# Patient Record
Sex: MALE | Race: WHITE | Employment: UNEMPLOYED | URBAN - METROPOLITAN AREA
[De-identification: names, ages, dates, MRNs, and addresses within clinical notes are randomized per-mention and may not be internally consistent; named-entity substitution may affect disease eponyms.]

---

## 2019-09-25 ENCOUNTER — OFFICE VISIT (OUTPATIENT)
Dept: FAMILY MEDICINE CLINIC | Age: 39
End: 2019-09-25
Payer: COMMERCIAL

## 2019-09-25 VITALS
HEIGHT: 70 IN | SYSTOLIC BLOOD PRESSURE: 124 MMHG | DIASTOLIC BLOOD PRESSURE: 79 MMHG | BODY MASS INDEX: 22.9 KG/M2 | OXYGEN SATURATION: 98 % | WEIGHT: 160 LBS | HEART RATE: 69 BPM

## 2019-09-25 DIAGNOSIS — R10.12 LEFT UPPER QUADRANT PAIN: ICD-10-CM

## 2019-09-25 DIAGNOSIS — R10.9 ACUTE LEFT FLANK PAIN: Primary | ICD-10-CM

## 2019-09-25 DIAGNOSIS — K21.9 GASTROESOPHAGEAL REFLUX DISEASE WITHOUT ESOPHAGITIS: ICD-10-CM

## 2019-09-25 LAB
BILIRUBIN, POC: NORMAL
BLOOD URINE, POC: NORMAL
CLARITY, POC: NORMAL
COLOR, POC: YELLOW
GLUCOSE URINE, POC: NORMAL
KETONES, POC: NORMAL
LEUKOCYTE EST, POC: NORMAL
NITRITE, POC: NORMAL
PH, POC: 7.5
PROTEIN, POC: NORMAL
SPECIFIC GRAVITY, POC: 1.02
UROBILINOGEN, POC: 0.2

## 2019-09-25 PROCEDURE — G8420 CALC BMI NORM PARAMETERS: HCPCS | Performed by: FAMILY MEDICINE

## 2019-09-25 PROCEDURE — 81002 URINALYSIS NONAUTO W/O SCOPE: CPT | Performed by: FAMILY MEDICINE

## 2019-09-25 PROCEDURE — 99213 OFFICE O/P EST LOW 20 MIN: CPT | Performed by: FAMILY MEDICINE

## 2019-09-25 PROCEDURE — G8427 DOCREV CUR MEDS BY ELIG CLIN: HCPCS | Performed by: FAMILY MEDICINE

## 2019-09-25 PROCEDURE — 4004F PT TOBACCO SCREEN RCVD TLK: CPT | Performed by: FAMILY MEDICINE

## 2019-09-25 RX ORDER — PANTOPRAZOLE SODIUM 40 MG/1
40 TABLET, DELAYED RELEASE ORAL
Qty: 30 TABLET | Refills: 3 | Status: SHIPPED | OUTPATIENT
Start: 2019-09-25

## 2019-09-25 ASSESSMENT — PATIENT HEALTH QUESTIONNAIRE - PHQ9
SUM OF ALL RESPONSES TO PHQ QUESTIONS 1-9: 2
SUM OF ALL RESPONSES TO PHQ9 QUESTIONS 1 & 2: 2
SUM OF ALL RESPONSES TO PHQ QUESTIONS 1-9: 2
1. LITTLE INTEREST OR PLEASURE IN DOING THINGS: 1
2. FEELING DOWN, DEPRESSED OR HOPELESS: 1

## 2019-09-25 NOTE — PROGRESS NOTES
gets a big bulge sticks out in his left upper quadrant that is quite painful he is having some upset stomach in general and and is been quite miserable he denies any urinary or bowel complaints  Does have a history 4 years ago of pancreatitis. He is not a drinker  Review of Systems:     Constitutional: Negative for fever, appetite change and fatigue. Family social and medical history reviewed and unchanged     HENT: Negative. Negative for nosebleeds, trouble swallowing and neck pain. Eyes: Negative for photophobia and visual disturbance. Respiratory: Negative. Negative for chest tightness and shortness of breath. Cardiovascular: Negative. Negative for chest pain and leg swelling. Gastrointestinal: Negative. Negative for abdominal pain and blood in stool. Endocrine: Negative for cold intolerance and polyuria. Genitourinary: Negative for dysuria and hematuria. Musculoskeletal: Negative. Skin: Negative for rash. Allergic/Immunologic: Negative. Neurological: Negative. Negative for dizziness, weakness and numbness. Hematological: Negative. Negative for adenopathy. Does not bruise/bleed easily. Psychiatric/Behavioral: Negative for sleep disturbance, dysphoric mood and  decreased concentration. The patient is not nervous/anxious. Objective:     Physical Exam:     Nursing note and vitals reviewed. /79   Pulse 69   Ht 5' 10\" (1.778 m)   Wt 160 lb (72.6 kg)   SpO2 98%   BMI 22.96 kg/m²   Constitutional: He is oriented to person, place, and time. He   appears well-developed and well-nourished. HENT:   Head: Normocephalic and atraumatic. Right Ear: External ear normal. Tympanic membrane is not erythematous. No middle ear effusion. Left Ear: External ear normal. Tympanic membrane is not erythematous. No middle ear effusion. Nose: No mucosal edema. Mouth/Throat: Oropharynx is clear and moist. No posterior oropharyngeal erythema.     Eyes: Conjunctivae and EOM Standing Status:   Future     Standing Expiration Date:   3/25/2020    TSH without Reflex     Standing Status:   Future     Standing Expiration Date:   3/25/2020    Amylase     Standing Status:   Future     Standing Expiration Date:   9/25/2020    Lipase     Standing Status:   Future     Standing Expiration Date:   9/25/2020     Orders Placed This Encounter   Medications    pantoprazole (PROTONIX) 40 MG tablet     Sig: Take 1 tablet by mouth daily (with breakfast)     Dispense:  30 tablet     Refill:  3     To get the above testing done avoid taking any anti-inflammatories and will follow-up pending results  Electronically signed by Meloine Hamilton DO on 9/25/2019 at 2:11 PM

## 2019-09-26 ENCOUNTER — TELEPHONE (OUTPATIENT)
Dept: GASTROENTEROLOGY | Age: 39
End: 2019-09-26

## 2019-09-26 ENCOUNTER — TELEPHONE (OUTPATIENT)
Dept: FAMILY MEDICINE CLINIC | Age: 39
End: 2019-09-26

## 2019-09-26 ENCOUNTER — HOSPITAL ENCOUNTER (EMERGENCY)
Age: 39
Discharge: HOME | End: 2019-09-26
Attending: EMERGENCY MEDICINE
Payer: COMMERCIAL

## 2019-09-26 VITALS
RESPIRATION RATE: 16 BRPM | DIASTOLIC BLOOD PRESSURE: 76 MMHG | BODY MASS INDEX: 22.9 KG/M2 | HEART RATE: 75 BPM | HEIGHT: 70 IN | WEIGHT: 160 LBS | OXYGEN SATURATION: 100 % | TEMPERATURE: 97.7 F | SYSTOLIC BLOOD PRESSURE: 117 MMHG

## 2019-09-26 DIAGNOSIS — Z83.79 FAMILY HISTORY OF CROHN'S DISEASE: ICD-10-CM

## 2019-09-26 DIAGNOSIS — R10.12 LEFT UPPER QUADRANT PAIN: Primary | ICD-10-CM

## 2019-09-26 DIAGNOSIS — K50.119 CROHN'S DISEASE OF COLON WITH COMPLICATION (HCC): Primary | ICD-10-CM

## 2019-09-26 DIAGNOSIS — R10.9 ACUTE LEFT FLANK PAIN: ICD-10-CM

## 2019-09-26 DIAGNOSIS — R19.7 DIARRHEA, UNSPECIFIED TYPE: ICD-10-CM

## 2019-09-26 LAB
ABSOLUTE EOS #: 0.17 K/UL (ref 0–0.44)
ABSOLUTE IMMATURE GRANULOCYTE: <0.03 K/UL (ref 0–0.3)
ABSOLUTE LYMPH #: 1.84 K/UL (ref 1.1–3.7)
ABSOLUTE MONO #: 0.61 K/UL (ref 0.1–1.2)
ALBUMIN SERPL-MCNC: 4.1 G/DL (ref 3.5–5.2)
ALBUMIN/GLOBULIN RATIO: 1.2 (ref 1–2.5)
ALP BLD-CCNC: 53 U/L (ref 40–129)
ALT SERPL-CCNC: 19 U/L (ref 5–41)
ANION GAP SERPL CALCULATED.3IONS-SCNC: 9 MMOL/L (ref 9–17)
AST SERPL-CCNC: 43 U/L
BASOPHILS # BLD: 0 % (ref 0–2)
BASOPHILS ABSOLUTE: 0.04 K/UL (ref 0–0.2)
BILIRUB SERPL-MCNC: 0.68 MG/DL (ref 0.3–1.2)
BILIRUBIN URINE: NEGATIVE
BUN BLDV-MCNC: 10 MG/DL (ref 6–20)
BUN/CREAT BLD: ABNORMAL (ref 9–20)
CALCIUM SERPL-MCNC: 9.1 MG/DL (ref 8.6–10.4)
CHLORIDE BLD-SCNC: 103 MMOL/L (ref 98–107)
CO2: 24 MMOL/L (ref 20–31)
COLOR: YELLOW
COMMENT UA: ABNORMAL
CREAT SERPL-MCNC: 0.69 MG/DL (ref 0.7–1.2)
DIFFERENTIAL TYPE: ABNORMAL
EOSINOPHILS RELATIVE PERCENT: 2 % (ref 1–4)
GFR AFRICAN AMERICAN: >60 ML/MIN
GFR NON-AFRICAN AMERICAN: >60 ML/MIN
GFR SERPL CREATININE-BSD FRML MDRD: ABNORMAL ML/MIN/{1.73_M2}
GFR SERPL CREATININE-BSD FRML MDRD: ABNORMAL ML/MIN/{1.73_M2}
GLUCOSE BLD-MCNC: 105 MG/DL (ref 70–99)
GLUCOSE URINE: NEGATIVE
HCT VFR BLD CALC: 48.9 % (ref 40.7–50.3)
HEMOGLOBIN: 16.7 G/DL (ref 13–17)
IMMATURE GRANULOCYTES: 0 %
KETONES, URINE: ABNORMAL
LEUKOCYTE ESTERASE, URINE: NEGATIVE
LIPASE: 27 U/L (ref 13–60)
LYMPHOCYTES # BLD: 18 % (ref 24–43)
MCH RBC QN AUTO: 31.9 PG (ref 25.2–33.5)
MCHC RBC AUTO-ENTMCNC: 34.2 G/DL (ref 28.4–34.8)
MCV RBC AUTO: 93.5 FL (ref 82.6–102.9)
MONOCYTES # BLD: 6 % (ref 3–12)
NITRITE, URINE: NEGATIVE
NRBC AUTOMATED: 0 PER 100 WBC
PDW BLD-RTO: 13.2 % (ref 11.8–14.4)
PH UA: 8 (ref 5–8)
PLATELET # BLD: 269 K/UL (ref 138–453)
PLATELET ESTIMATE: ABNORMAL
PMV BLD AUTO: 10.2 FL (ref 8.1–13.5)
POTASSIUM SERPL-SCNC: 4.2 MMOL/L (ref 3.7–5.3)
POTASSIUM SERPL-SCNC: 5.5 MMOL/L (ref 3.7–5.3)
PROTEIN UA: NEGATIVE
RBC # BLD: 5.23 M/UL (ref 4.21–5.77)
RBC # BLD: ABNORMAL 10*6/UL
SEG NEUTROPHILS: 74 % (ref 36–65)
SEGMENTED NEUTROPHILS ABSOLUTE COUNT: 7.52 K/UL (ref 1.5–8.1)
SODIUM BLD-SCNC: 136 MMOL/L (ref 135–144)
SPECIFIC GRAVITY UA: 1.02 (ref 1–1.03)
TOTAL PROTEIN: 7.4 G/DL (ref 6.4–8.3)
TURBIDITY: CLEAR
URINE HGB: NEGATIVE
UROBILINOGEN, URINE: NORMAL
WBC # BLD: 10.2 K/UL (ref 3.5–11.3)
WBC # BLD: ABNORMAL 10*3/UL

## 2019-09-26 PROCEDURE — 84132 ASSAY OF SERUM POTASSIUM: CPT

## 2019-09-26 PROCEDURE — 96374 THER/PROPH/DIAG INJ IV PUSH: CPT

## 2019-09-26 PROCEDURE — 99284 EMERGENCY DEPT VISIT MOD MDM: CPT

## 2019-09-26 PROCEDURE — 81003 URINALYSIS AUTO W/O SCOPE: CPT

## 2019-09-26 PROCEDURE — 80053 COMPREHEN METABOLIC PANEL: CPT

## 2019-09-26 PROCEDURE — 96361 HYDRATE IV INFUSION ADD-ON: CPT

## 2019-09-26 PROCEDURE — 6360000002 HC RX W HCPCS: Performed by: STUDENT IN AN ORGANIZED HEALTH CARE EDUCATION/TRAINING PROGRAM

## 2019-09-26 PROCEDURE — 83690 ASSAY OF LIPASE: CPT

## 2019-09-26 PROCEDURE — 96375 TX/PRO/DX INJ NEW DRUG ADDON: CPT

## 2019-09-26 PROCEDURE — 2580000003 HC RX 258: Performed by: STUDENT IN AN ORGANIZED HEALTH CARE EDUCATION/TRAINING PROGRAM

## 2019-09-26 PROCEDURE — 6370000000 HC RX 637 (ALT 250 FOR IP): Performed by: STUDENT IN AN ORGANIZED HEALTH CARE EDUCATION/TRAINING PROGRAM

## 2019-09-26 PROCEDURE — 85025 COMPLETE CBC W/AUTO DIFF WBC: CPT

## 2019-09-26 RX ORDER — HEPARIN SODIUM (PORCINE) LOCK FLUSH IV SOLN 100 UNIT/ML 100 UNIT/ML
300 SOLUTION INTRAVENOUS ONCE
Status: DISCONTINUED | OUTPATIENT
Start: 2019-09-26 | End: 2019-09-26

## 2019-09-26 RX ORDER — 0.9 % SODIUM CHLORIDE 0.9 %
1000 INTRAVENOUS SOLUTION INTRAVENOUS ONCE
Status: COMPLETED | OUTPATIENT
Start: 2019-09-26 | End: 2019-09-26

## 2019-09-26 RX ORDER — ONDANSETRON 4 MG/1
4 TABLET, ORALLY DISINTEGRATING ORAL ONCE
Status: COMPLETED | OUTPATIENT
Start: 2019-09-26 | End: 2019-09-26

## 2019-09-26 RX ORDER — ONDANSETRON 2 MG/ML
4 INJECTION INTRAMUSCULAR; INTRAVENOUS ONCE
Status: COMPLETED | OUTPATIENT
Start: 2019-09-26 | End: 2019-09-26

## 2019-09-26 RX ORDER — OXYCODONE HYDROCHLORIDE AND ACETAMINOPHEN 5; 325 MG/1; MG/1
1 TABLET ORAL EVERY 6 HOURS PRN
Qty: 8 TABLET | Refills: 0 | Status: SHIPPED | OUTPATIENT
Start: 2019-09-26 | End: 2019-09-29

## 2019-09-26 RX ORDER — OXYCODONE HYDROCHLORIDE 5 MG/1
5 TABLET ORAL ONCE
Status: COMPLETED | OUTPATIENT
Start: 2019-09-26 | End: 2019-09-26

## 2019-09-26 RX ORDER — KETOROLAC TROMETHAMINE 30 MG/ML
30 INJECTION, SOLUTION INTRAMUSCULAR; INTRAVENOUS ONCE
Status: COMPLETED | OUTPATIENT
Start: 2019-09-26 | End: 2019-09-26

## 2019-09-26 RX ADMIN — SODIUM CHLORIDE 1000 ML: 9 INJECTION, SOLUTION INTRAVENOUS at 09:07

## 2019-09-26 RX ADMIN — ONDANSETRON 4 MG: 2 INJECTION INTRAMUSCULAR; INTRAVENOUS at 09:08

## 2019-09-26 RX ADMIN — OXYCODONE HYDROCHLORIDE 5 MG: 5 TABLET ORAL at 10:14

## 2019-09-26 RX ADMIN — ONDANSETRON 4 MG: 4 TABLET, ORALLY DISINTEGRATING ORAL at 10:14

## 2019-09-26 RX ADMIN — KETOROLAC TROMETHAMINE 30 MG: 30 INJECTION, SOLUTION INTRAMUSCULAR; INTRAVENOUS at 09:07

## 2019-09-26 ASSESSMENT — PAIN DESCRIPTION - ORIENTATION
ORIENTATION: LEFT;UPPER
ORIENTATION: LEFT

## 2019-09-26 ASSESSMENT — ENCOUNTER SYMPTOMS
BACK PAIN: 0
NAUSEA: 1
ABDOMINAL PAIN: 1
CONSTIPATION: 0
DIARRHEA: 1
WHEEZING: 0
VOMITING: 0
SHORTNESS OF BREATH: 0
COUGH: 0

## 2019-09-26 ASSESSMENT — PAIN SCALES - GENERAL
PAINLEVEL_OUTOF10: 4
PAINLEVEL_OUTOF10: 4
PAINLEVEL_OUTOF10: 7
PAINLEVEL_OUTOF10: 4
PAINLEVEL_OUTOF10: 7

## 2019-09-26 ASSESSMENT — PAIN DESCRIPTION - LOCATION
LOCATION: ABDOMEN
LOCATION: ABDOMEN

## 2019-09-26 ASSESSMENT — PAIN DESCRIPTION - FREQUENCY
FREQUENCY: INTERMITTENT
FREQUENCY: CONTINUOUS

## 2019-09-26 ASSESSMENT — PAIN DESCRIPTION - PROGRESSION
CLINICAL_PROGRESSION: GRADUALLY IMPROVING
CLINICAL_PROGRESSION: GRADUALLY WORSENING

## 2019-09-26 ASSESSMENT — PAIN DESCRIPTION - PAIN TYPE
TYPE: ACUTE PAIN
TYPE: ACUTE PAIN

## 2019-09-26 NOTE — ED NOTES
Pt. Resting on stretcher, eyes open, RR even and non-labored  Pt.  Updated on POC  Will continue to monitor       Derek Seth RN  09/26/19 [id number]

## 2019-09-26 NOTE — ED NOTES
Pt ambulatory to restroom with steady gait, provided with labeled urine cup for specimen collection     Sandhya Polanco RN  09/26/19 7179

## 2019-09-26 NOTE — ED PROVIDER NOTES
PROCEDURES:  None    CONSULTS:  IP CONSULT TO GI    CRITICAL CARE:  Please see attending note    FINAL IMPRESSION      1. Left upper quadrant pain          DISPOSITION / PLAN     DISPOSITION Decision To Discharge 09/26/2019 11:04:42 AM      PATIENT REFERRED TO:  [facility] ED  [address] [place] [address]  [phone]  Go to   If symptoms worsen    Abril Pabon DO  [address] [facility] [address] [facility] [address]  [address] [place] [address] [id number]  Call in 1 week      [address]  [phone]  Call in 3 days  Call PCP for referral, [phone] for GI office at [facility]:  Discharge Medication List as of 9/26/2019 11:35 AM      START taking these medications    Details   oxyCODONE-acetaminophen (PERCOCET) 5-325 MG per tablet Take 1 tablet by mouth every 6 hours as needed for Pain for up to 3 days. Intended supply: 3 days.  Take lowest dose possible to manage pain, Disp-8 tablet, R-0Print             Armando Kay DO  Emergency Medicine Resident    (Please note that portions of this note were completed with a voice recognition program.Efforts were made to edit the dictations but occasionally words are mis-transcribed.)        Armando Kay DO  Resident  09/26/19 8594

## 2019-10-02 ENCOUNTER — HOSPITAL ENCOUNTER (EMERGENCY)
Age: 39
Discharge: HOME | End: 2019-10-02
Attending: EMERGENCY MEDICINE
Payer: COMMERCIAL

## 2019-10-02 VITALS
OXYGEN SATURATION: 96 % | TEMPERATURE: 98.6 F | BODY MASS INDEX: 22.96 KG/M2 | WEIGHT: 160 LBS | HEART RATE: 75 BPM | RESPIRATION RATE: 15 BRPM | DIASTOLIC BLOOD PRESSURE: 78 MMHG | SYSTOLIC BLOOD PRESSURE: 120 MMHG

## 2019-10-02 DIAGNOSIS — R10.30 LOWER ABDOMINAL PAIN: Primary | ICD-10-CM

## 2019-10-02 LAB
ABSOLUTE EOS #: 0.1 K/UL (ref 0–0.44)
ABSOLUTE IMMATURE GRANULOCYTE: 0.04 K/UL (ref 0–0.3)
ABSOLUTE LYMPH #: 2.11 K/UL (ref 1.1–3.7)
ABSOLUTE MONO #: 0.51 K/UL (ref 0.1–1.2)
ALBUMIN SERPL-MCNC: 4.5 G/DL (ref 3.5–5.2)
ALBUMIN/GLOBULIN RATIO: 1.5 (ref 1–2.5)
ALP BLD-CCNC: 60 U/L (ref 40–129)
ALT SERPL-CCNC: 13 U/L (ref 5–41)
ANION GAP SERPL CALCULATED.3IONS-SCNC: 12 MMOL/L (ref 9–17)
AST SERPL-CCNC: 16 U/L
BASOPHILS # BLD: 0 % (ref 0–2)
BASOPHILS ABSOLUTE: 0.03 K/UL (ref 0–0.2)
BILIRUB SERPL-MCNC: 0.27 MG/DL (ref 0.3–1.2)
BILIRUBIN URINE: NEGATIVE
BUN BLDV-MCNC: 16 MG/DL (ref 6–20)
BUN/CREAT BLD: ABNORMAL (ref 9–20)
CALCIUM SERPL-MCNC: 9.4 MG/DL (ref 8.6–10.4)
CHLORIDE BLD-SCNC: 105 MMOL/L (ref 98–107)
CO2: 24 MMOL/L (ref 20–31)
COLOR: YELLOW
COMMENT UA: NORMAL
CREAT SERPL-MCNC: 0.69 MG/DL (ref 0.7–1.2)
DIFFERENTIAL TYPE: ABNORMAL
EOSINOPHILS RELATIVE PERCENT: 1 % (ref 1–4)
GFR AFRICAN AMERICAN: >60 ML/MIN
GFR NON-AFRICAN AMERICAN: >60 ML/MIN
GFR SERPL CREATININE-BSD FRML MDRD: ABNORMAL ML/MIN/{1.73_M2}
GFR SERPL CREATININE-BSD FRML MDRD: ABNORMAL ML/MIN/{1.73_M2}
GLUCOSE BLD-MCNC: 106 MG/DL (ref 70–99)
GLUCOSE URINE: NEGATIVE
HCT VFR BLD CALC: 49.7 % (ref 40.7–50.3)
HEMOGLOBIN: 16.8 G/DL (ref 13–17)
IMMATURE GRANULOCYTES: 1 %
KETONES, URINE: NEGATIVE
LEUKOCYTE ESTERASE, URINE: NEGATIVE
LIPASE: 43 U/L (ref 13–60)
LYMPHOCYTES # BLD: 30 % (ref 24–43)
MCH RBC QN AUTO: 31.5 PG (ref 25.2–33.5)
MCHC RBC AUTO-ENTMCNC: 33.8 G/DL (ref 28.4–34.8)
MCV RBC AUTO: 93.2 FL (ref 82.6–102.9)
MONOCYTES # BLD: 7 % (ref 3–12)
NITRITE, URINE: NEGATIVE
NRBC AUTOMATED: 0 PER 100 WBC
PDW BLD-RTO: 13 % (ref 11.8–14.4)
PH UA: 6 (ref 5–8)
PLATELET # BLD: 264 K/UL (ref 138–453)
PLATELET ESTIMATE: ABNORMAL
PMV BLD AUTO: 9.9 FL (ref 8.1–13.5)
POTASSIUM SERPL-SCNC: 4.5 MMOL/L (ref 3.7–5.3)
PROTEIN UA: NEGATIVE
RBC # BLD: 5.33 M/UL (ref 4.21–5.77)
RBC # BLD: ABNORMAL 10*6/UL
SEG NEUTROPHILS: 61 % (ref 36–65)
SEGMENTED NEUTROPHILS ABSOLUTE COUNT: 4.28 K/UL (ref 1.5–8.1)
SODIUM BLD-SCNC: 141 MMOL/L (ref 135–144)
SPECIFIC GRAVITY UA: 1.03 (ref 1–1.03)
TOTAL PROTEIN: 7.5 G/DL (ref 6.4–8.3)
TURBIDITY: CLEAR
URINE HGB: NEGATIVE
UROBILINOGEN, URINE: NORMAL
WBC # BLD: 7.1 K/UL (ref 3.5–11.3)
WBC # BLD: ABNORMAL 10*3/UL

## 2019-10-02 PROCEDURE — 80053 COMPREHEN METABOLIC PANEL: CPT

## 2019-10-02 PROCEDURE — 81003 URINALYSIS AUTO W/O SCOPE: CPT

## 2019-10-02 PROCEDURE — 85025 COMPLETE CBC W/AUTO DIFF WBC: CPT

## 2019-10-02 PROCEDURE — 83690 ASSAY OF LIPASE: CPT

## 2019-10-02 PROCEDURE — 6360000002 HC RX W HCPCS: Performed by: STUDENT IN AN ORGANIZED HEALTH CARE EDUCATION/TRAINING PROGRAM

## 2019-10-02 PROCEDURE — 99284 EMERGENCY DEPT VISIT MOD MDM: CPT

## 2019-10-02 PROCEDURE — 96374 THER/PROPH/DIAG INJ IV PUSH: CPT

## 2019-10-02 RX ORDER — MORPHINE SULFATE 4 MG/ML
4 INJECTION, SOLUTION INTRAMUSCULAR; INTRAVENOUS ONCE
Status: COMPLETED | OUTPATIENT
Start: 2019-10-02 | End: 2019-10-02

## 2019-10-02 RX ORDER — ONDANSETRON 2 MG/ML
4 INJECTION INTRAMUSCULAR; INTRAVENOUS ONCE
Status: COMPLETED | OUTPATIENT
Start: 2019-10-02 | End: 2019-10-02

## 2019-10-02 RX ADMIN — MORPHINE SULFATE 4 MG: 4 INJECTION INTRAVENOUS at 09:31

## 2019-10-02 RX ADMIN — ONDANSETRON 4 MG: 2 INJECTION INTRAMUSCULAR; INTRAVENOUS at 09:31

## 2019-10-02 ASSESSMENT — ENCOUNTER SYMPTOMS
ABDOMINAL PAIN: 1
NAUSEA: 0
CONSTIPATION: 0
SHORTNESS OF BREATH: 0
COUGH: 0
VOMITING: 0
BACK PAIN: 0
DIARRHEA: 0

## 2019-10-02 ASSESSMENT — PAIN DESCRIPTION - ORIENTATION: ORIENTATION: LEFT;UPPER

## 2019-10-02 ASSESSMENT — PAIN DESCRIPTION - LOCATION: LOCATION: ABDOMEN

## 2019-10-02 ASSESSMENT — PAIN SCALES - GENERAL
PAINLEVEL_OUTOF10: 7
PAINLEVEL_OUTOF10: 7

## 2019-10-13 ENCOUNTER — HOSPITAL ENCOUNTER (EMERGENCY)
Age: 39
Discharge: HOME | End: 2019-10-13
Attending: EMERGENCY MEDICINE
Payer: COMMERCIAL

## 2019-10-13 ENCOUNTER — APPOINTMENT (OUTPATIENT)
Dept: CT IMAGING | Age: 39
End: 2019-10-13
Payer: COMMERCIAL

## 2019-10-13 ENCOUNTER — APPOINTMENT (OUTPATIENT)
Dept: GENERAL RADIOLOGY | Age: 39
End: 2019-10-13
Payer: COMMERCIAL

## 2019-10-13 VITALS
TEMPERATURE: 96.6 F | OXYGEN SATURATION: 100 % | DIASTOLIC BLOOD PRESSURE: 76 MMHG | SYSTOLIC BLOOD PRESSURE: 111 MMHG | RESPIRATION RATE: 16 BRPM | HEART RATE: 78 BPM

## 2019-10-13 DIAGNOSIS — R10.84 GENERALIZED ABDOMINAL PAIN: ICD-10-CM

## 2019-10-13 DIAGNOSIS — R11.2 NON-INTRACTABLE VOMITING WITH NAUSEA, UNSPECIFIED VOMITING TYPE: Primary | ICD-10-CM

## 2019-10-13 DIAGNOSIS — R19.8 ALTERNATING CONSTIPATION AND DIARRHEA: ICD-10-CM

## 2019-10-13 LAB
ABSOLUTE EOS #: 0.07 K/UL (ref 0–0.44)
ABSOLUTE IMMATURE GRANULOCYTE: <0.03 K/UL (ref 0–0.3)
ABSOLUTE LYMPH #: 1.82 K/UL (ref 1.1–3.7)
ABSOLUTE MONO #: 0.46 K/UL (ref 0.1–1.2)
ALBUMIN SERPL-MCNC: 4.6 G/DL (ref 3.5–5.2)
ALBUMIN/GLOBULIN RATIO: 1.4 (ref 1–2.5)
ALP BLD-CCNC: 59 U/L (ref 40–129)
ALT SERPL-CCNC: 11 U/L (ref 5–41)
ANION GAP SERPL CALCULATED.3IONS-SCNC: 11 MMOL/L (ref 9–17)
AST SERPL-CCNC: 15 U/L
BASOPHILS # BLD: 0 % (ref 0–2)
BASOPHILS ABSOLUTE: 0.03 K/UL (ref 0–0.2)
BILIRUB SERPL-MCNC: 0.7 MG/DL (ref 0.3–1.2)
BUN BLDV-MCNC: 17 MG/DL (ref 6–20)
BUN/CREAT BLD: ABNORMAL (ref 9–20)
C-REACTIVE PROTEIN: 0.5 MG/L (ref 0–5)
CALCIUM SERPL-MCNC: 9.6 MG/DL (ref 8.6–10.4)
CHLORIDE BLD-SCNC: 103 MMOL/L (ref 98–107)
CO2: 26 MMOL/L (ref 20–31)
CREAT SERPL-MCNC: 0.75 MG/DL (ref 0.7–1.2)
DIFFERENTIAL TYPE: ABNORMAL
EOSINOPHILS RELATIVE PERCENT: 1 % (ref 1–4)
GFR AFRICAN AMERICAN: >60 ML/MIN
GFR NON-AFRICAN AMERICAN: >60 ML/MIN
GFR SERPL CREATININE-BSD FRML MDRD: ABNORMAL ML/MIN/{1.73_M2}
GFR SERPL CREATININE-BSD FRML MDRD: ABNORMAL ML/MIN/{1.73_M2}
GLUCOSE BLD-MCNC: 114 MG/DL (ref 70–99)
HCT VFR BLD CALC: 51.2 % (ref 40.7–50.3)
HEMOGLOBIN: 17.5 G/DL (ref 13–17)
IMMATURE GRANULOCYTES: 0 %
LIPASE: 38 U/L (ref 13–60)
LYMPHOCYTES # BLD: 22 % (ref 24–43)
MCH RBC QN AUTO: 31.8 PG (ref 25.2–33.5)
MCHC RBC AUTO-ENTMCNC: 34.2 G/DL (ref 28.4–34.8)
MCV RBC AUTO: 93.1 FL (ref 82.6–102.9)
MONOCYTES # BLD: 6 % (ref 3–12)
NRBC AUTOMATED: 0 PER 100 WBC
PDW BLD-RTO: 12.6 % (ref 11.8–14.4)
PLATELET # BLD: 258 K/UL (ref 138–453)
PLATELET ESTIMATE: ABNORMAL
PMV BLD AUTO: 10.1 FL (ref 8.1–13.5)
POTASSIUM SERPL-SCNC: 4.3 MMOL/L (ref 3.7–5.3)
RBC # BLD: 5.5 M/UL (ref 4.21–5.77)
RBC # BLD: ABNORMAL 10*6/UL
SEDIMENTATION RATE, ERYTHROCYTE: 1 MM (ref 0–10)
SEG NEUTROPHILS: 71 % (ref 36–65)
SEGMENTED NEUTROPHILS ABSOLUTE COUNT: 5.76 K/UL (ref 1.5–8.1)
SODIUM BLD-SCNC: 140 MMOL/L (ref 135–144)
TOTAL PROTEIN: 7.8 G/DL (ref 6.4–8.3)
TROPONIN INTERP: NORMAL
TROPONIN T: NORMAL NG/ML
TROPONIN, HIGH SENSITIVITY: <6 NG/L (ref 0–22)
WBC # BLD: 8.2 K/UL (ref 3.5–11.3)
WBC # BLD: ABNORMAL 10*3/UL

## 2019-10-13 PROCEDURE — 96375 TX/PRO/DX INJ NEW DRUG ADDON: CPT

## 2019-10-13 PROCEDURE — 96376 TX/PRO/DX INJ SAME DRUG ADON: CPT

## 2019-10-13 PROCEDURE — 99284 EMERGENCY DEPT VISIT MOD MDM: CPT

## 2019-10-13 PROCEDURE — 2500000003 HC RX 250 WO HCPCS: Performed by: EMERGENCY MEDICINE

## 2019-10-13 PROCEDURE — 83690 ASSAY OF LIPASE: CPT

## 2019-10-13 PROCEDURE — 85025 COMPLETE CBC W/AUTO DIFF WBC: CPT

## 2019-10-13 PROCEDURE — 6360000002 HC RX W HCPCS: Performed by: EMERGENCY MEDICINE

## 2019-10-13 PROCEDURE — 84484 ASSAY OF TROPONIN QUANT: CPT

## 2019-10-13 PROCEDURE — 86140 C-REACTIVE PROTEIN: CPT

## 2019-10-13 PROCEDURE — 93005 ELECTROCARDIOGRAM TRACING: CPT | Performed by: EMERGENCY MEDICINE

## 2019-10-13 PROCEDURE — 71046 X-RAY EXAM CHEST 2 VIEWS: CPT

## 2019-10-13 PROCEDURE — 74177 CT ABD & PELVIS W/CONTRAST: CPT

## 2019-10-13 PROCEDURE — 80053 COMPREHEN METABOLIC PANEL: CPT

## 2019-10-13 PROCEDURE — 2580000003 HC RX 258: Performed by: EMERGENCY MEDICINE

## 2019-10-13 PROCEDURE — 96374 THER/PROPH/DIAG INJ IV PUSH: CPT

## 2019-10-13 PROCEDURE — 85651 RBC SED RATE NONAUTOMATED: CPT

## 2019-10-13 PROCEDURE — 6360000004 HC RX CONTRAST MEDICATION: Performed by: EMERGENCY MEDICINE

## 2019-10-13 RX ORDER — ONDANSETRON 4 MG/1
4 TABLET, FILM COATED ORAL EVERY 8 HOURS PRN
Qty: 20 TABLET | Refills: 0 | Status: SHIPPED | OUTPATIENT
Start: 2019-10-13

## 2019-10-13 RX ORDER — DICYCLOMINE HYDROCHLORIDE 10 MG/1
10 CAPSULE ORAL EVERY 6 HOURS PRN
Qty: 20 CAPSULE | Refills: 0 | Status: SHIPPED | OUTPATIENT
Start: 2019-10-13

## 2019-10-13 RX ORDER — MORPHINE SULFATE 4 MG/ML
4 INJECTION, SOLUTION INTRAMUSCULAR; INTRAVENOUS ONCE
Status: COMPLETED | OUTPATIENT
Start: 2019-10-13 | End: 2019-10-13

## 2019-10-13 RX ORDER — ACETAMINOPHEN 325 MG/1
650 TABLET ORAL EVERY 6 HOURS PRN
Qty: 21 TABLET | Refills: 0 | Status: SHIPPED | OUTPATIENT
Start: 2019-10-13

## 2019-10-13 RX ORDER — 0.9 % SODIUM CHLORIDE 0.9 %
1000 INTRAVENOUS SOLUTION INTRAVENOUS ONCE
Status: COMPLETED | OUTPATIENT
Start: 2019-10-13 | End: 2019-10-13

## 2019-10-13 RX ORDER — PROMETHAZINE HYDROCHLORIDE 25 MG/ML
12.5 INJECTION, SOLUTION INTRAMUSCULAR; INTRAVENOUS ONCE
Status: COMPLETED | OUTPATIENT
Start: 2019-10-13 | End: 2019-10-13

## 2019-10-13 RX ORDER — ONDANSETRON 2 MG/ML
4 INJECTION INTRAMUSCULAR; INTRAVENOUS ONCE
Status: COMPLETED | OUTPATIENT
Start: 2019-10-13 | End: 2019-10-13

## 2019-10-13 RX ORDER — PROMETHAZINE HYDROCHLORIDE 25 MG/1
25 TABLET ORAL EVERY 6 HOURS PRN
Qty: 20 TABLET | Refills: 0 | Status: SHIPPED | OUTPATIENT
Start: 2019-10-13 | End: 2019-10-20

## 2019-10-13 RX ADMIN — SODIUM CHLORIDE 1000 ML: 9 INJECTION, SOLUTION INTRAVENOUS at 10:31

## 2019-10-13 RX ADMIN — MORPHINE SULFATE 4 MG: 4 INJECTION INTRAVENOUS at 10:31

## 2019-10-13 RX ADMIN — IOHEXOL 75 ML: 350 INJECTION, SOLUTION INTRAVENOUS at 12:37

## 2019-10-13 RX ADMIN — PROMETHAZINE HYDROCHLORIDE 12.5 MG: 25 INJECTION INTRAMUSCULAR; INTRAVENOUS at 12:43

## 2019-10-13 RX ADMIN — ONDANSETRON 4 MG: 2 INJECTION INTRAMUSCULAR; INTRAVENOUS at 10:31

## 2019-10-13 RX ADMIN — MORPHINE SULFATE 4 MG: 4 INJECTION INTRAVENOUS at 12:43

## 2019-10-13 RX ADMIN — FAMOTIDINE 20 MG: 10 INJECTION, SOLUTION INTRAVENOUS at 10:31

## 2019-10-13 SDOH — HEALTH STABILITY: MENTAL HEALTH: HOW OFTEN DO YOU HAVE A DRINK CONTAINING ALCOHOL?: NEVER

## 2019-10-13 ASSESSMENT — ENCOUNTER SYMPTOMS
SHORTNESS OF BREATH: 1
BACK PAIN: 0
ABDOMINAL PAIN: 1
VOMITING: 1
CONSTIPATION: 1
NAUSEA: 1
DIARRHEA: 1

## 2019-10-13 ASSESSMENT — PAIN DESCRIPTION - LOCATION: LOCATION: ABDOMEN

## 2019-10-13 ASSESSMENT — PAIN SCALES - GENERAL
PAINLEVEL_OUTOF10: 8

## 2019-10-13 ASSESSMENT — PAIN DESCRIPTION - PAIN TYPE: TYPE: ACUTE PAIN

## 2019-10-14 ENCOUNTER — HOSPITAL ENCOUNTER (EMERGENCY)
Age: 39
Discharge: HOME | End: 2019-10-14
Attending: EMERGENCY MEDICINE
Payer: COMMERCIAL

## 2019-10-14 VITALS
WEIGHT: 160 LBS | DIASTOLIC BLOOD PRESSURE: 76 MMHG | TEMPERATURE: 98.3 F | HEIGHT: 71 IN | BODY MASS INDEX: 22.4 KG/M2 | OXYGEN SATURATION: 99 % | SYSTOLIC BLOOD PRESSURE: 114 MMHG | RESPIRATION RATE: 16 BRPM | HEART RATE: 67 BPM

## 2019-10-14 DIAGNOSIS — R10.84 GENERALIZED ABDOMINAL PAIN: Primary | ICD-10-CM

## 2019-10-14 LAB
-: ABNORMAL
ABSOLUTE EOS #: 0.1 K/UL (ref 0–0.4)
ABSOLUTE IMMATURE GRANULOCYTE: NORMAL K/UL (ref 0–0.3)
ABSOLUTE LYMPH #: 2.4 K/UL (ref 1–4.8)
ABSOLUTE MONO #: 0.7 K/UL (ref 0.1–1.3)
ALBUMIN SERPL-MCNC: 4.7 G/DL (ref 3.5–5.2)
ALBUMIN/GLOBULIN RATIO: ABNORMAL (ref 1–2.5)
ALP BLD-CCNC: 57 U/L (ref 40–129)
ALT SERPL-CCNC: 10 U/L (ref 5–41)
AMORPHOUS: ABNORMAL
ANION GAP SERPL CALCULATED.3IONS-SCNC: 15 MMOL/L (ref 9–17)
AST SERPL-CCNC: 13 U/L
BACTERIA: ABNORMAL
BASOPHILS # BLD: 1 % (ref 0–2)
BASOPHILS ABSOLUTE: 0.1 K/UL (ref 0–0.2)
BILIRUB SERPL-MCNC: 0.5 MG/DL (ref 0.3–1.2)
BILIRUBIN URINE: NEGATIVE
BUN BLDV-MCNC: 14 MG/DL (ref 6–20)
BUN/CREAT BLD: ABNORMAL (ref 9–20)
CALCIUM SERPL-MCNC: 9.9 MG/DL (ref 8.6–10.4)
CASTS UA: ABNORMAL /LPF
CHLORIDE BLD-SCNC: 99 MMOL/L (ref 98–107)
CO2: 28 MMOL/L (ref 20–31)
COLOR: YELLOW
COMMENT UA: ABNORMAL
CREAT SERPL-MCNC: 0.8 MG/DL (ref 0.7–1.2)
CRYSTALS, UA: ABNORMAL /HPF
DIFFERENTIAL TYPE: NORMAL
EKG ATRIAL RATE: 79 BPM
EKG P AXIS: 71 DEGREES
EKG P-R INTERVAL: 150 MS
EKG Q-T INTERVAL: 370 MS
EKG QRS DURATION: 86 MS
EKG QTC CALCULATION (BAZETT): 424 MS
EKG R AXIS: 79 DEGREES
EKG T AXIS: 39 DEGREES
EKG VENTRICULAR RATE: 79 BPM
EOSINOPHILS RELATIVE PERCENT: 1 % (ref 0–4)
EPITHELIAL CELLS UA: ABNORMAL /HPF
GFR AFRICAN AMERICAN: >60 ML/MIN
GFR NON-AFRICAN AMERICAN: >60 ML/MIN
GFR SERPL CREATININE-BSD FRML MDRD: ABNORMAL ML/MIN/{1.73_M2}
GFR SERPL CREATININE-BSD FRML MDRD: ABNORMAL ML/MIN/{1.73_M2}
GLUCOSE BLD-MCNC: 60 MG/DL (ref 70–99)
GLUCOSE URINE: NEGATIVE
HCT VFR BLD CALC: 49.9 % (ref 41–53)
HEMOGLOBIN: 17 G/DL (ref 13.5–17.5)
IMMATURE GRANULOCYTES: NORMAL %
KETONES, URINE: NEGATIVE
LEUKOCYTE ESTERASE, URINE: NEGATIVE
LIPASE: 55 U/L (ref 13–60)
LYMPHOCYTES # BLD: 27 % (ref 24–44)
MCH RBC QN AUTO: 31.7 PG (ref 26–34)
MCHC RBC AUTO-ENTMCNC: 34.1 G/DL (ref 31–37)
MCV RBC AUTO: 92.8 FL (ref 80–100)
MONOCYTES # BLD: 7 % (ref 1–7)
MUCUS: ABNORMAL
NITRITE, URINE: NEGATIVE
NRBC AUTOMATED: NORMAL PER 100 WBC
OTHER OBSERVATIONS UA: ABNORMAL
PDW BLD-RTO: 14 % (ref 11.5–14.9)
PH UA: 7.5 (ref 5–8)
PLATELET # BLD: 236 K/UL (ref 150–450)
PLATELET ESTIMATE: NORMAL
PMV BLD AUTO: 8.2 FL (ref 6–12)
POTASSIUM SERPL-SCNC: 4.1 MMOL/L (ref 3.7–5.3)
PROTEIN UA: NEGATIVE
RBC # BLD: 5.37 M/UL (ref 4.5–5.9)
RBC # BLD: NORMAL 10*6/UL
RBC UA: ABNORMAL /HPF
RENAL EPITHELIAL, UA: ABNORMAL /HPF
SEG NEUTROPHILS: 64 % (ref 36–66)
SEGMENTED NEUTROPHILS ABSOLUTE COUNT: 5.7 K/UL (ref 1.3–9.1)
SODIUM BLD-SCNC: 142 MMOL/L (ref 135–144)
SPECIFIC GRAVITY UA: 1.02 (ref 1–1.03)
TOTAL PROTEIN: 7.6 G/DL (ref 6.4–8.3)
TRICHOMONAS: ABNORMAL
TURBIDITY: ABNORMAL
URINE HGB: NEGATIVE
UROBILINOGEN, URINE: NORMAL
WBC # BLD: 9 K/UL (ref 3.5–11)
WBC # BLD: NORMAL 10*3/UL
WBC UA: ABNORMAL /HPF
YEAST: ABNORMAL

## 2019-10-14 PROCEDURE — 83690 ASSAY OF LIPASE: CPT

## 2019-10-14 PROCEDURE — 36415 COLL VENOUS BLD VENIPUNCTURE: CPT

## 2019-10-14 PROCEDURE — 85025 COMPLETE CBC W/AUTO DIFF WBC: CPT

## 2019-10-14 PROCEDURE — 81001 URINALYSIS AUTO W/SCOPE: CPT

## 2019-10-14 PROCEDURE — 6360000002 HC RX W HCPCS: Performed by: EMERGENCY MEDICINE

## 2019-10-14 PROCEDURE — 96374 THER/PROPH/DIAG INJ IV PUSH: CPT

## 2019-10-14 PROCEDURE — 96372 THER/PROPH/DIAG INJ SC/IM: CPT

## 2019-10-14 PROCEDURE — 2580000003 HC RX 258: Performed by: EMERGENCY MEDICINE

## 2019-10-14 PROCEDURE — 93010 ELECTROCARDIOGRAM REPORT: CPT | Performed by: INTERNAL MEDICINE

## 2019-10-14 PROCEDURE — 80053 COMPREHEN METABOLIC PANEL: CPT

## 2019-10-14 PROCEDURE — 99284 EMERGENCY DEPT VISIT MOD MDM: CPT

## 2019-10-14 RX ORDER — DICYCLOMINE HYDROCHLORIDE 10 MG/ML
20 INJECTION INTRAMUSCULAR ONCE
Status: COMPLETED | OUTPATIENT
Start: 2019-10-14 | End: 2019-10-14

## 2019-10-14 RX ORDER — 0.9 % SODIUM CHLORIDE 0.9 %
1000 INTRAVENOUS SOLUTION INTRAVENOUS ONCE
Status: COMPLETED | OUTPATIENT
Start: 2019-10-14 | End: 2019-10-14

## 2019-10-14 RX ORDER — KETOROLAC TROMETHAMINE 30 MG/ML
30 INJECTION, SOLUTION INTRAMUSCULAR; INTRAVENOUS ONCE
Status: COMPLETED | OUTPATIENT
Start: 2019-10-14 | End: 2019-10-14

## 2019-10-14 RX ADMIN — KETOROLAC TROMETHAMINE 30 MG: 30 INJECTION, SOLUTION INTRAMUSCULAR; INTRAVENOUS at 12:03

## 2019-10-14 RX ADMIN — SODIUM CHLORIDE 1000 ML: 9 INJECTION, SOLUTION INTRAVENOUS at 12:03

## 2019-10-14 RX ADMIN — DICYCLOMINE HYDROCHLORIDE 20 MG: 20 INJECTION, SOLUTION INTRAMUSCULAR at 11:53

## 2019-10-14 ASSESSMENT — PAIN DESCRIPTION - PAIN TYPE
TYPE: ACUTE PAIN
TYPE: ACUTE PAIN

## 2019-10-14 ASSESSMENT — PAIN DESCRIPTION - FREQUENCY
FREQUENCY: CONTINUOUS
FREQUENCY: INTERMITTENT

## 2019-10-14 ASSESSMENT — PAIN DESCRIPTION - LOCATION
LOCATION: ABDOMEN
LOCATION: ABDOMEN

## 2019-10-14 ASSESSMENT — PAIN DESCRIPTION - DESCRIPTORS
DESCRIPTORS: ACHING;CRAMPING
DESCRIPTORS: PRESSURE

## 2019-10-14 ASSESSMENT — ENCOUNTER SYMPTOMS
COUGH: 0
SHORTNESS OF BREATH: 0
DIARRHEA: 0
NAUSEA: 0
BACK PAIN: 0
VOMITING: 0
EYES NEGATIVE: 1
ABDOMINAL PAIN: 1
RESPIRATORY NEGATIVE: 1

## 2019-10-14 ASSESSMENT — PAIN DESCRIPTION - PROGRESSION: CLINICAL_PROGRESSION: GRADUALLY WORSENING

## 2019-10-14 ASSESSMENT — PAIN SCALES - GENERAL
PAINLEVEL_OUTOF10: 6

## 2019-10-14 ASSESSMENT — PAIN DESCRIPTION - ONSET: ONSET: ON-GOING

## 2019-10-14 ASSESSMENT — PAIN DESCRIPTION - ORIENTATION
ORIENTATION: LEFT
ORIENTATION: LEFT;LOWER

## 2019-10-15 ENCOUNTER — OFFICE VISIT (OUTPATIENT)
Dept: GASTROENTEROLOGY | Age: 39
End: 2019-10-15
Payer: COMMERCIAL

## 2019-10-15 VITALS
SYSTOLIC BLOOD PRESSURE: 105 MMHG | DIASTOLIC BLOOD PRESSURE: 78 MMHG | HEART RATE: 70 BPM | WEIGHT: 157.2 LBS | BODY MASS INDEX: 21.92 KG/M2

## 2019-10-15 DIAGNOSIS — F41.9 ANXIETY: ICD-10-CM

## 2019-10-15 DIAGNOSIS — R10.9 LEFT FLANK PAIN: ICD-10-CM

## 2019-10-15 DIAGNOSIS — Z83.79 FAMILY HISTORY OF CROHN'S DISEASE: ICD-10-CM

## 2019-10-15 DIAGNOSIS — R19.7 DIARRHEA, UNSPECIFIED TYPE: Primary | ICD-10-CM

## 2019-10-15 PROCEDURE — G8427 DOCREV CUR MEDS BY ELIG CLIN: HCPCS | Performed by: INTERNAL MEDICINE

## 2019-10-15 PROCEDURE — 99245 OFF/OP CONSLTJ NEW/EST HI 55: CPT | Performed by: INTERNAL MEDICINE

## 2019-10-15 PROCEDURE — G8484 FLU IMMUNIZE NO ADMIN: HCPCS | Performed by: INTERNAL MEDICINE

## 2019-10-15 PROCEDURE — G8420 CALC BMI NORM PARAMETERS: HCPCS | Performed by: INTERNAL MEDICINE

## 2019-10-15 RX ORDER — ALPRAZOLAM 0.25 MG/1
0.25 TABLET ORAL EVERY 12 HOURS PRN
Qty: 30 TABLET | Refills: 0 | Status: SHIPPED | OUTPATIENT
Start: 2019-10-15 | End: 2019-10-30

## 2019-10-15 RX ORDER — POLYETHYLENE GLYCOL 3350 17 G/17G
POWDER, FOR SOLUTION ORAL
Qty: 238 G | Refills: 0 | Status: ON HOLD | OUTPATIENT
Start: 2019-10-15 | End: 2019-10-25 | Stop reason: ALTCHOICE

## 2019-10-15 ASSESSMENT — ENCOUNTER SYMPTOMS
CONSTIPATION: 1
VOICE CHANGE: 0
ANAL BLEEDING: 0
ABDOMINAL PAIN: 1
NAUSEA: 1
COUGH: 0
BACK PAIN: 1
ABDOMINAL DISTENTION: 0
SINUS PRESSURE: 0
VOMITING: 0
DIARRHEA: 1
TROUBLE SWALLOWING: 0
SHORTNESS OF BREATH: 1
BLOOD IN STOOL: 0
EYES NEGATIVE: 1
CHOKING: 0
WHEEZING: 0
RECTAL PAIN: 0
SORE THROAT: 0

## 2019-10-16 ENCOUNTER — HOSPITAL ENCOUNTER (OUTPATIENT)
Dept: GENERAL RADIOLOGY | Age: 39
Discharge: HOME | End: 2019-10-18
Payer: COMMERCIAL

## 2019-10-16 ENCOUNTER — HOSPITAL ENCOUNTER (OUTPATIENT)
Age: 39
Discharge: HOME | End: 2019-10-16
Payer: COMMERCIAL

## 2019-10-16 DIAGNOSIS — R19.7 DIARRHEA, UNSPECIFIED TYPE: ICD-10-CM

## 2019-10-16 LAB
SEDIMENTATION RATE, ERYTHROCYTE: 2 MM (ref 0–10)
TSH SERPL DL<=0.05 MIU/L-ACNC: 6.32 MIU/L (ref 0.3–5)

## 2019-10-16 PROCEDURE — 83516 IMMUNOASSAY NONANTIBODY: CPT

## 2019-10-16 PROCEDURE — 85651 RBC SED RATE NONAUTOMATED: CPT

## 2019-10-16 PROCEDURE — 74249 FL UGI W SMALL BOWEL W DOUBLE CONTRAST: CPT

## 2019-10-16 PROCEDURE — 2500000003 HC RX 250 WO HCPCS: Performed by: INTERNAL MEDICINE

## 2019-10-16 PROCEDURE — 84439 ASSAY OF FREE THYROXINE: CPT

## 2019-10-16 PROCEDURE — 36415 COLL VENOUS BLD VENIPUNCTURE: CPT

## 2019-10-16 PROCEDURE — 84443 ASSAY THYROID STIM HORMONE: CPT

## 2019-10-16 RX ADMIN — BARIUM SULFATE 160 ML: 960 POWDER, FOR SUSPENSION ORAL at 08:55

## 2019-10-16 RX ADMIN — BARIUM SULFATE 140 ML: 980 POWDER, FOR SUSPENSION ORAL at 08:45

## 2019-10-17 LAB
THYROXINE, FREE: 1 NG/DL (ref 0.93–1.7)
TISSUE TRANSGLUTAMINASE IGA: 0.5 U/ML

## 2019-10-19 ENCOUNTER — HOSPITAL ENCOUNTER (OUTPATIENT)
Age: 39
Discharge: HOME | End: 2019-10-19
Payer: COMMERCIAL

## 2019-10-19 LAB
DATE, STOOL #1: NORMAL
DATE, STOOL #2: NORMAL
DATE, STOOL #3: NORMAL
HEMOCCULT SP1 STL QL: NEGATIVE
HEMOCCULT SP2 STL QL: NEGATIVE
HEMOCCULT SP3 STL QL: NEGATIVE
TIME, STOOL #1: NORMAL
TIME, STOOL #2: NORMAL
TIME, STOOL #3: NORMAL

## 2019-10-19 PROCEDURE — G0328 FECAL BLOOD SCRN IMMUNOASSAY: HCPCS

## 2019-10-19 PROCEDURE — 83520 IMMUNOASSAY QUANT NOS NONAB: CPT

## 2019-10-19 PROCEDURE — 82705 FATS/LIPIDS FECES QUAL: CPT

## 2019-10-19 PROCEDURE — 87329 GIARDIA AG IA: CPT

## 2019-10-21 LAB
DIRECT EXAM: NORMAL
FAT QUALITATIVE SPLIT STOOL: NORMAL
FECAL NEUTRAL FAT: NORMAL
Lab: NORMAL
SPECIMEN DESCRIPTION: NORMAL

## 2019-10-22 ENCOUNTER — TELEPHONE (OUTPATIENT)
Dept: GASTROENTEROLOGY | Age: 39
End: 2019-10-22

## 2019-10-22 LAB — FECAL PANCREATIC ELASTASE-1: 397 UG/G

## 2019-10-25 ENCOUNTER — ANESTHESIA EVENT (OUTPATIENT)
Dept: ENDOSCOPY | Age: 39
End: 2019-10-25
Payer: COMMERCIAL

## 2019-10-25 ENCOUNTER — ANESTHESIA (OUTPATIENT)
Dept: ENDOSCOPY | Age: 39
End: 2019-10-25
Payer: COMMERCIAL

## 2019-10-25 ENCOUNTER — HOSPITAL ENCOUNTER (OUTPATIENT)
Age: 39
Setting detail: OUTPATIENT SURGERY
Discharge: HOME | End: 2019-10-25
Attending: INTERNAL MEDICINE | Admitting: INTERNAL MEDICINE
Payer: COMMERCIAL

## 2019-10-25 VITALS
TEMPERATURE: 98.5 F | WEIGHT: 160 LBS | RESPIRATION RATE: 18 BRPM | HEIGHT: 69 IN | HEART RATE: 69 BPM | BODY MASS INDEX: 23.7 KG/M2 | SYSTOLIC BLOOD PRESSURE: 113 MMHG | OXYGEN SATURATION: 98 % | DIASTOLIC BLOOD PRESSURE: 80 MMHG

## 2019-10-25 VITALS — OXYGEN SATURATION: 97 % | DIASTOLIC BLOOD PRESSURE: 74 MMHG | TEMPERATURE: 98.6 F | SYSTOLIC BLOOD PRESSURE: 117 MMHG

## 2019-10-25 PROCEDURE — 45385 COLONOSCOPY W/LESION REMOVAL: CPT | Performed by: INTERNAL MEDICINE

## 2019-10-25 PROCEDURE — 45380 COLONOSCOPY AND BIOPSY: CPT | Performed by: INTERNAL MEDICINE

## 2019-10-25 PROCEDURE — 6360000002 HC RX W HCPCS: Performed by: NURSE ANESTHETIST, CERTIFIED REGISTERED

## 2019-10-25 PROCEDURE — 7100000000 HC PACU RECOVERY - FIRST 15 MIN: Performed by: INTERNAL MEDICINE

## 2019-10-25 PROCEDURE — 7100000001 HC PACU RECOVERY - ADDTL 15 MIN: Performed by: INTERNAL MEDICINE

## 2019-10-25 PROCEDURE — 2500000003 HC RX 250 WO HCPCS: Performed by: NURSE ANESTHETIST, CERTIFIED REGISTERED

## 2019-10-25 PROCEDURE — 88305 TISSUE EXAM BY PATHOLOGIST: CPT

## 2019-10-25 PROCEDURE — 3700000000 HC ANESTHESIA ATTENDED CARE: Performed by: INTERNAL MEDICINE

## 2019-10-25 PROCEDURE — 2709999900 HC NON-CHARGEABLE SUPPLY: Performed by: INTERNAL MEDICINE

## 2019-10-25 PROCEDURE — 3700000001 HC ADD 15 MINUTES (ANESTHESIA): Performed by: INTERNAL MEDICINE

## 2019-10-25 PROCEDURE — 3609010600 HC COLONOSCOPY POLYPECTOMY SNARE/COLD BIOPSY: Performed by: INTERNAL MEDICINE

## 2019-10-25 PROCEDURE — 2580000003 HC RX 258: Performed by: ANESTHESIOLOGY

## 2019-10-25 RX ORDER — OXYCODONE HYDROCHLORIDE AND ACETAMINOPHEN 5; 325 MG/1; MG/1
1 TABLET ORAL EVERY 6 HOURS PRN
COMMUNITY

## 2019-10-25 RX ORDER — LIDOCAINE HYDROCHLORIDE 10 MG/ML
INJECTION, SOLUTION EPIDURAL; INFILTRATION; INTRACAUDAL; PERINEURAL PRN
Status: DISCONTINUED | OUTPATIENT
Start: 2019-10-25 | End: 2019-10-25 | Stop reason: SDUPTHER

## 2019-10-25 RX ORDER — SODIUM CHLORIDE, SODIUM LACTATE, POTASSIUM CHLORIDE, CALCIUM CHLORIDE 600; 310; 30; 20 MG/100ML; MG/100ML; MG/100ML; MG/100ML
INJECTION, SOLUTION INTRAVENOUS CONTINUOUS
Status: DISCONTINUED | OUTPATIENT
Start: 2019-10-25 | End: 2019-10-25 | Stop reason: HOSPADM

## 2019-10-25 RX ORDER — PROPOFOL 10 MG/ML
INJECTION, EMULSION INTRAVENOUS PRN
Status: DISCONTINUED | OUTPATIENT
Start: 2019-10-25 | End: 2019-10-25 | Stop reason: SDUPTHER

## 2019-10-25 RX ADMIN — SODIUM CHLORIDE, POTASSIUM CHLORIDE, SODIUM LACTATE AND CALCIUM CHLORIDE: 600; 310; 30; 20 INJECTION, SOLUTION INTRAVENOUS at 07:27

## 2019-10-25 RX ADMIN — LIDOCAINE HYDROCHLORIDE 50 MG: 10 INJECTION, SOLUTION EPIDURAL; INFILTRATION; INTRACAUDAL at 08:53

## 2019-10-25 RX ADMIN — PROPOFOL 350 MG: 10 INJECTION, EMULSION INTRAVENOUS at 08:53

## 2019-10-25 ASSESSMENT — PULMONARY FUNCTION TESTS
PIF_VALUE: 1
PIF_VALUE: 0
PIF_VALUE: 1
PIF_VALUE: 1
PIF_VALUE: 0
PIF_VALUE: 1
PIF_VALUE: 0
PIF_VALUE: 1
PIF_VALUE: 0
PIF_VALUE: 1
PIF_VALUE: 0

## 2019-10-25 ASSESSMENT — PAIN SCALES - GENERAL
PAINLEVEL_OUTOF10: 4
PAINLEVEL_OUTOF10: 4
PAINLEVEL_OUTOF10: 0
PAINLEVEL_OUTOF10: 4

## 2019-10-25 ASSESSMENT — PAIN DESCRIPTION - LOCATION: LOCATION: ABDOMEN

## 2019-10-25 ASSESSMENT — PAIN - FUNCTIONAL ASSESSMENT
PAIN_FUNCTIONAL_ASSESSMENT: 0-10
PAIN_FUNCTIONAL_ASSESSMENT: ACTIVITIES ARE NOT PREVENTED

## 2019-10-25 ASSESSMENT — PAIN DESCRIPTION - DESCRIPTORS: DESCRIPTORS: CONSTANT;CRAMPING

## 2019-10-25 ASSESSMENT — PAIN DESCRIPTION - FREQUENCY: FREQUENCY: CONTINUOUS

## 2019-10-25 ASSESSMENT — LIFESTYLE VARIABLES: SMOKING_STATUS: 1

## 2019-10-25 ASSESSMENT — PAIN DESCRIPTION - PAIN TYPE: TYPE: SURGICAL PAIN

## 2019-10-25 ASSESSMENT — PAIN DESCRIPTION - ONSET: ONSET: GRADUAL

## 2019-10-25 ASSESSMENT — PAIN DESCRIPTION - ORIENTATION: ORIENTATION: MID

## 2019-10-28 LAB — SURGICAL PATHOLOGY REPORT: NORMAL

## 2019-10-29 DIAGNOSIS — E03.9 ACQUIRED HYPOTHYROIDISM: Primary | ICD-10-CM

## 2019-10-29 RX ORDER — LEVOTHYROXINE SODIUM 0.07 MG/1
75 TABLET ORAL DAILY
Qty: 30 TABLET | Refills: 2 | Status: SHIPPED | OUTPATIENT
Start: 2019-10-29

## 2019-10-30 PROBLEM — Z86.010 HISTORY OF COLON POLYPS: Status: ACTIVE | Noted: 2019-10-25

## 2019-10-31 ENCOUNTER — OFFICE VISIT (OUTPATIENT)
Dept: FAMILY MEDICINE CLINIC | Age: 39
End: 2019-10-31
Payer: COMMERCIAL

## 2019-10-31 VITALS
SYSTOLIC BLOOD PRESSURE: 125 MMHG | RESPIRATION RATE: 16 BRPM | WEIGHT: 161.6 LBS | OXYGEN SATURATION: 95 % | HEART RATE: 88 BPM | HEIGHT: 70 IN | DIASTOLIC BLOOD PRESSURE: 81 MMHG | BODY MASS INDEX: 23.13 KG/M2

## 2019-10-31 DIAGNOSIS — K57.90 DIVERTICULOSIS: ICD-10-CM

## 2019-10-31 DIAGNOSIS — E03.9 ACQUIRED HYPOTHYROIDISM: ICD-10-CM

## 2019-10-31 DIAGNOSIS — F32.1 CURRENT MODERATE EPISODE OF MAJOR DEPRESSIVE DISORDER WITHOUT PRIOR EPISODE (HCC): ICD-10-CM

## 2019-10-31 DIAGNOSIS — R19.8 IRREGULAR BOWEL HABITS: Primary | ICD-10-CM

## 2019-10-31 PROCEDURE — G8484 FLU IMMUNIZE NO ADMIN: HCPCS | Performed by: FAMILY MEDICINE

## 2019-10-31 PROCEDURE — G8427 DOCREV CUR MEDS BY ELIG CLIN: HCPCS | Performed by: FAMILY MEDICINE

## 2019-10-31 PROCEDURE — 4004F PT TOBACCO SCREEN RCVD TLK: CPT | Performed by: FAMILY MEDICINE

## 2019-10-31 PROCEDURE — 99214 OFFICE O/P EST MOD 30 MIN: CPT | Performed by: FAMILY MEDICINE

## 2019-10-31 PROCEDURE — G8420 CALC BMI NORM PARAMETERS: HCPCS | Performed by: FAMILY MEDICINE

## 2019-10-31 RX ORDER — PROMETHAZINE HYDROCHLORIDE 25 MG/1
25 TABLET ORAL EVERY 6 HOURS PRN
COMMUNITY

## 2019-10-31 RX ORDER — ALPRAZOLAM 0.25 MG/1
0.25 TABLET ORAL 2 TIMES DAILY
COMMUNITY
End: 2021-01-01

## 2019-10-31 RX ORDER — PAROXETINE HYDROCHLORIDE 20 MG/1
20 TABLET, FILM COATED ORAL NIGHTLY
Qty: 30 TABLET | Refills: 11 | Status: SHIPPED | OUTPATIENT
Start: 2019-10-31 | End: 2021-01-01

## 2019-10-31 ASSESSMENT — PATIENT HEALTH QUESTIONNAIRE - PHQ9
SUM OF ALL RESPONSES TO PHQ QUESTIONS 1-9: 0
SUM OF ALL RESPONSES TO PHQ9 QUESTIONS 1 & 2: 0
1. LITTLE INTEREST OR PLEASURE IN DOING THINGS: 0
SUM OF ALL RESPONSES TO PHQ QUESTIONS 1-9: 0
2. FEELING DOWN, DEPRESSED OR HOPELESS: 0

## 2019-11-25 ENCOUNTER — TELEPHONE (OUTPATIENT)
Dept: GASTROENTEROLOGY | Age: 39
End: 2019-11-25

## 2021-01-01 ENCOUNTER — APPOINTMENT (OUTPATIENT)
Dept: GENERAL RADIOLOGY | Age: 41
DRG: 231 | End: 2021-01-01
Payer: COMMERCIAL

## 2021-01-01 ENCOUNTER — APPOINTMENT (OUTPATIENT)
Dept: GENERAL RADIOLOGY | Age: 41
DRG: 911 | End: 2021-01-01
Payer: COMMERCIAL

## 2021-01-01 ENCOUNTER — HOSPITAL ENCOUNTER (INPATIENT)
Age: 41
LOS: 9 days | Discharge: LEFT BEFORE DISCHARGE | DRG: 231 | End: 2021-03-09
Attending: EMERGENCY MEDICINE | Admitting: SURGERY
Payer: COMMERCIAL

## 2021-01-01 ENCOUNTER — APPOINTMENT (OUTPATIENT)
Dept: CT IMAGING | Age: 41
DRG: 231 | End: 2021-01-01
Payer: COMMERCIAL

## 2021-01-01 ENCOUNTER — ANESTHESIA EVENT (OUTPATIENT)
Dept: OPERATING ROOM | Age: 41
DRG: 231 | End: 2021-01-01
Payer: COMMERCIAL

## 2021-01-01 ENCOUNTER — ANESTHESIA (OUTPATIENT)
Dept: OPERATING ROOM | Age: 41
DRG: 911 | End: 2021-01-01
Payer: COMMERCIAL

## 2021-01-01 ENCOUNTER — HOSPITAL ENCOUNTER (INPATIENT)
Age: 41
LOS: 1 days | DRG: 911 | End: 2021-06-20
Attending: EMERGENCY MEDICINE | Admitting: SURGERY
Payer: COMMERCIAL

## 2021-01-01 ENCOUNTER — ANESTHESIA (OUTPATIENT)
Dept: OPERATING ROOM | Age: 41
DRG: 231 | End: 2021-01-01
Payer: COMMERCIAL

## 2021-01-01 ENCOUNTER — HOSPITAL ENCOUNTER (EMERGENCY)
Age: 41
Discharge: LEFT BEFORE DISCHARGE | End: 2021-03-17
Attending: EMERGENCY MEDICINE
Payer: COMMERCIAL

## 2021-01-01 ENCOUNTER — ANESTHESIA EVENT (OUTPATIENT)
Dept: OPERATING ROOM | Age: 41
DRG: 911 | End: 2021-01-01
Payer: COMMERCIAL

## 2021-01-01 VITALS
TEMPERATURE: 98.2 F | RESPIRATION RATE: 19 BRPM | SYSTOLIC BLOOD PRESSURE: 145 MMHG | DIASTOLIC BLOOD PRESSURE: 94 MMHG | OXYGEN SATURATION: 93 % | WEIGHT: 182.32 LBS | HEIGHT: 69 IN | BODY MASS INDEX: 27 KG/M2 | HEART RATE: 95 BPM

## 2021-01-01 VITALS — SYSTOLIC BLOOD PRESSURE: 157 MMHG | OXYGEN SATURATION: 100 % | DIASTOLIC BLOOD PRESSURE: 106 MMHG | TEMPERATURE: 99.1 F

## 2021-01-01 VITALS
OXYGEN SATURATION: 99 % | HEIGHT: 70 IN | BODY MASS INDEX: 22.9 KG/M2 | HEART RATE: 114 BPM | DIASTOLIC BLOOD PRESSURE: 87 MMHG | RESPIRATION RATE: 15 BRPM | WEIGHT: 160 LBS | TEMPERATURE: 98.6 F | SYSTOLIC BLOOD PRESSURE: 131 MMHG

## 2021-01-01 VITALS — TEMPERATURE: 92 F | DIASTOLIC BLOOD PRESSURE: 24 MMHG | SYSTOLIC BLOOD PRESSURE: 35 MMHG

## 2021-01-01 VITALS — DIASTOLIC BLOOD PRESSURE: 116 MMHG | SYSTOLIC BLOOD PRESSURE: 147 MMHG | OXYGEN SATURATION: 92 % | TEMPERATURE: 98.6 F
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
## 2021-01-01 DIAGNOSIS — R10.0 ACUTE ABDOMEN: Primary | ICD-10-CM

## 2021-01-01 DIAGNOSIS — I46.8 TRAUMATIC CARDIAC ARREST (HCC): Primary | ICD-10-CM

## 2021-01-01 DIAGNOSIS — Z87.898 NO POST-OP COMPLICATIONS: ICD-10-CM

## 2021-01-01 DIAGNOSIS — R89.9 ABNORMAL LABORATORY TEST: Primary | ICD-10-CM

## 2021-01-01 LAB
ABO/RH: NORMAL
ABSOLUTE EOS #: 0.05 K/UL (ref 0–0.44)
ABSOLUTE EOS #: 0.14 K/UL (ref 0–0.44)
ABSOLUTE EOS #: 0.15 K/UL (ref 0–0.44)
ABSOLUTE EOS #: 0.15 K/UL (ref 0–0.44)
ABSOLUTE EOS #: 0.17 K/UL (ref 0–0.4)
ABSOLUTE EOS #: 0.25 K/UL (ref 0–0.44)
ABSOLUTE EOS #: 0.29 K/UL (ref 0–0.4)
ABSOLUTE EOS #: 0.46 K/UL (ref 0–0.44)
ABSOLUTE EOS #: 0.61 K/UL (ref 0–0.4)
ABSOLUTE EOS #: <0.03 K/UL (ref 0–0.44)
ABSOLUTE IMMATURE GRANULOCYTE: 0.05 K/UL (ref 0–0.3)
ABSOLUTE IMMATURE GRANULOCYTE: 0.06 K/UL (ref 0–0.3)
ABSOLUTE IMMATURE GRANULOCYTE: 0.09 K/UL (ref 0–0.3)
ABSOLUTE IMMATURE GRANULOCYTE: 0.12 K/UL (ref 0–0.3)
ABSOLUTE IMMATURE GRANULOCYTE: 0.14 K/UL (ref 0–0.3)
ABSOLUTE IMMATURE GRANULOCYTE: 0.15 K/UL (ref 0–0.3)
ABSOLUTE IMMATURE GRANULOCYTE: 0.22 K/UL (ref 0–0.3)
ABSOLUTE IMMATURE GRANULOCYTE: 0.33 K/UL (ref 0–0.3)
ABSOLUTE IMMATURE GRANULOCYTE: 0.41 K/UL (ref 0–0.3)
ABSOLUTE IMMATURE GRANULOCYTE: ABNORMAL K/UL (ref 0–0.3)
ABSOLUTE LYMPH #: 0.86 K/UL (ref 1.1–3.7)
ABSOLUTE LYMPH #: 0.86 K/UL (ref 1.1–3.7)
ABSOLUTE LYMPH #: 1.04 K/UL (ref 1.1–3.7)
ABSOLUTE LYMPH #: 1.06 K/UL (ref 1.1–3.7)
ABSOLUTE LYMPH #: 1.22 K/UL (ref 1.1–3.7)
ABSOLUTE LYMPH #: 1.23 K/UL (ref 1.1–3.7)
ABSOLUTE LYMPH #: 1.27 K/UL (ref 1.1–3.7)
ABSOLUTE LYMPH #: 1.33 K/UL (ref 1–4.8)
ABSOLUTE LYMPH #: 1.62 K/UL (ref 1.1–3.7)
ABSOLUTE LYMPH #: 1.63 K/UL (ref 1–4.8)
ABSOLUTE LYMPH #: 1.97 K/UL (ref 1.1–3.7)
ABSOLUTE LYMPH #: 2.19 K/UL (ref 1–4.8)
ABSOLUTE MONO #: 0.19 K/UL (ref 0.1–1.3)
ABSOLUTE MONO #: 0.34 K/UL (ref 0.1–1.2)
ABSOLUTE MONO #: 0.38 K/UL (ref 0.1–1.2)
ABSOLUTE MONO #: 0.54 K/UL (ref 0.1–1.2)
ABSOLUTE MONO #: 0.62 K/UL (ref 0.1–1.2)
ABSOLUTE MONO #: 0.64 K/UL (ref 0.1–1.2)
ABSOLUTE MONO #: 0.72 K/UL (ref 0.1–1.2)
ABSOLUTE MONO #: 0.81 K/UL (ref 0.1–1.2)
ABSOLUTE MONO #: 0.81 K/UL (ref 0.1–1.2)
ABSOLUTE MONO #: 0.82 K/UL (ref 0.1–0.8)
ABSOLUTE MONO #: 0.83 K/UL (ref 0.1–0.8)
ABSOLUTE MONO #: 0.83 K/UL (ref 0.1–1.2)
ALBUMIN SERPL-MCNC: 2.1 G/DL (ref 3.5–5.2)
ALBUMIN SERPL-MCNC: 4.3 G/DL (ref 3.5–5.2)
ALBUMIN/GLOBULIN RATIO: 0.7 (ref 1–2.5)
ALBUMIN/GLOBULIN RATIO: 1.3 (ref 1–2.5)
ALLEN TEST: ABNORMAL
ALLEN TEST: NORMAL
ALP BLD-CCNC: 44 U/L (ref 40–129)
ALP BLD-CCNC: 71 U/L (ref 40–129)
ALT SERPL-CCNC: 10 U/L (ref 5–41)
ALT SERPL-CCNC: 30 U/L (ref 5–41)
ANION GAP SERPL CALCULATED.3IONS-SCNC: 10 MMOL/L (ref 9–17)
ANION GAP SERPL CALCULATED.3IONS-SCNC: 11 MMOL/L (ref 9–17)
ANION GAP SERPL CALCULATED.3IONS-SCNC: 14 MMOL/L (ref 9–17)
ANION GAP SERPL CALCULATED.3IONS-SCNC: 36 MMOL/L (ref 9–17)
ANION GAP SERPL CALCULATED.3IONS-SCNC: 6 MMOL/L (ref 9–17)
ANION GAP SERPL CALCULATED.3IONS-SCNC: 7 MMOL/L (ref 9–17)
ANION GAP SERPL CALCULATED.3IONS-SCNC: 7 MMOL/L (ref 9–17)
ANION GAP SERPL CALCULATED.3IONS-SCNC: 8 MMOL/L (ref 9–17)
ANION GAP SERPL CALCULATED.3IONS-SCNC: 9 MMOL/L (ref 9–17)
ANION GAP: 11 MMOL/L (ref 7–16)
ANTIBODY SCREEN: NEGATIVE
ARM BAND NUMBER: NORMAL
AST SERPL-CCNC: 19 U/L
AST SERPL-CCNC: 38 U/L
ATYPICAL LYMPHOCYTE ABSOLUTE COUNT: 0.17 K/UL
ATYPICAL LYMPHOCYTE ABSOLUTE COUNT: 0.27 K/UL
ATYPICAL LYMPHOCYTES: 1 %
ATYPICAL LYMPHOCYTES: 2 %
BASOPHILS # BLD: 0 % (ref 0–2)
BASOPHILS # BLD: 1 % (ref 0–2)
BASOPHILS # BLD: 1 % (ref 0–2)
BASOPHILS ABSOLUTE: 0 K/UL (ref 0–0.2)
BASOPHILS ABSOLUTE: 0 K/UL (ref 0–0.2)
BASOPHILS ABSOLUTE: 0.03 K/UL (ref 0–0.2)
BASOPHILS ABSOLUTE: 0.04 K/UL (ref 0–0.2)
BASOPHILS ABSOLUTE: 0.1 K/UL (ref 0–0.2)
BASOPHILS ABSOLUTE: 0.2 K/UL (ref 0–0.2)
BASOPHILS ABSOLUTE: <0.03 K/UL (ref 0–0.2)
BILIRUB SERPL-MCNC: 0.5 MG/DL (ref 0.3–1.2)
BILIRUB SERPL-MCNC: 0.94 MG/DL (ref 0.3–1.2)
BILIRUBIN URINE: NEGATIVE
BLD PROD TYP BPU: NORMAL
BLOOD BANK SPECIMEN: ABNORMAL
BUN BLDV-MCNC: 10 MG/DL (ref 6–20)
BUN BLDV-MCNC: 11 MG/DL (ref 6–20)
BUN BLDV-MCNC: 11 MG/DL (ref 6–20)
BUN BLDV-MCNC: 11 MG/DL (ref 8–23)
BUN BLDV-MCNC: 14 MG/DL (ref 6–20)
BUN BLDV-MCNC: 17 MG/DL (ref 6–20)
BUN BLDV-MCNC: 18 MG/DL (ref 6–20)
BUN BLDV-MCNC: 18 MG/DL (ref 6–20)
BUN BLDV-MCNC: 19 MG/DL (ref 6–20)
BUN BLDV-MCNC: 20 MG/DL (ref 6–20)
BUN BLDV-MCNC: 20 MG/DL (ref 6–20)
BUN BLDV-MCNC: 9 MG/DL (ref 6–20)
BUN/CREAT BLD: ABNORMAL (ref 9–20)
C-REACTIVE PROTEIN: 62.2 MG/L (ref 0–5)
CALCIUM IONIZED: 0.87 MMOL/L (ref 1.13–1.33)
CALCIUM IONIZED: 1.06 MMOL/L (ref 1.13–1.33)
CALCIUM IONIZED: 1.12 MMOL/L (ref 1.13–1.33)
CALCIUM IONIZED: 1.15 MMOL/L (ref 1.13–1.33)
CALCIUM SERPL-MCNC: 7.4 MG/DL (ref 8.6–10.4)
CALCIUM SERPL-MCNC: 7.6 MG/DL (ref 8.6–10.4)
CALCIUM SERPL-MCNC: 7.6 MG/DL (ref 8.6–10.4)
CALCIUM SERPL-MCNC: 7.7 MG/DL (ref 8.6–10.4)
CALCIUM SERPL-MCNC: 7.8 MG/DL (ref 8.6–10.4)
CALCIUM SERPL-MCNC: 7.8 MG/DL (ref 8.6–10.4)
CALCIUM SERPL-MCNC: 7.9 MG/DL (ref 8.6–10.4)
CALCIUM SERPL-MCNC: 8 MG/DL (ref 8.6–10.4)
CALCIUM SERPL-MCNC: 8.4 MG/DL (ref 8.6–10.4)
CALCIUM SERPL-MCNC: 8.7 MG/DL (ref 8.6–10.4)
CALCIUM SERPL-MCNC: 9.4 MG/DL (ref 8.6–10.4)
CARBOXYHEMOGLOBIN: 0.5 % (ref 0–5)
CARBOXYHEMOGLOBIN: 1.2 % (ref 0–5)
CARBOXYHEMOGLOBIN: 2.4 % (ref 0–5)
CARBOXYHEMOGLOBIN: 7.6 % (ref 0–5)
CARBOXYHEMOGLOBIN: ABNORMAL %
CARCINOEMBRYONIC ANTIGEN: 4.6 NG/ML
CHLORIDE BLD-SCNC: 101 MMOL/L (ref 98–107)
CHLORIDE BLD-SCNC: 104 MMOL/L (ref 98–107)
CHLORIDE BLD-SCNC: 105 MMOL/L (ref 98–107)
CHLORIDE BLD-SCNC: 106 MMOL/L (ref 98–107)
CHLORIDE BLD-SCNC: 106 MMOL/L (ref 98–107)
CHLORIDE BLD-SCNC: 107 MMOL/L (ref 98–107)
CHLORIDE BLD-SCNC: 109 MMOL/L (ref 98–107)
CHLORIDE BLD-SCNC: 111 MMOL/L (ref 98–107)
CHLORIDE BLD-SCNC: 96 MMOL/L (ref 98–107)
CHLORIDE, WHOLE BLOOD: 108 MMOL/L (ref 98–110)
CHLORIDE, WHOLE BLOOD: 109 MMOL/L (ref 98–110)
CHLORIDE, WHOLE BLOOD: 114 MMOL/L (ref 98–110)
CO2: 12 MMOL/L (ref 20–31)
CO2: 21 MMOL/L (ref 20–31)
CO2: 21 MMOL/L (ref 20–31)
CO2: 22 MMOL/L (ref 20–31)
CO2: 22 MMOL/L (ref 20–31)
CO2: 23 MMOL/L (ref 20–31)
CO2: 23 MMOL/L (ref 20–31)
CO2: 24 MMOL/L (ref 20–31)
CO2: 25 MMOL/L (ref 20–31)
CO2: 26 MMOL/L (ref 20–31)
CO2: 26 MMOL/L (ref 20–31)
CO2: 27 MMOL/L (ref 20–31)
COLOR: YELLOW
COMMENT UA: ABNORMAL
CREAT SERPL-MCNC: 0.64 MG/DL (ref 0.7–1.2)
CREAT SERPL-MCNC: 0.65 MG/DL (ref 0.7–1.2)
CREAT SERPL-MCNC: 0.65 MG/DL (ref 0.7–1.2)
CREAT SERPL-MCNC: 0.66 MG/DL (ref 0.7–1.2)
CREAT SERPL-MCNC: 0.66 MG/DL (ref 0.7–1.2)
CREAT SERPL-MCNC: 0.67 MG/DL (ref 0.7–1.2)
CREAT SERPL-MCNC: 0.74 MG/DL (ref 0.7–1.2)
CREAT SERPL-MCNC: 0.75 MG/DL (ref 0.7–1.2)
CREAT SERPL-MCNC: 0.77 MG/DL (ref 0.7–1.2)
CREAT SERPL-MCNC: 0.78 MG/DL (ref 0.7–1.2)
CREAT SERPL-MCNC: 0.83 MG/DL (ref 0.7–1.2)
CREAT SERPL-MCNC: 0.89 MG/DL (ref 0.7–1.2)
CREAT SERPL-MCNC: 1.2 MG/DL (ref 0.7–1.2)
CREAT SERPL-MCNC: 1.67 MG/DL (ref 0.7–1.2)
CROSSMATCH RESULT: NORMAL
CULTURE: NO GROWTH
CULTURE: NORMAL
CULTURE: NORMAL
DIFFERENTIAL TYPE: ABNORMAL
DISPENSE STATUS BLOOD BANK: NORMAL
EKG ATRIAL RATE: 102 BPM
EKG ATRIAL RATE: 103 BPM
EKG ATRIAL RATE: 105 BPM
EKG ATRIAL RATE: 134 BPM
EKG ATRIAL RATE: 141 BPM
EKG ATRIAL RATE: 76 BPM
EKG ATRIAL RATE: 97 BPM
EKG P AXIS: -8 DEGREES
EKG P AXIS: 1 DEGREES
EKG P AXIS: 12 DEGREES
EKG P AXIS: 13 DEGREES
EKG P AXIS: 48 DEGREES
EKG P AXIS: 55 DEGREES
EKG P AXIS: 66 DEGREES
EKG P-R INTERVAL: 124 MS
EKG P-R INTERVAL: 124 MS
EKG P-R INTERVAL: 128 MS
EKG P-R INTERVAL: 128 MS
EKG P-R INTERVAL: 134 MS
EKG P-R INTERVAL: 134 MS
EKG P-R INTERVAL: 136 MS
EKG Q-T INTERVAL: 266 MS
EKG Q-T INTERVAL: 274 MS
EKG Q-T INTERVAL: 308 MS
EKG Q-T INTERVAL: 316 MS
EKG Q-T INTERVAL: 346 MS
EKG Q-T INTERVAL: 360 MS
EKG Q-T INTERVAL: 380 MS
EKG QRS DURATION: 66 MS
EKG QRS DURATION: 70 MS
EKG QRS DURATION: 74 MS
EKG QRS DURATION: 78 MS
EKG QRS DURATION: 80 MS
EKG QRS DURATION: 82 MS
EKG QRS DURATION: 84 MS
EKG QTC CALCULATION (BAZETT): 405 MS
EKG QTC CALCULATION (BAZETT): 407 MS
EKG QTC CALCULATION (BAZETT): 407 MS
EKG QTC CALCULATION (BAZETT): 409 MS
EKG QTC CALCULATION (BAZETT): 411 MS
EKG QTC CALCULATION (BAZETT): 453 MS
EKG QTC CALCULATION (BAZETT): 482 MS
EKG R AXIS: 13 DEGREES
EKG R AXIS: 52 DEGREES
EKG R AXIS: 54 DEGREES
EKG R AXIS: 56 DEGREES
EKG R AXIS: 57 DEGREES
EKG R AXIS: 63 DEGREES
EKG R AXIS: 66 DEGREES
EKG T AXIS: 15 DEGREES
EKG T AXIS: 16 DEGREES
EKG T AXIS: 29 DEGREES
EKG T AXIS: 30 DEGREES
EKG T AXIS: 30 DEGREES
EKG T AXIS: 40 DEGREES
EKG T AXIS: 61 DEGREES
EKG VENTRICULAR RATE: 102 BPM
EKG VENTRICULAR RATE: 103 BPM
EKG VENTRICULAR RATE: 105 BPM
EKG VENTRICULAR RATE: 134 BPM
EKG VENTRICULAR RATE: 141 BPM
EKG VENTRICULAR RATE: 76 BPM
EKG VENTRICULAR RATE: 97 BPM
EOSINOPHILS RELATIVE PERCENT: 0 % (ref 1–4)
EOSINOPHILS RELATIVE PERCENT: 1 % (ref 1–4)
EOSINOPHILS RELATIVE PERCENT: 2 % (ref 1–4)
EOSINOPHILS RELATIVE PERCENT: 3 % (ref 0–4)
EOSINOPHILS RELATIVE PERCENT: 3 % (ref 1–4)
EOSINOPHILS RELATIVE PERCENT: 3 % (ref 1–4)
ETHANOL PERCENT: <0.01 %
ETHANOL: <10 MG/DL
EXPIRATION DATE: NORMAL
FIBRINOGEN: 97 MG/DL (ref 140–420)
FIO2: 30
FIO2: 45
FIO2: 50
FIO2: 60
FIO2: ABNORMAL
GFR AFRICAN AMERICAN: >60 ML/MIN
GFR AFRICAN AMERICAN: ABNORMAL ML/MIN
GFR NON-AFRICAN AMERICAN: 60 ML/MIN
GFR NON-AFRICAN AMERICAN: >60 ML/MIN
GFR NON-AFRICAN AMERICAN: ABNORMAL ML/MIN
GFR SERPL CREATININE-BSD FRML MDRD: >60 ML/MIN
GFR SERPL CREATININE-BSD FRML MDRD: ABNORMAL ML/MIN/{1.73_M2}
GLUCOSE BLD-MCNC: 100 MG/DL (ref 70–99)
GLUCOSE BLD-MCNC: 100 MG/DL (ref 70–99)
GLUCOSE BLD-MCNC: 100 MG/DL (ref 74–100)
GLUCOSE BLD-MCNC: 100 MG/DL (ref 75–110)
GLUCOSE BLD-MCNC: 103 MG/DL (ref 74–100)
GLUCOSE BLD-MCNC: 104 MG/DL (ref 70–99)
GLUCOSE BLD-MCNC: 105 MG/DL (ref 75–110)
GLUCOSE BLD-MCNC: 108 MG/DL (ref 70–99)
GLUCOSE BLD-MCNC: 110 MG/DL (ref 70–99)
GLUCOSE BLD-MCNC: 110 MG/DL (ref 70–99)
GLUCOSE BLD-MCNC: 112 MG/DL (ref 70–99)
GLUCOSE BLD-MCNC: 112 MG/DL (ref 74–100)
GLUCOSE BLD-MCNC: 115 MG/DL (ref 74–100)
GLUCOSE BLD-MCNC: 116 MG/DL (ref 70–99)
GLUCOSE BLD-MCNC: 117 MG/DL (ref 70–99)
GLUCOSE BLD-MCNC: 118 MG/DL (ref 75–110)
GLUCOSE BLD-MCNC: 123 MG/DL (ref 74–100)
GLUCOSE BLD-MCNC: 125 MG/DL (ref 75–110)
GLUCOSE BLD-MCNC: 130 MG/DL (ref 70–99)
GLUCOSE BLD-MCNC: 136 MG/DL (ref 70–99)
GLUCOSE BLD-MCNC: 139 MG/DL (ref 74–100)
GLUCOSE BLD-MCNC: 168 MG/DL (ref 70–99)
GLUCOSE BLD-MCNC: 175 MG/DL (ref 74–100)
GLUCOSE BLD-MCNC: 324 MG/DL (ref 75–110)
GLUCOSE BLD-MCNC: 356 MG/DL (ref 75–110)
GLUCOSE BLD-MCNC: 78 MG/DL (ref 70–99)
GLUCOSE BLD-MCNC: 98 MG/DL (ref 70–99)
GLUCOSE URINE: NEGATIVE
HCG QUALITATIVE: ABNORMAL
HCO3 ARTERIAL: 10.6 MMOL/L (ref 22–27)
HCO3 ARTERIAL: 21.8 MMOL/L (ref 22–27)
HCO3 VENOUS: 23.3 MMOL/L (ref 24–30)
HCO3 VENOUS: 24.2 MMOL/L (ref 22–29)
HCO3 VENOUS: 4.3 MMOL/L (ref 24–30)
HCO3 VENOUS: ABNORMAL MMOL/L (ref 24–30)
HCT VFR BLD CALC: 23.9 % (ref 40.7–50.3)
HCT VFR BLD CALC: 24 % (ref 40.7–50.3)
HCT VFR BLD CALC: 24.6 % (ref 40.7–50.3)
HCT VFR BLD CALC: 26.1 % (ref 40.7–50.3)
HCT VFR BLD CALC: 26.2 % (ref 40.7–50.3)
HCT VFR BLD CALC: 26.9 % (ref 41–53)
HCT VFR BLD CALC: 28.3 % (ref 40.7–50.3)
HCT VFR BLD CALC: 29.6 % (ref 40.7–50.3)
HCT VFR BLD CALC: 30.9 % (ref 40.7–50.3)
HCT VFR BLD CALC: 31 %
HCT VFR BLD CALC: 31 % (ref 40.7–50.3)
HCT VFR BLD CALC: 33.2 % (ref 40.7–50.3)
HCT VFR BLD CALC: 35.4 %
HCT VFR BLD CALC: 36.3 % (ref 40.7–50.3)
HCT VFR BLD CALC: 46.8 % (ref 40.7–50.3)
HCT VFR BLD CALC: 48 % (ref 40.7–50.3)
HCT VFR BLD CALC: ABNORMAL %
HEMOGLOBIN: 10 G/DL (ref 13–17)
HEMOGLOBIN: 10 GM/DL
HEMOGLOBIN: 10.5 G/DL (ref 13–17)
HEMOGLOBIN: 11.5 GM/DL
HEMOGLOBIN: 11.8 G/DL (ref 13–17)
HEMOGLOBIN: 13.7 G/DL (ref 13–17)
HEMOGLOBIN: 15.3 G/DL (ref 13–17)
HEMOGLOBIN: 7.6 G/DL (ref 13–17)
HEMOGLOBIN: 7.6 G/DL (ref 13–17)
HEMOGLOBIN: 7.7 G/DL (ref 13–17)
HEMOGLOBIN: 8.6 G/DL (ref 13–17)
HEMOGLOBIN: 8.7 G/DL (ref 13–17)
HEMOGLOBIN: 8.8 G/DL (ref 13.5–17.5)
HEMOGLOBIN: 8.9 G/DL (ref 13–17)
HEMOGLOBIN: 9.1 G/DL (ref 13–17)
HEMOGLOBIN: 9.6 G/DL (ref 13–17)
HEMOGLOBIN: ABNORMAL G/DL
IMMATURE GRANULOCYTES: 0 %
IMMATURE GRANULOCYTES: 1 %
IMMATURE GRANULOCYTES: 2 %
IMMATURE GRANULOCYTES: ABNORMAL %
INR BLD: 1.1
INR BLD: 1.6
INR BLD: ABNORMAL
KETONES, URINE: NEGATIVE
LACTIC ACID, SEPSIS WHOLE BLOOD: 1.5 MMOL/L (ref 0.5–1.9)
LACTIC ACID, SEPSIS: NORMAL MMOL/L (ref 0.5–1.9)
LACTIC ACID, WHOLE BLOOD: 1.2 MMOL/L (ref 0.7–2.1)
LACTIC ACID, WHOLE BLOOD: 1.6 MMOL/L (ref 0.7–2.1)
LACTIC ACID, WHOLE BLOOD: 2.6 MMOL/L (ref 0.7–2.1)
LACTIC ACID: ABNORMAL MMOL/L
LACTIC ACID: NORMAL MMOL/L
LEUKOCYTE ESTERASE, URINE: NEGATIVE
LIPASE: 19 U/L (ref 13–60)
LYMPHOCYTES # BLD: 10 % (ref 24–43)
LYMPHOCYTES # BLD: 12 % (ref 24–43)
LYMPHOCYTES # BLD: 23 % (ref 24–44)
LYMPHOCYTES # BLD: 6 % (ref 24–43)
LYMPHOCYTES # BLD: 8 % (ref 24–43)
LYMPHOCYTES # BLD: 8 % (ref 24–44)
LYMPHOCYTES # BLD: 8 % (ref 24–44)
LYMPHOCYTES # BLD: 9 % (ref 24–43)
Lab: NORMAL
MAGNESIUM: 1.7 MG/DL (ref 1.6–2.6)
MAGNESIUM: 1.9 MG/DL (ref 1.6–2.6)
MAGNESIUM: 1.9 MG/DL (ref 1.6–2.6)
MAGNESIUM: 2.1 MG/DL (ref 1.6–2.6)
MAGNESIUM: 2.3 MG/DL (ref 1.6–2.6)
MAGNESIUM: 2.6 MG/DL (ref 1.6–2.6)
MCH RBC QN AUTO: 29.7 PG (ref 26–34)
MCH RBC QN AUTO: 29.8 PG (ref 25.2–33.5)
MCH RBC QN AUTO: 30 PG (ref 25.2–33.5)
MCH RBC QN AUTO: 30.1 PG (ref 25.2–33.5)
MCH RBC QN AUTO: 30.1 PG (ref 25.2–33.5)
MCH RBC QN AUTO: 30.3 PG (ref 25.2–33.5)
MCH RBC QN AUTO: 30.4 PG (ref 25.2–33.5)
MCH RBC QN AUTO: 30.4 PG (ref 25.2–33.5)
MCH RBC QN AUTO: 30.5 PG (ref 25.2–33.5)
MCH RBC QN AUTO: 30.9 PG (ref 25.2–33.5)
MCH RBC QN AUTO: 31 PG (ref 25.2–33.5)
MCH RBC QN AUTO: ABNORMAL PG
MCHC RBC AUTO-ENTMCNC: 28.5 G/DL (ref 28.4–34.8)
MCHC RBC AUTO-ENTMCNC: 30.7 G/DL (ref 28.4–34.8)
MCHC RBC AUTO-ENTMCNC: 31.1 G/DL (ref 28.4–34.8)
MCHC RBC AUTO-ENTMCNC: 31.3 G/DL (ref 28.4–34.8)
MCHC RBC AUTO-ENTMCNC: 31.4 G/DL (ref 28.4–34.8)
MCHC RBC AUTO-ENTMCNC: 31.6 G/DL (ref 28.4–34.8)
MCHC RBC AUTO-ENTMCNC: 31.7 G/DL (ref 28.4–34.8)
MCHC RBC AUTO-ENTMCNC: 31.8 G/DL (ref 28.4–34.8)
MCHC RBC AUTO-ENTMCNC: 32.3 G/DL (ref 28.4–34.8)
MCHC RBC AUTO-ENTMCNC: 32.5 G/DL (ref 28.4–34.8)
MCHC RBC AUTO-ENTMCNC: 32.7 G/DL (ref 28.4–34.8)
MCHC RBC AUTO-ENTMCNC: 32.7 G/DL (ref 31–37)
MCHC RBC AUTO-ENTMCNC: 33.3 G/DL (ref 28.4–34.8)
MCHC RBC AUTO-ENTMCNC: ABNORMAL G/DL
MCV RBC AUTO: 106.2 FL (ref 82.6–102.9)
MCV RBC AUTO: 90.6 FL (ref 80–100)
MCV RBC AUTO: 92.6 FL (ref 82.6–102.9)
MCV RBC AUTO: 92.6 FL (ref 82.6–102.9)
MCV RBC AUTO: 93 FL (ref 82.6–102.9)
MCV RBC AUTO: 94.5 FL (ref 82.6–102.9)
MCV RBC AUTO: 95.1 FL (ref 82.6–102.9)
MCV RBC AUTO: 95.3 FL (ref 82.6–102.9)
MCV RBC AUTO: 95.7 FL (ref 82.6–102.9)
MCV RBC AUTO: 96 FL (ref 82.6–102.9)
MCV RBC AUTO: 96.4 FL (ref 82.6–102.9)
MCV RBC AUTO: 97 FL (ref 82.6–102.9)
MCV RBC AUTO: 97.8 FL (ref 82.6–102.9)
MCV RBC AUTO: ABNORMAL FL
METHEMOGLOBIN: ABNORMAL %
METHEMOGLOBIN: ABNORMAL % (ref 0–1.5)
MODE: ABNORMAL
MODE: NORMAL
MONOCYTES # BLD: 2 % (ref 1–7)
MONOCYTES # BLD: 3 % (ref 3–12)
MONOCYTES # BLD: 4 % (ref 1–7)
MONOCYTES # BLD: 4 % (ref 3–12)
MONOCYTES # BLD: 5 % (ref 1–7)
MONOCYTES # BLD: 5 % (ref 3–12)
MONOCYTES # BLD: 6 % (ref 3–12)
MONOCYTES # BLD: 6 % (ref 3–12)
MONOCYTES # BLD: 7 % (ref 3–12)
MORPHOLOGY: ABNORMAL
MORPHOLOGY: NORMAL
MRSA, DNA, NASAL: NORMAL
NEGATIVE BASE EXCESS, ART: 2.1 MMOL/L (ref 0–2)
NEGATIVE BASE EXCESS, ART: 20.4 MMOL/L (ref 0–2)
NEGATIVE BASE EXCESS, ART: ABNORMAL (ref 0–2)
NEGATIVE BASE EXCESS, ART: NORMAL (ref 0–2)
NEGATIVE BASE EXCESS, VEN: 1 (ref 0–2)
NEGATIVE BASE EXCESS, VEN: ABNORMAL MMOL/L (ref 0–2)
NITRITE, URINE: NEGATIVE
NOTIFICATION TIME: ABNORMAL
NOTIFICATION: ABNORMAL
NRBC AUTOMATED: 0 PER 100 WBC
NRBC AUTOMATED: 0.1 PER 100 WBC
NRBC AUTOMATED: 0.3 PER 100 WBC
NRBC AUTOMATED: ABNORMAL PER 100 WBC
NRBC AUTOMATED: ABNORMAL PER 100 WBC
O2 DEVICE/FLOW/%: ABNORMAL
O2 DEVICE/FLOW/%: NORMAL
O2 SAT, ARTERIAL: 98.8 % (ref 94–100)
O2 SAT, ARTERIAL: 99.2 % (ref 94–100)
O2 SAT, VEN: 49 % (ref 60–85)
O2 SAT, VEN: 84.1 % (ref 60–85)
O2 SAT, VEN: 96.2 % (ref 60–85)
O2 SAT, VEN: ABNORMAL %
OXYHEMOGLOBIN: ABNORMAL % (ref 95–98)
PARTIAL THROMBOPLASTIN TIME: 24.9 SEC (ref 20.5–30.5)
PARTIAL THROMBOPLASTIN TIME: >120 SEC (ref 20.5–30.5)
PARTIAL THROMBOPLASTIN TIME: ABNORMAL SEC
PATIENT TEMP: 34.2
PATIENT TEMP: 35
PATIENT TEMP: 37
PATIENT TEMP: 37
PATIENT TEMP: 37.6
PATIENT TEMP: 38.4
PATIENT TEMP: ABNORMAL
PATIENT TEMP: NORMAL
PCO2 ARTERIAL: 36.2 MMHG (ref 32–45)
PCO2 ARTERIAL: 48.7 MMHG (ref 32–45)
PCO2, ART, TEMP ADJ: 42.5 (ref 32–45)
PCO2, ART, TEMP ADJ: ABNORMAL (ref 32–45)
PCO2, VEN, TEMP ADJ: 33.4 MMHG (ref 39–55)
PCO2, VEN, TEMP ADJ: ABNORMAL MMHG (ref 39–55)
PCO2, VEN, TEMP ADJ: ABNORMAL MMHG (ref 39–55)
PCO2, VEN: 30.2 (ref 39–55)
PCO2, VEN: 36.8 (ref 39–55)
PCO2, VEN: 40.6 MM HG (ref 41–51)
PCO2, VEN: ABNORMAL (ref 39–55)
PDW BLD-RTO: 12.7 % (ref 11.8–14.4)
PDW BLD-RTO: 12.7 % (ref 11.8–14.4)
PDW BLD-RTO: 12.8 % (ref 11.8–14.4)
PDW BLD-RTO: 12.9 % (ref 11.8–14.4)
PDW BLD-RTO: 13 % (ref 11.8–14.4)
PDW BLD-RTO: 13.2 % (ref 11.8–14.4)
PDW BLD-RTO: 13.2 % (ref 11.8–14.4)
PDW BLD-RTO: 13.3 % (ref 11.8–14.4)
PDW BLD-RTO: 13.4 % (ref 11.8–14.4)
PDW BLD-RTO: 13.5 % (ref 11.8–14.4)
PDW BLD-RTO: 13.5 % (ref 11.8–14.4)
PDW BLD-RTO: 14.1 % (ref 11.5–14.9)
PDW BLD-RTO: 15 % (ref 11.8–14.4)
PDW BLD-RTO: ABNORMAL %
PEEP/CPAP: ABNORMAL
PH ARTERIAL: 6.97 (ref 7.35–7.45)
PH ARTERIAL: 7.4 (ref 7.35–7.45)
PH UA: 6 (ref 5–8)
PH VENOUS: 6.7 (ref 7.32–7.42)
PH VENOUS: 7.38 (ref 7.32–7.43)
PH VENOUS: 7.5 (ref 7.32–7.42)
PH VENOUS: ABNORMAL (ref 7.32–7.42)
PH, ART, TEMP ADJ: 7 (ref 7.35–7.45)
PH, ART, TEMP ADJ: ABNORMAL (ref 7.35–7.45)
PH, VEN, TEMP ADJ: 6.72 (ref 7.32–7.42)
PH, VEN, TEMP ADJ: ABNORMAL (ref 7.32–7.42)
PH, VEN, TEMP ADJ: ABNORMAL (ref 7.32–7.42)
PHOSPHORUS: 1 MG/DL (ref 2.5–4.5)
PHOSPHORUS: 1.7 MG/DL (ref 2.5–4.5)
PHOSPHORUS: 1.8 MG/DL (ref 2.5–4.5)
PHOSPHORUS: 2.3 MG/DL (ref 2.5–4.5)
PHOSPHORUS: 2.4 MG/DL (ref 2.5–4.5)
PHOSPHORUS: 2.6 MG/DL (ref 2.5–4.5)
PHOSPHORUS: 2.9 MG/DL (ref 2.5–4.5)
PLATELET # BLD: 137 K/UL (ref 138–453)
PLATELET # BLD: 177 K/UL (ref 138–453)
PLATELET # BLD: 191 K/UL (ref 138–453)
PLATELET # BLD: 209 K/UL (ref 138–453)
PLATELET # BLD: 239 K/UL (ref 138–453)
PLATELET # BLD: 241 K/UL (ref 138–453)
PLATELET # BLD: 277 K/UL (ref 138–453)
PLATELET # BLD: 326 K/UL (ref 138–453)
PLATELET # BLD: 382 K/UL (ref 138–453)
PLATELET # BLD: 418 K/UL (ref 138–453)
PLATELET # BLD: 625 K/UL (ref 138–453)
PLATELET # BLD: 674 K/UL (ref 138–453)
PLATELET # BLD: 829 K/UL (ref 150–450)
PLATELET # BLD: ABNORMAL K/UL
PLATELET ESTIMATE: ABNORMAL
PMV BLD AUTO: 10 FL (ref 8.1–13.5)
PMV BLD AUTO: 10.1 FL (ref 8.1–13.5)
PMV BLD AUTO: 10.1 FL (ref 8.1–13.5)
PMV BLD AUTO: 10.2 FL (ref 8.1–13.5)
PMV BLD AUTO: 6.8 FL (ref 6–12)
PMV BLD AUTO: 9.5 FL (ref 8.1–13.5)
PMV BLD AUTO: 9.6 FL (ref 8.1–13.5)
PMV BLD AUTO: 9.7 FL (ref 8.1–13.5)
PMV BLD AUTO: 9.7 FL (ref 8.1–13.5)
PMV BLD AUTO: 9.8 FL (ref 8.1–13.5)
PMV BLD AUTO: 9.9 FL (ref 8.1–13.5)
PMV BLD AUTO: ABNORMAL FL
PO2 ARTERIAL: 176 MMHG (ref 75–95)
PO2 ARTERIAL: 451 MMHG (ref 75–95)
PO2, ART, TEMP ADJ: 434 MMHG (ref 75–95)
PO2, ART, TEMP ADJ: ABNORMAL MMHG (ref 75–95)
PO2, VEN, TEMP ADJ: 173 MMHG (ref 30–50)
PO2, VEN, TEMP ADJ: ABNORMAL MMHG (ref 30–50)
PO2, VEN, TEMP ADJ: ABNORMAL MMHG (ref 30–50)
PO2, VEN: 189 (ref 30–50)
PO2, VEN: 27 MM HG (ref 30–50)
PO2, VEN: 45.3 (ref 30–50)
PO2, VEN: ABNORMAL (ref 30–50)
POC CHLORIDE: 101 MMOL/L (ref 98–107)
POC CREATININE: 1.33 MG/DL (ref 0.51–1.19)
POC HCO3: 23.8 MMOL/L (ref 21–28)
POC HCO3: 24.1 MMOL/L (ref 21–28)
POC HCO3: 24.5 MMOL/L (ref 21–28)
POC HCO3: 25.3 MMOL/L (ref 21–28)
POC HCO3: 26 MMOL/L (ref 21–28)
POC HCO3: 26 MMOL/L (ref 21–28)
POC HCO3: 26.2 MMOL/L (ref 21–28)
POC HCO3: 27.1 MMOL/L (ref 21–28)
POC HCO3: 27.4 MMOL/L (ref 21–28)
POC HEMATOCRIT: 49 % (ref 41–53)
POC HEMOGLOBIN: 16.7 G/DL (ref 13.5–17.5)
POC IONIZED CALCIUM: 1.02 MMOL/L (ref 1.15–1.33)
POC LACTIC ACID: 0.51 MMOL/L (ref 0.56–1.39)
POC LACTIC ACID: 0.52 MMOL/L (ref 0.56–1.39)
POC LACTIC ACID: 0.62 MMOL/L (ref 0.56–1.39)
POC LACTIC ACID: 0.65 MMOL/L (ref 0.56–1.39)
POC LACTIC ACID: 0.66 MMOL/L (ref 0.56–1.39)
POC LACTIC ACID: 0.66 MMOL/L (ref 0.56–1.39)
POC LACTIC ACID: 0.69 MMOL/L (ref 0.56–1.39)
POC LACTIC ACID: 0.72 MMOL/L (ref 0.56–1.39)
POC LACTIC ACID: 2.76 MMOL/L (ref 0.56–1.39)
POC O2 SATURATION: 100 % (ref 94–98)
POC O2 SATURATION: 94 % (ref 94–98)
POC O2 SATURATION: 95 % (ref 94–98)
POC O2 SATURATION: 95 % (ref 94–98)
POC O2 SATURATION: 96 % (ref 94–98)
POC O2 SATURATION: 96 % (ref 94–98)
POC O2 SATURATION: 97 % (ref 94–98)
POC O2 SATURATION: 97 % (ref 94–98)
POC O2 SATURATION: 99 % (ref 94–98)
POC PCO2 TEMP: 36 MM HG
POC PCO2 TEMP: 39 MM HG
POC PCO2 TEMP: ABNORMAL MM HG
POC PCO2 TEMP: NORMAL MM HG
POC PCO2: 32.9 MM HG (ref 35–48)
POC PCO2: 34 MM HG (ref 35–48)
POC PCO2: 34.2 MM HG (ref 35–48)
POC PCO2: 35 MM HG (ref 35–48)
POC PCO2: 36.4 MM HG (ref 35–48)
POC PCO2: 36.5 MM HG (ref 35–48)
POC PCO2: 36.9 MM HG (ref 35–48)
POC PCO2: 37.6 MM HG (ref 35–48)
POC PCO2: 39.2 MM HG (ref 35–48)
POC PH TEMP: 7.44
POC PH TEMP: 7.49
POC PH TEMP: ABNORMAL
POC PH TEMP: NORMAL
POC PH: 7.42 (ref 7.35–7.45)
POC PH: 7.43 (ref 7.35–7.45)
POC PH: 7.43 (ref 7.35–7.45)
POC PH: 7.45 (ref 7.35–7.45)
POC PH: 7.46 (ref 7.35–7.45)
POC PH: 7.46 (ref 7.35–7.45)
POC PH: 7.47 (ref 7.35–7.45)
POC PH: 7.52 (ref 7.35–7.45)
POC PH: 7.52 (ref 7.35–7.45)
POC PO2 TEMP: 76 MM HG
POC PO2 TEMP: 93 MM HG
POC PO2 TEMP: ABNORMAL MM HG
POC PO2 TEMP: NORMAL MM HG
POC PO2: 147.1 MM HG (ref 83–108)
POC PO2: 184.9 MM HG (ref 83–108)
POC PO2: 66.2 MM HG (ref 83–108)
POC PO2: 69.3 MM HG (ref 83–108)
POC PO2: 73.6 MM HG (ref 83–108)
POC PO2: 74.5 MM HG (ref 83–108)
POC PO2: 77.2 MM HG (ref 83–108)
POC PO2: 89.5 MM HG (ref 83–108)
POC PO2: 89.8 MM HG (ref 83–108)
POC POTASSIUM: 4.4 MMOL/L (ref 3.5–4.5)
POC SODIUM: 136 MMOL/L (ref 138–146)
POSITIVE BASE EXCESS, ART: 0 (ref 0–3)
POSITIVE BASE EXCESS, ART: 0 (ref 0–3)
POSITIVE BASE EXCESS, ART: 1 (ref 0–3)
POSITIVE BASE EXCESS, ART: 2 (ref 0–3)
POSITIVE BASE EXCESS, ART: 4 (ref 0–3)
POSITIVE BASE EXCESS, ART: 4 (ref 0–3)
POSITIVE BASE EXCESS, ART: ABNORMAL MMOL/L (ref 0–2)
POSITIVE BASE EXCESS, ART: ABNORMAL MMOL/L (ref 0–2)
POSITIVE BASE EXCESS, VEN: 1 MMOL/L (ref 0–2)
POSITIVE BASE EXCESS, VEN: ABNORMAL (ref 0–3)
POSITIVE BASE EXCESS, VEN: ABNORMAL MMOL/L (ref 0–2)
POSITIVE BASE EXCESS, VEN: ABNORMAL MMOL/L (ref 0–2)
POTASSIUM SERPL-SCNC: 3.2 MMOL/L (ref 3.7–5.3)
POTASSIUM SERPL-SCNC: 3.4 MMOL/L (ref 3.7–5.3)
POTASSIUM SERPL-SCNC: 3.4 MMOL/L (ref 3.7–5.3)
POTASSIUM SERPL-SCNC: 3.6 MMOL/L (ref 3.7–5.3)
POTASSIUM SERPL-SCNC: 3.7 MMOL/L (ref 3.7–5.3)
POTASSIUM SERPL-SCNC: 3.8 MMOL/L (ref 3.7–5.3)
POTASSIUM SERPL-SCNC: 3.9 MMOL/L (ref 3.7–5.3)
POTASSIUM SERPL-SCNC: 4 MMOL/L (ref 3.7–5.3)
POTASSIUM SERPL-SCNC: 4.5 MMOL/L (ref 3.7–5.3)
POTASSIUM SERPL-SCNC: 5.1 MMOL/L (ref 3.7–5.3)
POTASSIUM, WHOLE BLOOD: 4.2 MMOL/L (ref 3.6–5)
POTASSIUM, WHOLE BLOOD: 5.1 MMOL/L (ref 3.6–5)
POTASSIUM, WHOLE BLOOD: 6.7 MMOL/L (ref 3.6–5)
PROCALCITONIN: 0.61 NG/ML
PROCALCITONIN: 0.73 NG/ML
PROCALCITONIN: 1.13 NG/ML
PROCALCITONIN: 1.81 NG/ML
PROCALCITONIN: 2.98 NG/ML
PROTEIN UA: NEGATIVE
PROTHROMBIN TIME: 11.6 SEC (ref 9.1–12.3)
PROTHROMBIN TIME: 16.9 SEC (ref 9.1–12.3)
PROTHROMBIN TIME: ABNORMAL SEC
PSV: ABNORMAL
PT. POSITION: ABNORMAL
RBC # BLD: 2.49 M/UL (ref 4.21–5.77)
RBC # BLD: 2.53 M/UL (ref 4.21–5.77)
RBC # BLD: 2.57 M/UL (ref 4.21–5.77)
RBC # BLD: 2.82 M/UL (ref 4.21–5.77)
RBC # BLD: 2.97 M/UL (ref 4.21–5.77)
RBC # BLD: 2.97 M/UL (ref 4.5–5.9)
RBC # BLD: 3.05 M/UL (ref 4.21–5.77)
RBC # BLD: 3.16 M/UL (ref 4.21–5.77)
RBC # BLD: 3.23 M/UL (ref 4.21–5.77)
RBC # BLD: 3.49 M/UL (ref 4.21–5.77)
RBC # BLD: 3.92 M/UL (ref 4.21–5.77)
RBC # BLD: 4.52 M/UL (ref 4.21–5.77)
RBC # BLD: 5.03 M/UL (ref 4.21–5.77)
RBC # BLD: ABNORMAL 10*6/UL
RBC # BLD: ABNORMAL M/UL
RESPIRATORY RATE: ABNORMAL
SAMPLE SITE: ABNORMAL
SAMPLE SITE: NORMAL
SARS-COV-2, RAPID: NOT DETECTED
SEDIMENTATION RATE, ERYTHROCYTE: 31 MM (ref 0–15)
SEG NEUTROPHILS: 71 % (ref 36–66)
SEG NEUTROPHILS: 78 % (ref 36–65)
SEG NEUTROPHILS: 80 % (ref 36–65)
SEG NEUTROPHILS: 80 % (ref 36–65)
SEG NEUTROPHILS: 82 % (ref 36–66)
SEG NEUTROPHILS: 83 % (ref 36–66)
SEG NEUTROPHILS: 84 % (ref 36–65)
SEG NEUTROPHILS: 85 % (ref 36–65)
SEG NEUTROPHILS: 87 % (ref 36–65)
SEG NEUTROPHILS: 87 % (ref 36–65)
SEG NEUTROPHILS: 88 % (ref 36–65)
SEG NEUTROPHILS: 88 % (ref 36–65)
SEGMENTED NEUTROPHILS ABSOLUTE COUNT: 10.12 K/UL (ref 1.5–8.1)
SEGMENTED NEUTROPHILS ABSOLUTE COUNT: 10.52 K/UL (ref 1.5–8.1)
SEGMENTED NEUTROPHILS ABSOLUTE COUNT: 10.8 K/UL (ref 1.5–8.1)
SEGMENTED NEUTROPHILS ABSOLUTE COUNT: 11.97 K/UL (ref 1.5–8.1)
SEGMENTED NEUTROPHILS ABSOLUTE COUNT: 12.41 K/UL (ref 1.5–8.1)
SEGMENTED NEUTROPHILS ABSOLUTE COUNT: 13.65 K/UL (ref 1.5–8.1)
SEGMENTED NEUTROPHILS ABSOLUTE COUNT: 13.77 K/UL (ref 1.8–7.7)
SEGMENTED NEUTROPHILS ABSOLUTE COUNT: 16.48 K/UL (ref 1.5–8.1)
SEGMENTED NEUTROPHILS ABSOLUTE COUNT: 16.73 K/UL (ref 1.8–7.7)
SEGMENTED NEUTROPHILS ABSOLUTE COUNT: 6.73 K/UL (ref 1.3–9.1)
SEGMENTED NEUTROPHILS ABSOLUTE COUNT: 9 K/UL (ref 1.5–8.1)
SEGMENTED NEUTROPHILS ABSOLUTE COUNT: 9.53 K/UL (ref 1.5–8.1)
SET RATE: ABNORMAL
SODIUM BLD-SCNC: 131 MMOL/L (ref 135–144)
SODIUM BLD-SCNC: 133 MMOL/L (ref 135–144)
SODIUM BLD-SCNC: 135 MMOL/L (ref 135–144)
SODIUM BLD-SCNC: 136 MMOL/L (ref 135–144)
SODIUM BLD-SCNC: 138 MMOL/L (ref 135–144)
SODIUM BLD-SCNC: 138 MMOL/L (ref 135–144)
SODIUM BLD-SCNC: 139 MMOL/L (ref 135–144)
SODIUM BLD-SCNC: 141 MMOL/L (ref 135–144)
SODIUM BLD-SCNC: 143 MMOL/L (ref 135–144)
SODIUM BLD-SCNC: 144 MMOL/L (ref 135–144)
SODIUM BLD-SCNC: 145 MMOL/L (ref 135–144)
SODIUM BLD-SCNC: 149 MMOL/L (ref 135–144)
SODIUM, WHOLE BLOOD: 134 MMOL/L (ref 136–145)
SODIUM, WHOLE BLOOD: 138 MMOL/L (ref 136–145)
SODIUM, WHOLE BLOOD: 138 MMOL/L (ref 136–145)
SPECIFIC GRAVITY UA: 1.05 (ref 1–1.03)
SPECIMEN DESCRIPTION: NORMAL
SURGICAL PATHOLOGY REPORT: NORMAL
SURGICAL PATHOLOGY REPORT: NORMAL
TCO2 (CALC), ART: 25 MMOL/L (ref 22–29)
TCO2 (CALC), ART: 25 MMOL/L (ref 22–29)
TCO2 (CALC), ART: 26 MMOL/L (ref 22–29)
TCO2 (CALC), ART: 26 MMOL/L (ref 22–29)
TCO2 (CALC), ART: 27 MMOL/L (ref 22–29)
TCO2 (CALC), ART: 28 MMOL/L (ref 22–29)
TCO2 (CALC), ART: 29 MMOL/L (ref 22–29)
TEXT FOR RESPIRATORY: ABNORMAL
TOTAL CO2, VENOUS: 26 MMOL/L (ref 23–30)
TOTAL HB: ABNORMAL G/DL (ref 12–16)
TOTAL PROTEIN: 5 G/DL (ref 6.4–8.3)
TOTAL PROTEIN: 7.7 G/DL (ref 6.4–8.3)
TOTAL RATE: ABNORMAL
TRANSFUSION STATUS: NORMAL
TRIGL SERPL-MCNC: 166 MG/DL
TROPONIN INTERP: NORMAL
TROPONIN T: NORMAL NG/ML
TROPONIN, HIGH SENSITIVITY: 10 NG/L (ref 0–22)
TROPONIN, HIGH SENSITIVITY: 7 NG/L (ref 0–22)
TROPONIN, HIGH SENSITIVITY: 8 NG/L (ref 0–22)
TROPONIN, HIGH SENSITIVITY: <6 NG/L (ref 0–22)
TSH SERPL DL<=0.05 MIU/L-ACNC: 1.83 MIU/L (ref 0.3–5)
TURBIDITY: CLEAR
UNIT DIVISION: 0
UNIT NUMBER: NORMAL
URINE HGB: NEGATIVE
UROBILINOGEN, URINE: NORMAL
VT: ABNORMAL
WBC # BLD: 10.7 K/UL (ref 3.5–11.3)
WBC # BLD: 10.9 K/UL (ref 3.5–11.3)
WBC # BLD: 12.7 K/UL (ref 3.5–11.3)
WBC # BLD: 13.5 K/UL (ref 3.5–11.3)
WBC # BLD: 13.5 K/UL (ref 3.5–11.3)
WBC # BLD: 13.7 K/UL (ref 3.5–11.3)
WBC # BLD: 14.1 K/UL (ref 3.5–11.3)
WBC # BLD: 15.6 K/UL (ref 3.5–11.3)
WBC # BLD: 16.6 K/UL (ref 3.5–11.3)
WBC # BLD: 19.4 K/UL (ref 3.5–11.3)
WBC # BLD: 20.4 K/UL (ref 3.5–11.3)
WBC # BLD: 6.1 K/UL (ref 3.5–11.3)
WBC # BLD: 9.5 K/UL (ref 3.5–11)
WBC # BLD: ABNORMAL 10*3/UL
WBC # BLD: ABNORMAL K/UL

## 2021-01-01 PROCEDURE — 6370000000 HC RX 637 (ALT 250 FOR IP): Performed by: STUDENT IN AN ORGANIZED HEALTH CARE EDUCATION/TRAINING PROGRAM

## 2021-01-01 PROCEDURE — 94761 N-INVAS EAR/PLS OXIMETRY MLT: CPT

## 2021-01-01 PROCEDURE — 2580000003 HC RX 258: Performed by: ANESTHESIOLOGY

## 2021-01-01 PROCEDURE — 6360000002 HC RX W HCPCS: Performed by: STUDENT IN AN ORGANIZED HEALTH CARE EDUCATION/TRAINING PROGRAM

## 2021-01-01 PROCEDURE — 84443 ASSAY THYROID STIM HORMONE: CPT

## 2021-01-01 PROCEDURE — 84145 PROCALCITONIN (PCT): CPT

## 2021-01-01 PROCEDURE — 2500000003 HC RX 250 WO HCPCS: Performed by: STUDENT IN AN ORGANIZED HEALTH CARE EDUCATION/TRAINING PROGRAM

## 2021-01-01 PROCEDURE — 36556 INSERT NON-TUNNEL CV CATH: CPT

## 2021-01-01 PROCEDURE — 2700000000 HC OXYGEN THERAPY PER DAY

## 2021-01-01 PROCEDURE — 82947 ASSAY GLUCOSE BLOOD QUANT: CPT

## 2021-01-01 PROCEDURE — 1200000000 HC SEMI PRIVATE

## 2021-01-01 PROCEDURE — 80048 BASIC METABOLIC PNL TOTAL CA: CPT

## 2021-01-01 PROCEDURE — 84100 ASSAY OF PHOSPHORUS: CPT

## 2021-01-01 PROCEDURE — 06QM0ZZ REPAIR RIGHT FEMORAL VEIN, OPEN APPROACH: ICD-10-PCS | Performed by: STUDENT IN AN ORGANIZED HEALTH CARE EDUCATION/TRAINING PROGRAM

## 2021-01-01 PROCEDURE — 87635 SARS-COV-2 COVID-19 AMP PRB: CPT

## 2021-01-01 PROCEDURE — 37799 UNLISTED PX VASCULAR SURGERY: CPT

## 2021-01-01 PROCEDURE — 2500000003 HC RX 250 WO HCPCS

## 2021-01-01 PROCEDURE — 86901 BLOOD TYPING SEROLOGIC RH(D): CPT

## 2021-01-01 PROCEDURE — 86850 RBC ANTIBODY SCREEN: CPT

## 2021-01-01 PROCEDURE — 2720000010 HC SURG SUPPLY STERILE: Performed by: SURGERY

## 2021-01-01 PROCEDURE — 93005 ELECTROCARDIOGRAM TRACING: CPT | Performed by: STUDENT IN AN ORGANIZED HEALTH CARE EDUCATION/TRAINING PROGRAM

## 2021-01-01 PROCEDURE — 88307 TISSUE EXAM BY PATHOLOGIST: CPT

## 2021-01-01 PROCEDURE — 85014 HEMATOCRIT: CPT

## 2021-01-01 PROCEDURE — 6360000002 HC RX W HCPCS

## 2021-01-01 PROCEDURE — 6370000000 HC RX 637 (ALT 250 FOR IP): Performed by: NURSE PRACTITIONER

## 2021-01-01 PROCEDURE — 5A1955Z RESPIRATORY VENTILATION, GREATER THAN 96 CONSECUTIVE HOURS: ICD-10-PCS | Performed by: STUDENT IN AN ORGANIZED HEALTH CARE EDUCATION/TRAINING PROGRAM

## 2021-01-01 PROCEDURE — 2720000010 HC SURG SUPPLY STERILE

## 2021-01-01 PROCEDURE — 71045 X-RAY EXAM CHEST 1 VIEW: CPT

## 2021-01-01 PROCEDURE — 3600000015 HC SURGERY LEVEL 5 ADDTL 15MIN: Performed by: SURGERY

## 2021-01-01 PROCEDURE — 84484 ASSAY OF TROPONIN QUANT: CPT

## 2021-01-01 PROCEDURE — 7100000000 HC PACU RECOVERY - FIRST 15 MIN: Performed by: SURGERY

## 2021-01-01 PROCEDURE — 87086 URINE CULTURE/COLONY COUNT: CPT

## 2021-01-01 PROCEDURE — 2580000003 HC RX 258: Performed by: STUDENT IN AN ORGANIZED HEALTH CARE EDUCATION/TRAINING PROGRAM

## 2021-01-01 PROCEDURE — 87040 BLOOD CULTURE FOR BACTERIA: CPT

## 2021-01-01 PROCEDURE — 85025 COMPLETE CBC W/AUTO DIFF WBC: CPT

## 2021-01-01 PROCEDURE — 83690 ASSAY OF LIPASE: CPT

## 2021-01-01 PROCEDURE — 97162 PT EVAL MOD COMPLEX 30 MIN: CPT

## 2021-01-01 PROCEDURE — 0W9930Z DRAINAGE OF RIGHT PLEURAL CAVITY WITH DRAINAGE DEVICE, PERCUTANEOUS APPROACH: ICD-10-PCS | Performed by: HEALTH CARE PROVIDER

## 2021-01-01 PROCEDURE — 6360000002 HC RX W HCPCS: Performed by: NURSE PRACTITIONER

## 2021-01-01 PROCEDURE — 6360000002 HC RX W HCPCS: Performed by: SURGERY

## 2021-01-01 PROCEDURE — 2709999900 HC NON-CHARGEABLE SUPPLY: Performed by: SURGERY

## 2021-01-01 PROCEDURE — 83735 ASSAY OF MAGNESIUM: CPT

## 2021-01-01 PROCEDURE — 2000000000 HC ICU R&B

## 2021-01-01 PROCEDURE — 36430 TRANSFUSION BLD/BLD COMPNT: CPT

## 2021-01-01 PROCEDURE — 80051 ELECTROLYTE PANEL: CPT

## 2021-01-01 PROCEDURE — 2580000003 HC RX 258: Performed by: SURGERY

## 2021-01-01 PROCEDURE — 83605 ASSAY OF LACTIC ACID: CPT

## 2021-01-01 PROCEDURE — 85652 RBC SED RATE AUTOMATED: CPT

## 2021-01-01 PROCEDURE — 2500000003 HC RX 250 WO HCPCS: Performed by: NURSE ANESTHETIST, CERTIFIED REGISTERED

## 2021-01-01 PROCEDURE — 36415 COLL VENOUS BLD VENIPUNCTURE: CPT

## 2021-01-01 PROCEDURE — 84295 ASSAY OF SERUM SODIUM: CPT

## 2021-01-01 PROCEDURE — 99285 EMERGENCY DEPT VISIT HI MDM: CPT

## 2021-01-01 PROCEDURE — 94640 AIRWAY INHALATION TREATMENT: CPT

## 2021-01-01 PROCEDURE — 82805 BLOOD GASES W/O2 SATURATION: CPT

## 2021-01-01 PROCEDURE — 82330 ASSAY OF CALCIUM: CPT

## 2021-01-01 PROCEDURE — 3700000000 HC ANESTHESIA ATTENDED CARE: Performed by: SURGERY

## 2021-01-01 PROCEDURE — 3600000013 HC SURGERY LEVEL 3 ADDTL 15MIN: Performed by: SURGERY

## 2021-01-01 PROCEDURE — 94003 VENT MGMT INPAT SUBQ DAY: CPT

## 2021-01-01 PROCEDURE — 86900 BLOOD TYPING SEROLOGIC ABO: CPT

## 2021-01-01 PROCEDURE — 7100000001 HC PACU RECOVERY - ADDTL 15 MIN: Performed by: SURGERY

## 2021-01-01 PROCEDURE — 85610 PROTHROMBIN TIME: CPT

## 2021-01-01 PROCEDURE — 93010 ELECTROCARDIOGRAM REPORT: CPT | Performed by: INTERNAL MEDICINE

## 2021-01-01 PROCEDURE — 96374 THER/PROPH/DIAG INJ IV PUSH: CPT

## 2021-01-01 PROCEDURE — 6360000002 HC RX W HCPCS: Performed by: EMERGENCY MEDICINE

## 2021-01-01 PROCEDURE — 82378 CARCINOEMBRYONIC ANTIGEN: CPT

## 2021-01-01 PROCEDURE — 82803 BLOOD GASES ANY COMBINATION: CPT

## 2021-01-01 PROCEDURE — 3600000004 HC SURGERY LEVEL 4 BASE: Performed by: SURGERY

## 2021-01-01 PROCEDURE — 84478 ASSAY OF TRIGLYCERIDES: CPT

## 2021-01-01 PROCEDURE — 6360000002 HC RX W HCPCS: Performed by: NURSE ANESTHETIST, CERTIFIED REGISTERED

## 2021-01-01 PROCEDURE — 02HV33Z INSERTION OF INFUSION DEVICE INTO SUPERIOR VENA CAVA, PERCUTANEOUS APPROACH: ICD-10-PCS | Performed by: STUDENT IN AN ORGANIZED HEALTH CARE EDUCATION/TRAINING PROGRAM

## 2021-01-01 PROCEDURE — 74018 RADEX ABDOMEN 1 VIEW: CPT

## 2021-01-01 PROCEDURE — 93005 ELECTROCARDIOGRAM TRACING: CPT | Performed by: EMERGENCY MEDICINE

## 2021-01-01 PROCEDURE — U0002 COVID-19 LAB TEST NON-CDC: HCPCS

## 2021-01-01 PROCEDURE — 85730 THROMBOPLASTIN TIME PARTIAL: CPT

## 2021-01-01 PROCEDURE — 31720 CLEARANCE OF AIRWAYS: CPT

## 2021-01-01 PROCEDURE — 85027 COMPLETE CBC AUTOMATED: CPT

## 2021-01-01 PROCEDURE — 97167 OT EVAL HIGH COMPLEX 60 MIN: CPT

## 2021-01-01 PROCEDURE — 0BH18EZ INSERTION OF ENDOTRACHEAL AIRWAY INTO TRACHEA, VIA NATURAL OR ARTIFICIAL OPENING ENDOSCOPIC: ICD-10-PCS | Performed by: STUDENT IN AN ORGANIZED HEALTH CARE EDUCATION/TRAINING PROGRAM

## 2021-01-01 PROCEDURE — P9016 RBC LEUKOCYTES REDUCED: HCPCS

## 2021-01-01 PROCEDURE — 6360000004 HC RX CONTRAST MEDICATION: Performed by: EMERGENCY MEDICINE

## 2021-01-01 PROCEDURE — 84520 ASSAY OF UREA NITROGEN: CPT

## 2021-01-01 PROCEDURE — 0W9B30Z DRAINAGE OF LEFT PLEURAL CAVITY WITH DRAINAGE DEVICE, PERCUTANEOUS APPROACH: ICD-10-PCS | Performed by: HEALTH CARE PROVIDER

## 2021-01-01 PROCEDURE — 97535 SELF CARE MNGMENT TRAINING: CPT

## 2021-01-01 PROCEDURE — G0480 DRUG TEST DEF 1-7 CLASSES: HCPCS

## 2021-01-01 PROCEDURE — G0390 TRAUMA RESPONS W/HOSP CRITI: HCPCS

## 2021-01-01 PROCEDURE — 87641 MR-STAPH DNA AMP PROBE: CPT

## 2021-01-01 PROCEDURE — 82962 GLUCOSE BLOOD TEST: CPT

## 2021-01-01 PROCEDURE — 99284 EMERGENCY DEPT VISIT MOD MDM: CPT

## 2021-01-01 PROCEDURE — 10700353 HC SURG SUPPLY STERILE

## 2021-01-01 PROCEDURE — 85018 HEMOGLOBIN: CPT

## 2021-01-01 PROCEDURE — 3600000003 HC SURGERY LEVEL 3 BASE: Performed by: SURGERY

## 2021-01-01 PROCEDURE — 93005 ELECTROCARDIOGRAM TRACING: CPT | Performed by: SURGERY

## 2021-01-01 PROCEDURE — 3700000001 HC ADD 15 MINUTES (ANESTHESIA): Performed by: SURGERY

## 2021-01-01 PROCEDURE — 6360000002 HC RX W HCPCS: Performed by: ANESTHESIOLOGY

## 2021-01-01 PROCEDURE — 80053 COMPREHEN METABOLIC PANEL: CPT

## 2021-01-01 PROCEDURE — 0DB60ZZ EXCISION OF STOMACH, OPEN APPROACH: ICD-10-PCS | Performed by: SURGERY

## 2021-01-01 PROCEDURE — 04QK0ZZ REPAIR RIGHT FEMORAL ARTERY, OPEN APPROACH: ICD-10-PCS | Performed by: STUDENT IN AN ORGANIZED HEALTH CARE EDUCATION/TRAINING PROGRAM

## 2021-01-01 PROCEDURE — 86920 COMPATIBILITY TEST SPIN: CPT

## 2021-01-01 PROCEDURE — 31500 INSERT EMERGENCY AIRWAY: CPT

## 2021-01-01 PROCEDURE — 71260 CT THORAX DX C+: CPT

## 2021-01-01 PROCEDURE — 0BH17EZ INSERTION OF ENDOTRACHEAL AIRWAY INTO TRACHEA, VIA NATURAL OR ARTIFICIAL OPENING: ICD-10-PCS | Performed by: STUDENT IN AN ORGANIZED HEALTH CARE EDUCATION/TRAINING PROGRAM

## 2021-01-01 PROCEDURE — 81003 URINALYSIS AUTO W/O SCOPE: CPT

## 2021-01-01 PROCEDURE — 99291 CRITICAL CARE FIRST HOUR: CPT

## 2021-01-01 PROCEDURE — 82435 ASSAY OF BLOOD CHLORIDE: CPT

## 2021-01-01 PROCEDURE — 2580000003 HC RX 258: Performed by: NURSE ANESTHETIST, CERTIFIED REGISTERED

## 2021-01-01 PROCEDURE — 86140 C-REACTIVE PROTEIN: CPT

## 2021-01-01 PROCEDURE — 3600000005 HC SURGERY LEVEL 5 BASE: Performed by: SURGERY

## 2021-01-01 PROCEDURE — 0DBN0ZZ EXCISION OF SIGMOID COLON, OPEN APPROACH: ICD-10-PCS | Performed by: SURGERY

## 2021-01-01 PROCEDURE — 3600000014 HC SURGERY LEVEL 4 ADDTL 15MIN: Performed by: SURGERY

## 2021-01-01 PROCEDURE — 84703 CHORIONIC GONADOTROPIN ASSAY: CPT

## 2021-01-01 PROCEDURE — 82565 ASSAY OF CREATININE: CPT

## 2021-01-01 PROCEDURE — 97530 THERAPEUTIC ACTIVITIES: CPT

## 2021-01-01 PROCEDURE — 0DTA0ZZ RESECTION OF JEJUNUM, OPEN APPROACH: ICD-10-PCS | Performed by: SURGERY

## 2021-01-01 PROCEDURE — 74177 CT ABD & PELVIS W/CONTRAST: CPT

## 2021-01-01 PROCEDURE — 0W3G0ZZ CONTROL BLEEDING IN PERITONEAL CAVITY, OPEN APPROACH: ICD-10-PCS | Performed by: SURGERY

## 2021-01-01 PROCEDURE — 84132 ASSAY OF SERUM POTASSIUM: CPT

## 2021-01-01 PROCEDURE — 85384 FIBRINOGEN ACTIVITY: CPT

## 2021-01-01 PROCEDURE — 88305 TISSUE EXAM BY PATHOLOGIST: CPT

## 2021-01-01 PROCEDURE — 94002 VENT MGMT INPAT INIT DAY: CPT

## 2021-01-01 PROCEDURE — 2060000000 HC ICU INTERMEDIATE R&B

## 2021-01-01 PROCEDURE — 6810039000 HC L1 TRAUMA ALERT

## 2021-01-01 PROCEDURE — 0D1N0Z4 BYPASS SIGMOID COLON TO CUTANEOUS, OPEN APPROACH: ICD-10-PCS | Performed by: SURGERY

## 2021-01-01 PROCEDURE — 06HY33Z INSERTION OF INFUSION DEVICE INTO LOWER VEIN, PERCUTANEOUS APPROACH: ICD-10-PCS | Performed by: STUDENT IN AN ORGANIZED HEALTH CARE EDUCATION/TRAINING PROGRAM

## 2021-01-01 PROCEDURE — 6360000004 HC RX CONTRAST MEDICATION: Performed by: STUDENT IN AN ORGANIZED HEALTH CARE EDUCATION/TRAINING PROGRAM

## 2021-01-01 PROCEDURE — 04LB0ZZ OCCLUSION OF INFERIOR MESENTERIC ARTERY, OPEN APPROACH: ICD-10-PCS | Performed by: SURGERY

## 2021-01-01 RX ORDER — FENTANYL CITRATE 50 UG/ML
INJECTION, SOLUTION INTRAMUSCULAR; INTRAVENOUS PRN
Status: DISCONTINUED | OUTPATIENT
Start: 2021-01-01 | End: 2021-01-01 | Stop reason: SDUPTHER

## 2021-01-01 RX ORDER — MAGNESIUM SULFATE IN WATER 40 MG/ML
2000 INJECTION, SOLUTION INTRAVENOUS ONCE
Status: COMPLETED | OUTPATIENT
Start: 2021-01-01 | End: 2021-01-01

## 2021-01-01 RX ORDER — FENTANYL CITRATE 50 UG/ML
25 INJECTION, SOLUTION INTRAMUSCULAR; INTRAVENOUS EVERY 5 MIN PRN
Status: DISCONTINUED | OUTPATIENT
Start: 2021-01-01 | End: 2021-01-01

## 2021-01-01 RX ORDER — GABAPENTIN 250 MG/5ML
250 SOLUTION ORAL EVERY 8 HOURS SCHEDULED
Status: DISCONTINUED | OUTPATIENT
Start: 2021-01-01 | End: 2021-01-01

## 2021-01-01 RX ORDER — SODIUM CHLORIDE 0.9 % (FLUSH) 0.9 %
10 SYRINGE (ML) INJECTION PRN
Status: DISCONTINUED | OUTPATIENT
Start: 2021-01-01 | End: 2021-01-01 | Stop reason: HOSPADM

## 2021-01-01 RX ORDER — FENTANYL CITRATE 50 UG/ML
50 INJECTION, SOLUTION INTRAMUSCULAR; INTRAVENOUS ONCE
Status: COMPLETED | OUTPATIENT
Start: 2021-01-01 | End: 2021-01-01

## 2021-01-01 RX ORDER — CALCIUM CHLORIDE 100 MG/ML
INJECTION INTRAVENOUS; INTRAVENTRICULAR PRN
Status: DISCONTINUED | OUTPATIENT
Start: 2021-01-01 | End: 2021-01-01 | Stop reason: SDUPTHER

## 2021-01-01 RX ORDER — PAROXETINE HYDROCHLORIDE 20 MG/1
20 TABLET, FILM COATED ORAL DAILY
Status: DISCONTINUED | OUTPATIENT
Start: 2021-01-01 | End: 2021-01-01 | Stop reason: HOSPADM

## 2021-01-01 RX ORDER — ONDANSETRON 2 MG/ML
4 INJECTION INTRAMUSCULAR; INTRAVENOUS EVERY 6 HOURS PRN
Status: DISCONTINUED | OUTPATIENT
Start: 2021-01-01 | End: 2021-01-01 | Stop reason: HOSPADM

## 2021-01-01 RX ORDER — SODIUM CHLORIDE, SODIUM LACTATE, POTASSIUM CHLORIDE, AND CALCIUM CHLORIDE .6; .31; .03; .02 G/100ML; G/100ML; G/100ML; G/100ML
1000 INJECTION, SOLUTION INTRAVENOUS ONCE
Status: COMPLETED | OUTPATIENT
Start: 2021-01-01 | End: 2021-01-01

## 2021-01-01 RX ORDER — DIAZEPAM 5 MG/1
10 TABLET ORAL EVERY 6 HOURS
Status: DISCONTINUED | OUTPATIENT
Start: 2021-01-01 | End: 2021-01-01

## 2021-01-01 RX ORDER — FENTANYL CITRATE 50 UG/ML
25 INJECTION, SOLUTION INTRAMUSCULAR; INTRAVENOUS ONCE
Status: COMPLETED | OUTPATIENT
Start: 2021-01-01 | End: 2021-01-01

## 2021-01-01 RX ORDER — HALOPERIDOL 5 MG/ML
5 INJECTION INTRAMUSCULAR ONCE
Status: COMPLETED | OUTPATIENT
Start: 2021-01-01 | End: 2021-01-01

## 2021-01-01 RX ORDER — ALBUTEROL SULFATE 2.5 MG/3ML
2.5 SOLUTION RESPIRATORY (INHALATION)
Status: DISCONTINUED | OUTPATIENT
Start: 2021-01-01 | End: 2021-01-01

## 2021-01-01 RX ORDER — SODIUM CHLORIDE 0.9 % (FLUSH) 0.9 %
10 SYRINGE (ML) INJECTION PRN
Status: DISCONTINUED | OUTPATIENT
Start: 2021-01-01 | End: 2021-01-01

## 2021-01-01 RX ORDER — SODIUM CHLORIDE, SODIUM LACTATE, POTASSIUM CHLORIDE, AND CALCIUM CHLORIDE .6; .31; .03; .02 G/100ML; G/100ML; G/100ML; G/100ML
500 INJECTION, SOLUTION INTRAVENOUS ONCE
Status: COMPLETED | OUTPATIENT
Start: 2021-01-01 | End: 2021-01-01

## 2021-01-01 RX ORDER — METOPROLOL TARTRATE 5 MG/5ML
5 INJECTION INTRAVENOUS ONCE
Status: COMPLETED | OUTPATIENT
Start: 2021-01-01 | End: 2021-01-01

## 2021-01-01 RX ORDER — GLYCOPYRROLATE 1 MG/5 ML
SYRINGE (ML) INTRAVENOUS PRN
Status: DISCONTINUED | OUTPATIENT
Start: 2021-01-01 | End: 2021-01-01 | Stop reason: SDUPTHER

## 2021-01-01 RX ORDER — GABAPENTIN 300 MG/1
300 CAPSULE ORAL 3 TIMES DAILY
Status: DISCONTINUED | OUTPATIENT
Start: 2021-01-01 | End: 2021-01-01

## 2021-01-01 RX ORDER — ALBUTEROL SULFATE 2.5 MG/3ML
2.5 SOLUTION RESPIRATORY (INHALATION) EVERY 6 HOURS PRN
Status: DISCONTINUED | OUTPATIENT
Start: 2021-01-01 | End: 2021-01-01 | Stop reason: HOSPADM

## 2021-01-01 RX ORDER — MIDAZOLAM HYDROCHLORIDE 2 MG/2ML
2 INJECTION, SOLUTION INTRAMUSCULAR; INTRAVENOUS ONCE
Status: COMPLETED | OUTPATIENT
Start: 2021-01-01 | End: 2021-01-01

## 2021-01-01 RX ORDER — SODIUM CHLORIDE, SODIUM LACTATE, POTASSIUM CHLORIDE, CALCIUM CHLORIDE 600; 310; 30; 20 MG/100ML; MG/100ML; MG/100ML; MG/100ML
INJECTION, SOLUTION INTRAVENOUS CONTINUOUS PRN
Status: DISCONTINUED | OUTPATIENT
Start: 2021-01-01 | End: 2021-01-01 | Stop reason: SDUPTHER

## 2021-01-01 RX ORDER — LANOLIN ALCOHOL/MO/W.PET/CERES
6 CREAM (GRAM) TOPICAL ONCE
Status: DISCONTINUED | OUTPATIENT
Start: 2021-01-01 | End: 2021-01-01

## 2021-01-01 RX ORDER — FENTANYL CITRATE 50 UG/ML
50 INJECTION, SOLUTION INTRAMUSCULAR; INTRAVENOUS
Status: DISCONTINUED | OUTPATIENT
Start: 2021-01-01 | End: 2021-01-01

## 2021-01-01 RX ORDER — DEXAMETHASONE SODIUM PHOSPHATE 4 MG/ML
INJECTION, SOLUTION INTRA-ARTICULAR; INTRALESIONAL; INTRAMUSCULAR; INTRAVENOUS; SOFT TISSUE PRN
Status: DISCONTINUED | OUTPATIENT
Start: 2021-01-01 | End: 2021-01-01 | Stop reason: SDUPTHER

## 2021-01-01 RX ORDER — FENTANYL CITRATE 50 UG/ML
25 INJECTION, SOLUTION INTRAMUSCULAR; INTRAVENOUS ONCE
Status: DISCONTINUED | OUTPATIENT
Start: 2021-01-01 | End: 2021-01-01

## 2021-01-01 RX ORDER — PROPOFOL 10 MG/ML
INJECTION, EMULSION INTRAVENOUS
Status: DISCONTINUED
Start: 2021-01-01 | End: 2021-01-01

## 2021-01-01 RX ORDER — FUROSEMIDE 10 MG/ML
40 INJECTION INTRAMUSCULAR; INTRAVENOUS ONCE
Status: COMPLETED | OUTPATIENT
Start: 2021-01-01 | End: 2021-01-01

## 2021-01-01 RX ORDER — FENTANYL CITRATE 50 UG/ML
25 INJECTION, SOLUTION INTRAMUSCULAR; INTRAVENOUS
Status: DISCONTINUED | OUTPATIENT
Start: 2021-01-01 | End: 2021-01-01

## 2021-01-01 RX ORDER — ONDANSETRON 2 MG/ML
INJECTION INTRAMUSCULAR; INTRAVENOUS PRN
Status: DISCONTINUED | OUTPATIENT
Start: 2021-01-01 | End: 2021-01-01 | Stop reason: SDUPTHER

## 2021-01-01 RX ORDER — PROPOFOL 10 MG/ML
10 INJECTION, EMULSION INTRAVENOUS
Status: DISCONTINUED | OUTPATIENT
Start: 2021-01-01 | End: 2021-01-01

## 2021-01-01 RX ORDER — HALOPERIDOL 5 MG/ML
INJECTION INTRAMUSCULAR
Status: DISPENSED
Start: 2021-01-01 | End: 2021-01-01

## 2021-01-01 RX ORDER — LIDOCAINE HYDROCHLORIDE 10 MG/ML
INJECTION, SOLUTION EPIDURAL; INFILTRATION; INTRACAUDAL; PERINEURAL PRN
Status: DISCONTINUED | OUTPATIENT
Start: 2021-01-01 | End: 2021-01-01 | Stop reason: SDUPTHER

## 2021-01-01 RX ORDER — HALOPERIDOL 5 MG/ML
INJECTION INTRAMUSCULAR
Status: COMPLETED
Start: 2021-01-01 | End: 2021-01-01

## 2021-01-01 RX ORDER — PROPOFOL 10 MG/ML
INJECTION, EMULSION INTRAVENOUS PRN
Status: DISCONTINUED | OUTPATIENT
Start: 2021-01-01 | End: 2021-01-01 | Stop reason: SDUPTHER

## 2021-01-01 RX ORDER — SODIUM CHLORIDE, SODIUM LACTATE, POTASSIUM CHLORIDE, CALCIUM CHLORIDE 600; 310; 30; 20 MG/100ML; MG/100ML; MG/100ML; MG/100ML
INJECTION, SOLUTION INTRAVENOUS CONTINUOUS
Status: DISCONTINUED | OUTPATIENT
Start: 2021-01-01 | End: 2021-01-01

## 2021-01-01 RX ORDER — FENTANYL CITRATE 50 UG/ML
INJECTION, SOLUTION INTRAMUSCULAR; INTRAVENOUS
Status: DISPENSED
Start: 2021-01-01 | End: 2021-01-01

## 2021-01-01 RX ORDER — PHENYLEPHRINE HCL IN 0.9% NACL 1 MG/10 ML
SYRINGE (ML) INTRAVENOUS PRN
Status: DISCONTINUED | OUTPATIENT
Start: 2021-01-01 | End: 2021-01-01 | Stop reason: SDUPTHER

## 2021-01-01 RX ORDER — DIAZEPAM 5 MG/1
5 TABLET ORAL EVERY 6 HOURS
Status: DISCONTINUED | OUTPATIENT
Start: 2021-01-01 | End: 2021-01-01

## 2021-01-01 RX ORDER — LIDOCAINE HYDROCHLORIDE 40 MG/ML
4 INJECTION, SOLUTION RETROBULBAR; TOPICAL
Status: DISCONTINUED | OUTPATIENT
Start: 2021-01-01 | End: 2021-01-01

## 2021-01-01 RX ORDER — HALOPERIDOL 5 MG/ML
5 INJECTION INTRAMUSCULAR EVERY 6 HOURS PRN
Status: DISCONTINUED | OUTPATIENT
Start: 2021-01-01 | End: 2021-01-01

## 2021-01-01 RX ORDER — NALOXONE HYDROCHLORIDE 0.4 MG/ML
0.4 INJECTION, SOLUTION INTRAMUSCULAR; INTRAVENOUS; SUBCUTANEOUS PRN
Status: DISCONTINUED | OUTPATIENT
Start: 2021-01-01 | End: 2021-01-01

## 2021-01-01 RX ORDER — MIDAZOLAM HYDROCHLORIDE 1 MG/ML
INJECTION INTRAMUSCULAR; INTRAVENOUS PRN
Status: DISCONTINUED | OUTPATIENT
Start: 2021-01-01 | End: 2021-01-01 | Stop reason: SDUPTHER

## 2021-01-01 RX ORDER — FOLIC ACID 1 MG/1
1 TABLET ORAL DAILY
Status: DISCONTINUED | OUTPATIENT
Start: 2021-01-01 | End: 2021-01-01 | Stop reason: HOSPADM

## 2021-01-01 RX ORDER — EPINEPHRINE 1 MG/ML(1)
AMPUL (ML) INJECTION PRN
Status: DISCONTINUED | OUTPATIENT
Start: 2021-01-01 | End: 2021-01-01 | Stop reason: SDUPTHER

## 2021-01-01 RX ORDER — LORAZEPAM 2 MG/ML
1 INJECTION INTRAMUSCULAR ONCE
Status: COMPLETED | OUTPATIENT
Start: 2021-01-01 | End: 2021-01-01

## 2021-01-01 RX ORDER — LANOLIN ALCOHOL/MO/W.PET/CERES
250 CREAM (GRAM) TOPICAL DAILY
Status: DISCONTINUED | OUTPATIENT
Start: 2021-01-01 | End: 2021-01-01 | Stop reason: HOSPADM

## 2021-01-01 RX ORDER — ONDANSETRON 2 MG/ML
4 INJECTION INTRAMUSCULAR; INTRAVENOUS ONCE
Status: DISCONTINUED | OUTPATIENT
Start: 2021-01-01 | End: 2021-01-01

## 2021-01-01 RX ORDER — OXYCODONE HYDROCHLORIDE 5 MG/1
5 TABLET ORAL EVERY 8 HOURS PRN
Status: DISCONTINUED | OUTPATIENT
Start: 2021-01-01 | End: 2021-01-01 | Stop reason: HOSPADM

## 2021-01-01 RX ORDER — DEXMEDETOMIDINE HYDROCHLORIDE 4 UG/ML
.2-1.4 INJECTION, SOLUTION INTRAVENOUS CONTINUOUS
Status: DISCONTINUED | OUTPATIENT
Start: 2021-01-01 | End: 2021-01-01

## 2021-01-01 RX ORDER — FENTANYL CITRATE 50 UG/ML
50 INJECTION, SOLUTION INTRAMUSCULAR; INTRAVENOUS ONCE
Status: DISCONTINUED | OUTPATIENT
Start: 2021-01-01 | End: 2021-01-01

## 2021-01-01 RX ORDER — DEXTROSE AND SODIUM CHLORIDE 5; .45 G/100ML; G/100ML
INJECTION, SOLUTION INTRAVENOUS CONTINUOUS
Status: DISCONTINUED | OUTPATIENT
Start: 2021-01-01 | End: 2021-01-01

## 2021-01-01 RX ORDER — IBUPROFEN 400 MG/1
400 TABLET ORAL EVERY 4 HOURS PRN
Status: DISCONTINUED | OUTPATIENT
Start: 2021-01-01 | End: 2021-01-01

## 2021-01-01 RX ORDER — NOREPINEPHRINE BIT/0.9 % NACL 16MG/250ML
2-100 INFUSION BOTTLE (ML) INTRAVENOUS CONTINUOUS
Status: DISCONTINUED | OUTPATIENT
Start: 2021-01-01 | End: 2021-01-01

## 2021-01-01 RX ORDER — NEOSTIGMINE METHYLSULFATE 5 MG/5 ML
SYRINGE (ML) INTRAVENOUS PRN
Status: DISCONTINUED | OUTPATIENT
Start: 2021-01-01 | End: 2021-01-01 | Stop reason: SDUPTHER

## 2021-01-01 RX ORDER — FAMOTIDINE 20 MG/1
20 TABLET, FILM COATED ORAL 2 TIMES DAILY
Status: DISCONTINUED | OUTPATIENT
Start: 2021-01-01 | End: 2021-01-01

## 2021-01-01 RX ORDER — FENTANYL CITRATE 50 UG/ML
100 INJECTION, SOLUTION INTRAMUSCULAR; INTRAVENOUS ONCE
Status: COMPLETED | OUTPATIENT
Start: 2021-01-01 | End: 2021-01-01

## 2021-01-01 RX ORDER — MAGNESIUM HYDROXIDE 1200 MG/15ML
LIQUID ORAL CONTINUOUS PRN
Status: COMPLETED | OUTPATIENT
Start: 2021-01-01 | End: 2021-01-01

## 2021-01-01 RX ORDER — MEPERIDINE HYDROCHLORIDE 50 MG/ML
12.5 INJECTION INTRAMUSCULAR; INTRAVENOUS; SUBCUTANEOUS EVERY 5 MIN PRN
Status: DISCONTINUED | OUTPATIENT
Start: 2021-01-01 | End: 2021-01-01

## 2021-01-01 RX ORDER — QUETIAPINE FUMARATE 25 MG/1
75 TABLET, FILM COATED ORAL 2 TIMES DAILY
Status: DISCONTINUED | OUTPATIENT
Start: 2021-01-01 | End: 2021-01-01 | Stop reason: HOSPADM

## 2021-01-01 RX ORDER — POTASSIUM CHLORIDE 7.45 MG/ML
10 INJECTION INTRAVENOUS
Status: DISCONTINUED | OUTPATIENT
Start: 2021-01-01 | End: 2021-01-01

## 2021-01-01 RX ORDER — MIDAZOLAM HYDROCHLORIDE 2 MG/2ML
2 INJECTION, SOLUTION INTRAMUSCULAR; INTRAVENOUS
Status: DISCONTINUED | OUTPATIENT
Start: 2021-01-01 | End: 2021-01-01

## 2021-01-01 RX ORDER — FUROSEMIDE 10 MG/ML
20 INJECTION INTRAMUSCULAR; INTRAVENOUS ONCE
Status: COMPLETED | OUTPATIENT
Start: 2021-01-01 | End: 2021-01-01

## 2021-01-01 RX ORDER — IPRATROPIUM BROMIDE AND ALBUTEROL SULFATE 2.5; .5 MG/3ML; MG/3ML
1 SOLUTION RESPIRATORY (INHALATION) 4 TIMES DAILY
Status: DISCONTINUED | OUTPATIENT
Start: 2021-01-01 | End: 2021-01-01

## 2021-01-01 RX ORDER — SODIUM CHLORIDE 9 MG/ML
INJECTION, SOLUTION INTRAVENOUS CONTINUOUS
Status: DISCONTINUED | OUTPATIENT
Start: 2021-01-01 | End: 2021-01-01

## 2021-01-01 RX ORDER — FENTANYL CITRATE 50 UG/ML
INJECTION, SOLUTION INTRAMUSCULAR; INTRAVENOUS
Status: DISCONTINUED
Start: 2021-01-01 | End: 2021-01-01

## 2021-01-01 RX ORDER — 0.9 % SODIUM CHLORIDE 0.9 %
500 INTRAVENOUS SOLUTION INTRAVENOUS
Status: DISCONTINUED | OUTPATIENT
Start: 2021-01-01 | End: 2021-01-01

## 2021-01-01 RX ORDER — SODIUM CHLORIDE 9 MG/ML
5 INJECTION INTRAVENOUS DAILY
Status: DISCONTINUED | OUTPATIENT
Start: 2021-01-01 | End: 2021-01-01

## 2021-01-01 RX ORDER — OXYCODONE HYDROCHLORIDE 5 MG/1
5 TABLET ORAL EVERY 6 HOURS PRN
Status: DISCONTINUED | OUTPATIENT
Start: 2021-01-01 | End: 2021-01-01

## 2021-01-01 RX ORDER — METOCLOPRAMIDE HYDROCHLORIDE 5 MG/ML
10 INJECTION INTRAMUSCULAR; INTRAVENOUS EVERY 6 HOURS SCHEDULED
Status: DISPENSED | OUTPATIENT
Start: 2021-01-01 | End: 2021-01-01

## 2021-01-01 RX ORDER — FENTANYL CITRATE 50 UG/ML
50 INJECTION, SOLUTION INTRAMUSCULAR; INTRAVENOUS EVERY 5 MIN PRN
Status: DISCONTINUED | OUTPATIENT
Start: 2021-01-01 | End: 2021-01-01 | Stop reason: HOSPADM

## 2021-01-01 RX ORDER — SODIUM CHLORIDE 0.9 % (FLUSH) 0.9 %
10 SYRINGE (ML) INJECTION EVERY 12 HOURS SCHEDULED
Status: DISCONTINUED | OUTPATIENT
Start: 2021-01-01 | End: 2021-01-01

## 2021-01-01 RX ORDER — ACETAMINOPHEN 325 MG/1
650 TABLET ORAL ONCE
Status: DISCONTINUED | OUTPATIENT
Start: 2021-01-01 | End: 2021-01-01

## 2021-01-01 RX ORDER — SODIUM CHLORIDE, SODIUM LACTATE, POTASSIUM CHLORIDE, CALCIUM CHLORIDE 600; 310; 30; 20 MG/100ML; MG/100ML; MG/100ML; MG/100ML
1000 INJECTION, SOLUTION INTRAVENOUS ONCE
Status: COMPLETED | OUTPATIENT
Start: 2021-01-01 | End: 2021-01-01

## 2021-01-01 RX ORDER — ROCURONIUM BROMIDE 10 MG/ML
INJECTION, SOLUTION INTRAVENOUS PRN
Status: DISCONTINUED | OUTPATIENT
Start: 2021-01-01 | End: 2021-01-01 | Stop reason: SDUPTHER

## 2021-01-01 RX ORDER — SODIUM CHLORIDE 0.9 % (FLUSH) 0.9 %
10 SYRINGE (ML) INJECTION EVERY 12 HOURS SCHEDULED
Status: DISCONTINUED | OUTPATIENT
Start: 2021-01-01 | End: 2021-01-01 | Stop reason: HOSPADM

## 2021-01-01 RX ORDER — SODIUM CHLORIDE 9 MG/ML
INJECTION, SOLUTION INTRAVENOUS PRN
Status: DISCONTINUED | OUTPATIENT
Start: 2021-01-01 | End: 2021-01-01

## 2021-01-01 RX ORDER — KETOROLAC TROMETHAMINE 30 MG/ML
15 INJECTION, SOLUTION INTRAMUSCULAR; INTRAVENOUS EVERY 6 HOURS
Status: COMPLETED | OUTPATIENT
Start: 2021-01-01 | End: 2021-01-01

## 2021-01-01 RX ORDER — SODIUM CHLORIDE 0.9 % (FLUSH) 0.9 %
5-40 SYRINGE (ML) INJECTION EVERY 12 HOURS SCHEDULED
Status: DISCONTINUED | OUTPATIENT
Start: 2021-01-01 | End: 2021-01-01 | Stop reason: HOSPADM

## 2021-01-01 RX ORDER — IBUPROFEN 400 MG/1
400 TABLET ORAL
Status: DISCONTINUED | OUTPATIENT
Start: 2021-01-01 | End: 2021-01-01

## 2021-01-01 RX ORDER — DIAZEPAM 5 MG/1
10 TABLET ORAL EVERY 6 HOURS PRN
Status: DISCONTINUED | OUTPATIENT
Start: 2021-01-01 | End: 2021-01-01

## 2021-01-01 RX ORDER — LORAZEPAM 2 MG/ML
1 INJECTION INTRAMUSCULAR EVERY 4 HOURS PRN
Status: DISCONTINUED | OUTPATIENT
Start: 2021-01-01 | End: 2021-01-01

## 2021-01-01 RX ORDER — SODIUM CHLORIDE 0.9 % (FLUSH) 0.9 %
5-40 SYRINGE (ML) INJECTION PRN
Status: DISCONTINUED | OUTPATIENT
Start: 2021-01-01 | End: 2021-01-01 | Stop reason: HOSPADM

## 2021-01-01 RX ORDER — MIDAZOLAM HYDROCHLORIDE 1 MG/ML
INJECTION INTRAMUSCULAR; INTRAVENOUS
Status: DISPENSED
Start: 2021-01-01 | End: 2021-01-01

## 2021-01-01 RX ORDER — POTASSIUM CHLORIDE 29.8 MG/ML
20 INJECTION INTRAVENOUS ONCE
Status: COMPLETED | OUTPATIENT
Start: 2021-01-01 | End: 2021-01-01

## 2021-01-01 RX ORDER — METHOCARBAMOL 750 MG/1
750 TABLET, FILM COATED ORAL EVERY 6 HOURS
Status: DISCONTINUED | OUTPATIENT
Start: 2021-01-01 | End: 2021-01-01 | Stop reason: HOSPADM

## 2021-01-01 RX ORDER — MAGNESIUM SULFATE 1 G/100ML
1000 INJECTION INTRAVENOUS ONCE
Status: COMPLETED | OUTPATIENT
Start: 2021-01-01 | End: 2021-01-01

## 2021-01-01 RX ORDER — QUETIAPINE FUMARATE 25 MG/1
50 TABLET, FILM COATED ORAL 2 TIMES DAILY
Status: DISCONTINUED | OUTPATIENT
Start: 2021-01-01 | End: 2021-01-01

## 2021-01-01 RX ORDER — OXYCODONE HYDROCHLORIDE 5 MG/1
5 TABLET ORAL ONCE
Status: DISCONTINUED | OUTPATIENT
Start: 2021-01-01 | End: 2021-01-01 | Stop reason: HOSPADM

## 2021-01-01 RX ORDER — ONDANSETRON 4 MG/1
4 TABLET, ORALLY DISINTEGRATING ORAL EVERY 8 HOURS PRN
Status: DISCONTINUED | OUTPATIENT
Start: 2021-01-01 | End: 2021-01-01 | Stop reason: HOSPADM

## 2021-01-01 RX ORDER — CEFAZOLIN SODIUM 1 G/3ML
INJECTION, POWDER, FOR SOLUTION INTRAMUSCULAR; INTRAVENOUS PRN
Status: DISCONTINUED | OUTPATIENT
Start: 2021-01-01 | End: 2021-01-01 | Stop reason: SDUPTHER

## 2021-01-01 RX ORDER — GABAPENTIN 250 MG/5ML
300 SOLUTION ORAL EVERY 8 HOURS SCHEDULED
Status: DISCONTINUED | OUTPATIENT
Start: 2021-01-01 | End: 2021-01-01 | Stop reason: HOSPADM

## 2021-01-01 RX ORDER — ACETYLCYSTEINE 200 MG/ML
600 SOLUTION ORAL; RESPIRATORY (INHALATION)
Status: DISCONTINUED | OUTPATIENT
Start: 2021-01-01 | End: 2021-01-01

## 2021-01-01 RX ORDER — DIPHENHYDRAMINE HYDROCHLORIDE 50 MG/ML
12.5 INJECTION INTRAMUSCULAR; INTRAVENOUS
Status: DISCONTINUED | OUTPATIENT
Start: 2021-01-01 | End: 2021-01-01

## 2021-01-01 RX ORDER — PROMETHAZINE HYDROCHLORIDE 25 MG/ML
6.25 INJECTION, SOLUTION INTRAMUSCULAR; INTRAVENOUS
Status: DISCONTINUED | OUTPATIENT
Start: 2021-01-01 | End: 2021-01-01

## 2021-01-01 RX ORDER — LEVOTHYROXINE SODIUM 0.07 MG/1
75 TABLET ORAL DAILY
Status: DISCONTINUED | OUTPATIENT
Start: 2021-01-01 | End: 2021-01-01 | Stop reason: HOSPADM

## 2021-01-01 RX ORDER — ONDANSETRON 2 MG/ML
4 INJECTION INTRAMUSCULAR; INTRAVENOUS
Status: DISCONTINUED | OUTPATIENT
Start: 2021-01-01 | End: 2021-01-01 | Stop reason: HOSPADM

## 2021-01-01 RX ORDER — SODIUM CHLORIDE 9 MG/ML
INJECTION, SOLUTION INTRAVENOUS CONTINUOUS PRN
Status: DISCONTINUED | OUTPATIENT
Start: 2021-01-01 | End: 2021-01-01 | Stop reason: SDUPTHER

## 2021-01-01 RX ORDER — IPRATROPIUM BROMIDE AND ALBUTEROL SULFATE 2.5; .5 MG/3ML; MG/3ML
1 SOLUTION RESPIRATORY (INHALATION) EVERY 4 HOURS
Status: DISCONTINUED | OUTPATIENT
Start: 2021-01-01 | End: 2021-01-01

## 2021-01-01 RX ORDER — SODIUM CHLORIDE 9 MG/ML
25 INJECTION, SOLUTION INTRAVENOUS PRN
Status: DISCONTINUED | OUTPATIENT
Start: 2021-01-01 | End: 2021-01-01 | Stop reason: HOSPADM

## 2021-01-01 RX ORDER — LEVOTHYROXINE SODIUM ANHYDROUS 100 UG/5ML
37.5 INJECTION, POWDER, LYOPHILIZED, FOR SOLUTION INTRAVENOUS DAILY
Status: DISCONTINUED | OUTPATIENT
Start: 2021-01-01 | End: 2021-01-01

## 2021-01-01 RX ORDER — ACETAMINOPHEN 160 MG/5ML
1000 SOLUTION ORAL EVERY 8 HOURS
Status: DISCONTINUED | OUTPATIENT
Start: 2021-01-01 | End: 2021-01-01 | Stop reason: HOSPADM

## 2021-01-01 RX ORDER — FUROSEMIDE 10 MG/ML
80 INJECTION INTRAMUSCULAR; INTRAVENOUS ONCE
Status: COMPLETED | OUTPATIENT
Start: 2021-01-01 | End: 2021-01-01

## 2021-01-01 RX ORDER — METOCLOPRAMIDE HYDROCHLORIDE 5 MG/ML
10 INJECTION INTRAMUSCULAR; INTRAVENOUS EVERY 6 HOURS
Status: DISCONTINUED | OUTPATIENT
Start: 2021-01-01 | End: 2021-01-01

## 2021-01-01 RX ORDER — 0.9 % SODIUM CHLORIDE 0.9 %
1000 INTRAVENOUS SOLUTION INTRAVENOUS ONCE
Status: COMPLETED | OUTPATIENT
Start: 2021-01-01 | End: 2021-01-01

## 2021-01-01 RX ORDER — METOPROLOL TARTRATE 5 MG/5ML
INJECTION INTRAVENOUS
Status: COMPLETED
Start: 2021-01-01 | End: 2021-01-01

## 2021-01-01 RX ORDER — OXYCODONE HYDROCHLORIDE 5 MG/1
5 TABLET ORAL ONCE
Status: COMPLETED | OUTPATIENT
Start: 2021-01-01 | End: 2021-01-01

## 2021-01-01 RX ADMIN — FENTANYL CITRATE 150 MCG: 50 INJECTION, SOLUTION INTRAMUSCULAR; INTRAVENOUS at 10:21

## 2021-01-01 RX ADMIN — DEXMEDETOMIDINE HYDROCHLORIDE 0.7 MCG/KG/HR: 400 INJECTION INTRAVENOUS at 13:32

## 2021-01-01 RX ADMIN — DEXTROSE AND SODIUM CHLORIDE: 5; 450 INJECTION, SOLUTION INTRAVENOUS at 21:15

## 2021-01-01 RX ADMIN — ACETAMINOPHEN 1000 MG: 650 SOLUTION ORAL at 06:21

## 2021-01-01 RX ADMIN — DEXMEDETOMIDINE HYDROCHLORIDE 1.3 MCG/KG/HR: 400 INJECTION INTRAVENOUS at 02:00

## 2021-01-01 RX ADMIN — FUROSEMIDE 40 MG: 10 INJECTION, SOLUTION INTRAMUSCULAR; INTRAVENOUS at 09:51

## 2021-01-01 RX ADMIN — FOLIC ACID 1 MG: 1 TABLET ORAL at 08:02

## 2021-01-01 RX ADMIN — LORAZEPAM 1 MG: 2 INJECTION INTRAMUSCULAR at 04:58

## 2021-01-01 RX ADMIN — FENTANYL CITRATE 100 MCG: 50 INJECTION, SOLUTION INTRAMUSCULAR; INTRAVENOUS at 13:22

## 2021-01-01 RX ADMIN — DEXMEDETOMIDINE HYDROCHLORIDE 1.3 MCG/KG/HR: 400 INJECTION INTRAVENOUS at 12:19

## 2021-01-01 RX ADMIN — GABAPENTIN 300 MG: 250 SUSPENSION ORAL at 22:04

## 2021-01-01 RX ADMIN — PIPERACILLIN AND TAZOBACTAM 4500 MG: 4; .5 INJECTION, POWDER, LYOPHILIZED, FOR SOLUTION INTRAVENOUS; PARENTERAL at 08:15

## 2021-01-01 RX ADMIN — GABAPENTIN 300 MG: 250 SUSPENSION ORAL at 06:35

## 2021-01-01 RX ADMIN — ACETAMINOPHEN 1000 MG: 650 SOLUTION ORAL at 12:18

## 2021-01-01 RX ADMIN — GABAPENTIN 300 MG: 250 SUSPENSION ORAL at 13:26

## 2021-01-01 RX ADMIN — METOPROLOL TARTRATE 5 MG: 1 INJECTION, SOLUTION INTRAVENOUS at 14:52

## 2021-01-01 RX ADMIN — MAGNESIUM SULFATE 2000 MG: 2 INJECTION INTRAVENOUS at 06:44

## 2021-01-01 RX ADMIN — ACETAMINOPHEN 1000 MG: 650 SOLUTION ORAL at 12:44

## 2021-01-01 RX ADMIN — POTASSIUM BICARBONATE 40 MEQ: 782 TABLET, EFFERVESCENT ORAL at 08:31

## 2021-01-01 RX ADMIN — SODIUM CHLORIDE, POTASSIUM CHLORIDE, SODIUM LACTATE AND CALCIUM CHLORIDE: 600; 310; 30; 20 INJECTION, SOLUTION INTRAVENOUS at 10:19

## 2021-01-01 RX ADMIN — IBUPROFEN 400 MG: 400 TABLET, FILM COATED ORAL at 20:00

## 2021-01-01 RX ADMIN — Medication 100 MCG/HR: at 14:33

## 2021-01-01 RX ADMIN — HYDROMORPHONE HYDROCHLORIDE 0.5 MG: 1 INJECTION, SOLUTION INTRAMUSCULAR; INTRAVENOUS; SUBCUTANEOUS at 12:50

## 2021-01-01 RX ADMIN — ROCURONIUM BROMIDE 10 MG: 10 INJECTION INTRAVENOUS at 10:35

## 2021-01-01 RX ADMIN — FUROSEMIDE 80 MG: 10 INJECTION, SOLUTION INTRAMUSCULAR; INTRAVENOUS at 17:14

## 2021-01-01 RX ADMIN — DIAZEPAM 10 MG: 5 TABLET ORAL at 12:46

## 2021-01-01 RX ADMIN — SODIUM CHLORIDE, PRESERVATIVE FREE 10 ML: 5 INJECTION INTRAVENOUS at 21:08

## 2021-01-01 RX ADMIN — OXYCODONE HYDROCHLORIDE 5 MG: 5 TABLET ORAL at 17:54

## 2021-01-01 RX ADMIN — FENTANYL CITRATE 100 MCG: 50 INJECTION, SOLUTION INTRAMUSCULAR; INTRAVENOUS at 09:07

## 2021-01-01 RX ADMIN — QUETIAPINE FUMARATE 75 MG: 25 TABLET ORAL at 08:43

## 2021-01-01 RX ADMIN — SODIUM CHLORIDE, POTASSIUM CHLORIDE, SODIUM LACTATE AND CALCIUM CHLORIDE: 600; 310; 30; 20 INJECTION, SOLUTION INTRAVENOUS at 12:29

## 2021-01-01 RX ADMIN — VASOPRESSIN 0.04 UNITS/MIN: 20 INJECTION INTRAVENOUS at 20:25

## 2021-01-01 RX ADMIN — FUROSEMIDE 20 MG: 10 INJECTION, SOLUTION INTRAMUSCULAR; INTRAVENOUS at 09:01

## 2021-01-01 RX ADMIN — FENTANYL CITRATE 50 MCG: 50 INJECTION, SOLUTION INTRAMUSCULAR; INTRAVENOUS at 08:06

## 2021-01-01 RX ADMIN — GABAPENTIN 300 MG: 250 SUSPENSION ORAL at 06:54

## 2021-01-01 RX ADMIN — Medication 300 MCG: at 02:13

## 2021-01-01 RX ADMIN — GABAPENTIN 300 MG: 250 SUSPENSION ORAL at 15:29

## 2021-01-01 RX ADMIN — POTASSIUM CHLORIDE 20 MEQ: 29.8 INJECTION, SOLUTION INTRAVENOUS at 06:57

## 2021-01-01 RX ADMIN — DIBASIC SODIUM PHOSPHATE, MONOBASIC POTASSIUM PHOSPHATE AND MONOBASIC SODIUM PHOSPHATE 2 TABLET: 852; 155; 130 TABLET ORAL at 09:55

## 2021-01-01 RX ADMIN — DIAZEPAM 10 MG: 5 TABLET ORAL at 18:02

## 2021-01-01 RX ADMIN — THIAMINE HYDROCHLORIDE 500 MG: 100 INJECTION, SOLUTION INTRAMUSCULAR; INTRAVENOUS at 21:15

## 2021-01-01 RX ADMIN — FOLIC ACID 1 MG: 1 TABLET ORAL at 08:42

## 2021-01-01 RX ADMIN — METHOCARBAMOL TABLETS 750 MG: 750 TABLET, COATED ORAL at 21:29

## 2021-01-01 RX ADMIN — OXYCODONE HYDROCHLORIDE 5 MG: 5 TABLET ORAL at 01:42

## 2021-01-01 RX ADMIN — LORAZEPAM 1 MG: 2 INJECTION INTRAMUSCULAR at 23:45

## 2021-01-01 RX ADMIN — DIAZEPAM 10 MG: 5 TABLET ORAL at 05:01

## 2021-01-01 RX ADMIN — METOPROLOL TARTRATE 5 MG: 5 INJECTION INTRAVENOUS at 14:52

## 2021-01-01 RX ADMIN — KETOROLAC TROMETHAMINE 15 MG: 30 INJECTION, SOLUTION INTRAMUSCULAR; INTRAVENOUS at 11:46

## 2021-01-01 RX ADMIN — NALOXEGOL OXALATE 25 MG: 12.5 TABLET, FILM COATED ORAL at 08:13

## 2021-01-01 RX ADMIN — KETOROLAC TROMETHAMINE 15 MG: 30 INJECTION, SOLUTION INTRAMUSCULAR; INTRAVENOUS at 04:06

## 2021-01-01 RX ADMIN — MAGNESIUM SULFATE 2000 MG: 2 INJECTION INTRAVENOUS at 08:02

## 2021-01-01 RX ADMIN — Medication 0.4 MG: at 12:11

## 2021-01-01 RX ADMIN — PIPERACILLIN AND TAZOBACTAM 4500 MG: 4; .5 INJECTION, POWDER, LYOPHILIZED, FOR SOLUTION INTRAVENOUS; PARENTERAL at 02:20

## 2021-01-01 RX ADMIN — METHOCARBAMOL TABLETS 750 MG: 750 TABLET, COATED ORAL at 16:39

## 2021-01-01 RX ADMIN — Medication: at 05:55

## 2021-01-01 RX ADMIN — SODIUM CHLORIDE, PRESERVATIVE FREE 10 ML: 5 INJECTION INTRAVENOUS at 19:52

## 2021-01-01 RX ADMIN — METOCLOPRAMIDE 10 MG: 5 INJECTION, SOLUTION INTRAMUSCULAR; INTRAVENOUS at 05:22

## 2021-01-01 RX ADMIN — IPRATROPIUM BROMIDE AND ALBUTEROL SULFATE 1 AMPULE: .5; 3 SOLUTION RESPIRATORY (INHALATION) at 07:32

## 2021-01-01 RX ADMIN — IBUPROFEN 400 MG: 400 TABLET, FILM COATED ORAL at 13:48

## 2021-01-01 RX ADMIN — VASOPRESSIN 0.04 UNITS/MIN: 20 INJECTION INTRAVENOUS at 18:33

## 2021-01-01 RX ADMIN — PIPERACILLIN AND TAZOBACTAM 4500 MG: 4; .5 INJECTION, POWDER, LYOPHILIZED, FOR SOLUTION INTRAVENOUS; PARENTERAL at 17:14

## 2021-01-01 RX ADMIN — DEXMEDETOMIDINE HYDROCHLORIDE 1.4 MCG/KG/HR: 400 INJECTION INTRAVENOUS at 05:12

## 2021-01-01 RX ADMIN — ONDANSETRON 4 MG: 4 TABLET, ORALLY DISINTEGRATING ORAL at 19:59

## 2021-01-01 RX ADMIN — DEXMEDETOMIDINE HYDROCHLORIDE 1.3 MCG/KG/HR: 400 INJECTION INTRAVENOUS at 08:16

## 2021-01-01 RX ADMIN — SODIUM CHLORIDE: 9 INJECTION, SOLUTION INTRAVENOUS at 12:29

## 2021-01-01 RX ADMIN — DEXTROSE AND SODIUM CHLORIDE: 5; 450 INJECTION, SOLUTION INTRAVENOUS at 18:52

## 2021-01-01 RX ADMIN — METOCLOPRAMIDE 10 MG: 5 INJECTION, SOLUTION INTRAMUSCULAR; INTRAVENOUS at 00:01

## 2021-01-01 RX ADMIN — DEXMEDETOMIDINE HYDROCHLORIDE 1.2 MCG/KG/HR: 400 INJECTION INTRAVENOUS at 20:00

## 2021-01-01 RX ADMIN — FOLIC ACID 1 MG: 1 TABLET ORAL at 13:27

## 2021-01-01 RX ADMIN — METOCLOPRAMIDE 10 MG: 5 INJECTION, SOLUTION INTRAMUSCULAR; INTRAVENOUS at 16:59

## 2021-01-01 RX ADMIN — PAROXETINE HYDROCHLORIDE HEMIHYDRATE 20 MG: 20 TABLET, FILM COATED ORAL at 08:15

## 2021-01-01 RX ADMIN — Medication 0.6 MG: at 13:16

## 2021-01-01 RX ADMIN — OXYCODONE HYDROCHLORIDE 5 MG: 5 TABLET ORAL at 19:53

## 2021-01-01 RX ADMIN — HYDROMORPHONE HYDROCHLORIDE 0.5 MG: 1 INJECTION, SOLUTION INTRAMUSCULAR; INTRAVENOUS; SUBCUTANEOUS at 14:40

## 2021-01-01 RX ADMIN — GABAPENTIN 300 MG: 250 SUSPENSION ORAL at 06:21

## 2021-01-01 RX ADMIN — SODIUM CHLORIDE, POTASSIUM CHLORIDE, SODIUM LACTATE AND CALCIUM CHLORIDE 500 ML: 600; 310; 30; 20 INJECTION, SOLUTION INTRAVENOUS at 02:33

## 2021-01-01 RX ADMIN — FAMOTIDINE 20 MG: 20 TABLET, FILM COATED ORAL at 20:11

## 2021-01-01 RX ADMIN — NALOXEGOL OXALATE 25 MG: 12.5 TABLET, FILM COATED ORAL at 11:10

## 2021-01-01 RX ADMIN — DEXMEDETOMIDINE HYDROCHLORIDE 1.1 MCG/KG/HR: 400 INJECTION INTRAVENOUS at 11:07

## 2021-01-01 RX ADMIN — Medication 15 MCG: at 21:59

## 2021-01-01 RX ADMIN — FENTANYL CITRATE 50 MCG: 50 INJECTION, SOLUTION INTRAMUSCULAR; INTRAVENOUS at 07:15

## 2021-01-01 RX ADMIN — KETAMINE HYDROCHLORIDE 0.2 MG/KG/HR: 50 INJECTION INTRAMUSCULAR; INTRAVENOUS at 00:25

## 2021-01-01 RX ADMIN — OXYCODONE HYDROCHLORIDE 5 MG: 5 TABLET ORAL at 17:29

## 2021-01-01 RX ADMIN — SODIUM CHLORIDE, POTASSIUM CHLORIDE, SODIUM LACTATE AND CALCIUM CHLORIDE: 600; 310; 30; 20 INJECTION, SOLUTION INTRAVENOUS at 10:12

## 2021-01-01 RX ADMIN — DIAZEPAM 5 MG: 5 TABLET ORAL at 00:25

## 2021-01-01 RX ADMIN — EPINEPHRINE 0.3 MG: 0.1 INJECTION INTRACARDIAC; INTRAVENOUS at 04:04

## 2021-01-01 RX ADMIN — DIAZEPAM 10 MG: 5 TABLET ORAL at 06:54

## 2021-01-01 RX ADMIN — FENTANYL CITRATE 50 MCG: 50 INJECTION, SOLUTION INTRAMUSCULAR; INTRAVENOUS at 14:15

## 2021-01-01 RX ADMIN — DIAZEPAM 10 MG: 5 TABLET ORAL at 05:49

## 2021-01-01 RX ADMIN — PIPERACILLIN AND TAZOBACTAM 4500 MG: 4; .5 INJECTION, POWDER, LYOPHILIZED, FOR SOLUTION INTRAVENOUS; PARENTERAL at 14:49

## 2021-01-01 RX ADMIN — DIAZEPAM 10 MG: 5 TABLET ORAL at 00:36

## 2021-01-01 RX ADMIN — PAROXETINE HYDROCHLORIDE HEMIHYDRATE 20 MG: 20 TABLET, FILM COATED ORAL at 08:42

## 2021-01-01 RX ADMIN — Medication 250 MG: at 13:03

## 2021-01-01 RX ADMIN — ROCURONIUM BROMIDE 20 MG: 10 INJECTION INTRAVENOUS at 11:12

## 2021-01-01 RX ADMIN — DIAZEPAM 10 MG: 5 TABLET ORAL at 00:01

## 2021-01-01 RX ADMIN — QUETIAPINE FUMARATE 75 MG: 25 TABLET ORAL at 13:25

## 2021-01-01 RX ADMIN — HALOPERIDOL LACTATE 5 MG: 5 INJECTION, SOLUTION INTRAMUSCULAR at 13:57

## 2021-01-01 RX ADMIN — IPRATROPIUM BROMIDE AND ALBUTEROL SULFATE 1 AMPULE: .5; 3 SOLUTION RESPIRATORY (INHALATION) at 11:26

## 2021-01-01 RX ADMIN — FAMOTIDINE 20 MG: 10 INJECTION INTRAVENOUS at 23:35

## 2021-01-01 RX ADMIN — SODIUM CHLORIDE, POTASSIUM CHLORIDE, SODIUM LACTATE AND CALCIUM CHLORIDE 1000 ML: 600; 310; 30; 20 INJECTION, SOLUTION INTRAVENOUS at 23:11

## 2021-01-01 RX ADMIN — GABAPENTIN 300 MG: 250 SUSPENSION ORAL at 21:21

## 2021-01-01 RX ADMIN — SODIUM CHLORIDE, POTASSIUM CHLORIDE, SODIUM LACTATE AND CALCIUM CHLORIDE 500 ML: 600; 310; 30; 20 INJECTION, SOLUTION INTRAVENOUS at 11:35

## 2021-01-01 RX ADMIN — FENTANYL CITRATE 50 MCG: 50 INJECTION, SOLUTION INTRAMUSCULAR; INTRAVENOUS at 08:00

## 2021-01-01 RX ADMIN — ENOXAPARIN SODIUM 40 MG: 40 INJECTION SUBCUTANEOUS at 08:31

## 2021-01-01 RX ADMIN — DEXMEDETOMIDINE HYDROCHLORIDE 1.4 MCG/KG/HR: 400 INJECTION INTRAVENOUS at 07:24

## 2021-01-01 RX ADMIN — DIAZEPAM 5 MG: 5 TABLET ORAL at 13:25

## 2021-01-01 RX ADMIN — POTASSIUM BICARBONATE 20 MEQ: 782 TABLET, EFFERVESCENT ORAL at 12:35

## 2021-01-01 RX ADMIN — SODIUM CHLORIDE: 9 INJECTION, SOLUTION INTRAVENOUS at 06:15

## 2021-01-01 RX ADMIN — PROPOFOL 200 MG: 10 INJECTION, EMULSION INTRAVENOUS at 10:22

## 2021-01-01 RX ADMIN — FENTANYL CITRATE 50 MCG: 50 INJECTION, SOLUTION INTRAMUSCULAR; INTRAVENOUS at 18:50

## 2021-01-01 RX ADMIN — PIPERACILLIN AND TAZOBACTAM 4500 MG: 4; .5 INJECTION, POWDER, LYOPHILIZED, FOR SOLUTION INTRAVENOUS; PARENTERAL at 15:08

## 2021-01-01 RX ADMIN — METOCLOPRAMIDE 10 MG: 5 INJECTION, SOLUTION INTRAMUSCULAR; INTRAVENOUS at 11:46

## 2021-01-01 RX ADMIN — SODIUM CHLORIDE, POTASSIUM CHLORIDE, SODIUM LACTATE AND CALCIUM CHLORIDE 500 ML: 600; 310; 30; 20 INJECTION, SOLUTION INTRAVENOUS at 14:45

## 2021-01-01 RX ADMIN — DIAZEPAM 10 MG: 5 TABLET ORAL at 00:02

## 2021-01-01 RX ADMIN — HALOPERIDOL LACTATE 5 MG: 5 INJECTION, SOLUTION INTRAMUSCULAR at 04:35

## 2021-01-01 RX ADMIN — ENOXAPARIN SODIUM 40 MG: 40 INJECTION SUBCUTANEOUS at 08:49

## 2021-01-01 RX ADMIN — LEVOTHYROXINE SODIUM 75 MCG: 75 TABLET ORAL at 06:34

## 2021-01-01 RX ADMIN — SODIUM CHLORIDE, POTASSIUM CHLORIDE, SODIUM LACTATE AND CALCIUM CHLORIDE: 600; 310; 30; 20 INJECTION, SOLUTION INTRAVENOUS at 10:20

## 2021-01-01 RX ADMIN — IPRATROPIUM BROMIDE AND ALBUTEROL SULFATE 1 AMPULE: .5; 3 SOLUTION RESPIRATORY (INHALATION) at 15:59

## 2021-01-01 RX ADMIN — FAMOTIDINE 20 MG: 20 TABLET, FILM COATED ORAL at 19:59

## 2021-01-01 RX ADMIN — ACETAMINOPHEN 1000 MG: 650 SOLUTION ORAL at 12:02

## 2021-01-01 RX ADMIN — DIAZEPAM 10 MG: 5 TABLET ORAL at 05:21

## 2021-01-01 RX ADMIN — DEXMEDETOMIDINE HYDROCHLORIDE 0.6 MCG/KG/HR: 400 INJECTION INTRAVENOUS at 00:25

## 2021-01-01 RX ADMIN — GABAPENTIN 300 MG: 250 SUSPENSION ORAL at 21:27

## 2021-01-01 RX ADMIN — IBUPROFEN 400 MG: 400 TABLET, FILM COATED ORAL at 00:25

## 2021-01-01 RX ADMIN — Medication 75 MCG/HR: at 11:55

## 2021-01-01 RX ADMIN — DEXMEDETOMIDINE HYDROCHLORIDE 1.3 MCG/KG/HR: 400 INJECTION INTRAVENOUS at 23:56

## 2021-01-01 RX ADMIN — SODIUM CHLORIDE, POTASSIUM CHLORIDE, SODIUM LACTATE AND CALCIUM CHLORIDE: 600; 310; 30; 20 INJECTION, SOLUTION INTRAVENOUS at 02:48

## 2021-01-01 RX ADMIN — MIDAZOLAM HYDROCHLORIDE 2 MG: 1 INJECTION, SOLUTION INTRAMUSCULAR; INTRAVENOUS at 10:19

## 2021-01-01 RX ADMIN — OXYCODONE HYDROCHLORIDE 5 MG: 5 TABLET ORAL at 20:00

## 2021-01-01 RX ADMIN — SODIUM CHLORIDE, POTASSIUM CHLORIDE, SODIUM LACTATE AND CALCIUM CHLORIDE 1000 ML: 600; 310; 30; 20 INJECTION, SOLUTION INTRAVENOUS at 10:27

## 2021-01-01 RX ADMIN — QUETIAPINE FUMARATE 75 MG: 25 TABLET ORAL at 21:27

## 2021-01-01 RX ADMIN — GABAPENTIN 300 MG: 250 SUSPENSION ORAL at 16:15

## 2021-01-01 RX ADMIN — IBUPROFEN 400 MG: 400 TABLET, FILM COATED ORAL at 03:58

## 2021-01-01 RX ADMIN — SODIUM CHLORIDE, PRESERVATIVE FREE 10 ML: 5 INJECTION INTRAVENOUS at 20:29

## 2021-01-01 RX ADMIN — FENTANYL CITRATE 25 MCG: 50 INJECTION, SOLUTION INTRAMUSCULAR; INTRAVENOUS at 14:39

## 2021-01-01 RX ADMIN — GABAPENTIN 300 MG: 250 SUSPENSION ORAL at 05:21

## 2021-01-01 RX ADMIN — MIDAZOLAM HYDROCHLORIDE 2 MG: 1 INJECTION, SOLUTION INTRAMUSCULAR; INTRAVENOUS at 09:26

## 2021-01-01 RX ADMIN — ACETAMINOPHEN 1000 MG: 650 SOLUTION ORAL at 20:02

## 2021-01-01 RX ADMIN — SODIUM CHLORIDE, PRESERVATIVE FREE 10 ML: 5 INJECTION INTRAVENOUS at 08:43

## 2021-01-01 RX ADMIN — HYDROMORPHONE HYDROCHLORIDE 0.5 MG: 1 INJECTION, SOLUTION INTRAMUSCULAR; INTRAVENOUS; SUBCUTANEOUS at 14:50

## 2021-01-01 RX ADMIN — FAMOTIDINE 20 MG: 20 TABLET, FILM COATED ORAL at 19:52

## 2021-01-01 RX ADMIN — ONDANSETRON 4 MG: 2 INJECTION INTRAMUSCULAR; INTRAVENOUS at 20:08

## 2021-01-01 RX ADMIN — FAMOTIDINE 20 MG: 20 TABLET, FILM COATED ORAL at 13:29

## 2021-01-01 RX ADMIN — FOLIC ACID 1 MG: 1 TABLET ORAL at 08:31

## 2021-01-01 RX ADMIN — LIDOCAINE HYDROCHLORIDE 50 MG: 10 INJECTION, SOLUTION EPIDURAL; INFILTRATION; INTRACAUDAL; PERINEURAL at 10:22

## 2021-01-01 RX ADMIN — POTASSIUM BICARBONATE 40 MEQ: 782 TABLET, EFFERVESCENT ORAL at 08:41

## 2021-01-01 RX ADMIN — PAROXETINE HYDROCHLORIDE HEMIHYDRATE 20 MG: 20 TABLET, FILM COATED ORAL at 08:43

## 2021-01-01 RX ADMIN — Medication 300 MCG: at 04:01

## 2021-01-01 RX ADMIN — FENTANYL CITRATE 50 MCG: 50 INJECTION, SOLUTION INTRAMUSCULAR; INTRAVENOUS at 13:30

## 2021-01-01 RX ADMIN — QUETIAPINE FUMARATE 75 MG: 25 TABLET ORAL at 19:52

## 2021-01-01 RX ADMIN — DEXMEDETOMIDINE HYDROCHLORIDE 1 MCG/KG/HR: 400 INJECTION INTRAVENOUS at 22:12

## 2021-01-01 RX ADMIN — QUETIAPINE FUMARATE 75 MG: 25 TABLET ORAL at 20:02

## 2021-01-01 RX ADMIN — LORAZEPAM 1 MG: 2 INJECTION INTRAMUSCULAR at 18:02

## 2021-01-01 RX ADMIN — IPRATROPIUM BROMIDE AND ALBUTEROL SULFATE 1 AMPULE: .5; 3 SOLUTION RESPIRATORY (INHALATION) at 03:20

## 2021-01-01 RX ADMIN — EPINEPHRINE 0.3 MG: 0.1 INJECTION INTRACARDIAC; INTRAVENOUS at 03:45

## 2021-01-01 RX ADMIN — FAMOTIDINE 20 MG: 20 TABLET, FILM COATED ORAL at 08:02

## 2021-01-01 RX ADMIN — KETOROLAC TROMETHAMINE 15 MG: 30 INJECTION, SOLUTION INTRAMUSCULAR; INTRAVENOUS at 08:49

## 2021-01-01 RX ADMIN — FOLIC ACID 1 MG: 1 TABLET ORAL at 08:38

## 2021-01-01 RX ADMIN — Medication 250 MG: at 08:42

## 2021-01-01 RX ADMIN — METOCLOPRAMIDE 10 MG: 5 INJECTION, SOLUTION INTRAMUSCULAR; INTRAVENOUS at 17:58

## 2021-01-01 RX ADMIN — CEFAZOLIN 2000 MG: 1 INJECTION, POWDER, FOR SOLUTION INTRAMUSCULAR; INTRAVENOUS at 02:18

## 2021-01-01 RX ADMIN — Medication 3 MG: at 12:11

## 2021-01-01 RX ADMIN — KETAMINE HYDROCHLORIDE 0.2 MG/KG/HR: 50 INJECTION INTRAMUSCULAR; INTRAVENOUS at 12:03

## 2021-01-01 RX ADMIN — PAROXETINE HYDROCHLORIDE HEMIHYDRATE 20 MG: 20 TABLET, FILM COATED ORAL at 08:31

## 2021-01-01 RX ADMIN — DEXMEDETOMIDINE HYDROCHLORIDE 1.3 MCG/KG/HR: 400 INJECTION INTRAVENOUS at 08:38

## 2021-01-01 RX ADMIN — ACETAMINOPHEN 1000 MG: 650 SOLUTION ORAL at 12:08

## 2021-01-01 RX ADMIN — PIPERACILLIN AND TAZOBACTAM 4500 MG: 4; .5 INJECTION, POWDER, LYOPHILIZED, FOR SOLUTION INTRAVENOUS; PARENTERAL at 16:59

## 2021-01-01 RX ADMIN — DEXTROSE AND SODIUM CHLORIDE: 5; 450 INJECTION, SOLUTION INTRAVENOUS at 04:00

## 2021-01-01 RX ADMIN — FOLIC ACID 1 MG: 1 TABLET ORAL at 12:16

## 2021-01-01 RX ADMIN — IBUPROFEN 400 MG: 400 TABLET, FILM COATED ORAL at 19:53

## 2021-01-01 RX ADMIN — POTASSIUM BICARBONATE 40 MEQ: 782 TABLET, EFFERVESCENT ORAL at 17:14

## 2021-01-01 RX ADMIN — KETOROLAC TROMETHAMINE 15 MG: 30 INJECTION, SOLUTION INTRAMUSCULAR; INTRAVENOUS at 15:10

## 2021-01-01 RX ADMIN — PIPERACILLIN AND TAZOBACTAM 4500 MG: 4; .5 INJECTION, POWDER, LYOPHILIZED, FOR SOLUTION INTRAVENOUS; PARENTERAL at 10:23

## 2021-01-01 RX ADMIN — IPRATROPIUM BROMIDE AND ALBUTEROL SULFATE 1 AMPULE: .5; 3 SOLUTION RESPIRATORY (INHALATION) at 23:17

## 2021-01-01 RX ADMIN — ENOXAPARIN SODIUM 40 MG: 40 INJECTION SUBCUTANEOUS at 08:42

## 2021-01-01 RX ADMIN — FOLIC ACID 1 MG: 1 TABLET ORAL at 08:43

## 2021-01-01 RX ADMIN — PIPERACILLIN AND TAZOBACTAM 4500 MG: 4; .5 INJECTION, POWDER, LYOPHILIZED, FOR SOLUTION INTRAVENOUS; PARENTERAL at 17:17

## 2021-01-01 RX ADMIN — PAROXETINE HYDROCHLORIDE HEMIHYDRATE 20 MG: 20 TABLET, FILM COATED ORAL at 15:08

## 2021-01-01 RX ADMIN — ACETAMINOPHEN 1000 MG: 650 SOLUTION ORAL at 21:26

## 2021-01-01 RX ADMIN — DIAZEPAM 10 MG: 5 TABLET ORAL at 17:00

## 2021-01-01 RX ADMIN — CALCIUM CHLORIDE 1000 MG: 100 INJECTION, SOLUTION INTRAVENOUS; INTRAVENTRICULAR at 16:05

## 2021-01-01 RX ADMIN — SODIUM CHLORIDE, PRESERVATIVE FREE 10 ML: 5 INJECTION INTRAVENOUS at 20:02

## 2021-01-01 RX ADMIN — PIPERACILLIN AND TAZOBACTAM 4500 MG: 4; .5 INJECTION, POWDER, LYOPHILIZED, FOR SOLUTION INTRAVENOUS; PARENTERAL at 01:56

## 2021-01-01 RX ADMIN — DEXTROSE AND SODIUM CHLORIDE: 5; 450 INJECTION, SOLUTION INTRAVENOUS at 12:06

## 2021-01-01 RX ADMIN — ONDANSETRON 4 MG: 2 INJECTION INTRAMUSCULAR; INTRAVENOUS at 05:32

## 2021-01-01 RX ADMIN — LEVOTHYROXINE SODIUM 75 MCG: 75 TABLET ORAL at 08:14

## 2021-01-01 RX ADMIN — FAMOTIDINE 20 MG: 10 INJECTION INTRAVENOUS at 08:50

## 2021-01-01 RX ADMIN — KETOROLAC TROMETHAMINE 15 MG: 30 INJECTION, SOLUTION INTRAMUSCULAR; INTRAVENOUS at 17:00

## 2021-01-01 RX ADMIN — POTASSIUM BICARBONATE 40 MEQ: 782 TABLET, EFFERVESCENT ORAL at 12:11

## 2021-01-01 RX ADMIN — DEXMEDETOMIDINE HYDROCHLORIDE 0.5 MCG/KG/HR: 400 INJECTION INTRAVENOUS at 12:41

## 2021-01-01 RX ADMIN — OXYCODONE HYDROCHLORIDE 5 MG: 5 TABLET ORAL at 13:25

## 2021-01-01 RX ADMIN — METOCLOPRAMIDE 10 MG: 5 INJECTION, SOLUTION INTRAMUSCULAR; INTRAVENOUS at 18:02

## 2021-01-01 RX ADMIN — DEXMEDETOMIDINE HYDROCHLORIDE 0.9 MCG/KG/HR: 400 INJECTION INTRAVENOUS at 06:35

## 2021-01-01 RX ADMIN — FOLIC ACID 1 MG: 1 TABLET ORAL at 08:15

## 2021-01-01 RX ADMIN — PAROXETINE HYDROCHLORIDE HEMIHYDRATE 20 MG: 20 TABLET, FILM COATED ORAL at 13:27

## 2021-01-01 RX ADMIN — DEXMEDETOMIDINE HYDROCHLORIDE 0.6 MCG/KG/HR: 400 INJECTION INTRAVENOUS at 04:52

## 2021-01-01 RX ADMIN — GABAPENTIN 300 MG: 250 SUSPENSION ORAL at 21:30

## 2021-01-01 RX ADMIN — HALOPERIDOL LACTATE 5 MG: 5 INJECTION, SOLUTION INTRAMUSCULAR at 02:27

## 2021-01-01 RX ADMIN — PIPERACILLIN AND TAZOBACTAM 4500 MG: 4; .5 INJECTION, POWDER, LYOPHILIZED, FOR SOLUTION INTRAVENOUS; PARENTERAL at 17:04

## 2021-01-01 RX ADMIN — Medication 10 UNITS/HR: at 04:09

## 2021-01-01 RX ADMIN — Medication 100 MCG/HR: at 05:20

## 2021-01-01 RX ADMIN — DIAZEPAM 5 MG: 5 TABLET ORAL at 17:54

## 2021-01-01 RX ADMIN — Medication 300 MCG: at 02:46

## 2021-01-01 RX ADMIN — FENTANYL CITRATE 200 MCG: 50 INJECTION, SOLUTION INTRAMUSCULAR; INTRAVENOUS at 13:46

## 2021-01-01 RX ADMIN — POTASSIUM PHOSPHATE, MONOBASIC AND POTASSIUM PHOSPHATE, DIBASIC 30 MMOL: 224; 236 INJECTION, SOLUTION, CONCENTRATE INTRAVENOUS at 10:16

## 2021-01-01 RX ADMIN — KETOROLAC TROMETHAMINE 15 MG: 30 INJECTION, SOLUTION INTRAMUSCULAR; INTRAVENOUS at 15:09

## 2021-01-01 RX ADMIN — THIAMINE HYDROCHLORIDE 500 MG: 100 INJECTION, SOLUTION INTRAMUSCULAR; INTRAVENOUS at 11:50

## 2021-01-01 RX ADMIN — SODIUM BICARBONATE 50 MEQ: 84 INJECTION, SOLUTION INTRAVENOUS at 03:23

## 2021-01-01 RX ADMIN — DIAZEPAM 10 MG: 5 TABLET ORAL at 00:45

## 2021-01-01 RX ADMIN — QUETIAPINE FUMARATE 75 MG: 25 TABLET ORAL at 08:40

## 2021-01-01 RX ADMIN — MAGNESIUM SULFATE 1000 MG: 1 INJECTION INTRAVENOUS at 09:11

## 2021-01-01 RX ADMIN — ENOXAPARIN SODIUM 40 MG: 40 INJECTION SUBCUTANEOUS at 08:15

## 2021-01-01 RX ADMIN — IOPAMIDOL 75 ML: 755 INJECTION, SOLUTION INTRAVENOUS at 08:11

## 2021-01-01 RX ADMIN — DEXMEDETOMIDINE HYDROCHLORIDE 1.3 MCG/KG/HR: 400 INJECTION INTRAVENOUS at 21:33

## 2021-01-01 RX ADMIN — ACETAMINOPHEN 1000 MG: 650 SOLUTION ORAL at 05:48

## 2021-01-01 RX ADMIN — PIPERACILLIN AND TAZOBACTAM 4500 MG: 4; .5 INJECTION, POWDER, LYOPHILIZED, FOR SOLUTION INTRAVENOUS; PARENTERAL at 22:28

## 2021-01-01 RX ADMIN — SODIUM CHLORIDE, PRESERVATIVE FREE 10 ML: 5 INJECTION INTRAVENOUS at 08:44

## 2021-01-01 RX ADMIN — FENTANYL CITRATE 50 MCG: 50 INJECTION, SOLUTION INTRAMUSCULAR; INTRAVENOUS at 21:02

## 2021-01-01 RX ADMIN — LEVOTHYROXINE SODIUM 75 MCG: 75 TABLET ORAL at 05:20

## 2021-01-01 RX ADMIN — PIPERACILLIN AND TAZOBACTAM 4500 MG: 4; .5 INJECTION, POWDER, LYOPHILIZED, FOR SOLUTION INTRAVENOUS; PARENTERAL at 19:55

## 2021-01-01 RX ADMIN — EPINEPHRINE 0.3 MG: 0.1 INJECTION INTRACARDIAC; INTRAVENOUS at 03:29

## 2021-01-01 RX ADMIN — CALCIUM CHLORIDE 1 G: 100 INJECTION, SOLUTION INTRAVENOUS; INTRAVENTRICULAR at 02:19

## 2021-01-01 RX ADMIN — DEXAMETHASONE SODIUM PHOSPHATE 4 MG: 4 INJECTION, SOLUTION INTRAMUSCULAR; INTRAVENOUS at 10:42

## 2021-01-01 RX ADMIN — METOCLOPRAMIDE 10 MG: 5 INJECTION, SOLUTION INTRAMUSCULAR; INTRAVENOUS at 05:49

## 2021-01-01 RX ADMIN — FENTANYL CITRATE 50 MCG: 50 INJECTION, SOLUTION INTRAMUSCULAR; INTRAVENOUS at 14:20

## 2021-01-01 RX ADMIN — KETAMINE HYDROCHLORIDE 0.2 MG/KG/HR: 50 INJECTION INTRAMUSCULAR; INTRAVENOUS at 09:03

## 2021-01-01 RX ADMIN — SODIUM CHLORIDE, PRESERVATIVE FREE 10 ML: 5 INJECTION INTRAVENOUS at 08:42

## 2021-01-01 RX ADMIN — GABAPENTIN 300 MG: 250 SUSPENSION ORAL at 14:04

## 2021-01-01 RX ADMIN — FAMOTIDINE 20 MG: 20 TABLET, FILM COATED ORAL at 21:15

## 2021-01-01 RX ADMIN — QUETIAPINE FUMARATE 75 MG: 25 TABLET ORAL at 08:32

## 2021-01-01 RX ADMIN — PIPERACILLIN AND TAZOBACTAM 4500 MG: 4; .5 INJECTION, POWDER, LYOPHILIZED, FOR SOLUTION INTRAVENOUS; PARENTERAL at 21:15

## 2021-01-01 RX ADMIN — SODIUM CHLORIDE 1000 ML: 9 INJECTION, SOLUTION INTRAVENOUS at 07:58

## 2021-01-01 RX ADMIN — ROCURONIUM BROMIDE 10 MG: 10 INJECTION INTRAVENOUS at 11:20

## 2021-01-01 RX ADMIN — PROPOFOL 10 MCG/KG/MIN: 10 INJECTION, EMULSION INTRAVENOUS at 07:05

## 2021-01-01 RX ADMIN — ACETAMINOPHEN 1000 MG: 650 SOLUTION ORAL at 06:54

## 2021-01-01 RX ADMIN — HYDROMORPHONE HYDROCHLORIDE 0.5 MG: 1 INJECTION, SOLUTION INTRAMUSCULAR; INTRAVENOUS; SUBCUTANEOUS at 14:35

## 2021-01-01 RX ADMIN — SODIUM CHLORIDE, PRESERVATIVE FREE 10 ML: 5 INJECTION INTRAVENOUS at 21:03

## 2021-01-01 RX ADMIN — DEXMEDETOMIDINE HYDROCHLORIDE 0.2 MCG/KG/HR: 400 INJECTION INTRAVENOUS at 22:06

## 2021-01-01 RX ADMIN — MAGNESIUM SULFATE 2000 MG: 2 INJECTION INTRAVENOUS at 13:45

## 2021-01-01 RX ADMIN — THIAMINE HYDROCHLORIDE 500 MG: 100 INJECTION, SOLUTION INTRAMUSCULAR; INTRAVENOUS at 19:52

## 2021-01-01 RX ADMIN — PIPERACILLIN AND TAZOBACTAM 4500 MG: 4; .5 INJECTION, POWDER, LYOPHILIZED, FOR SOLUTION INTRAVENOUS; PARENTERAL at 10:02

## 2021-01-01 RX ADMIN — ACETAMINOPHEN 1000 MG: 650 SOLUTION ORAL at 20:25

## 2021-01-01 RX ADMIN — GABAPENTIN 300 MG: 250 SUSPENSION ORAL at 13:45

## 2021-01-01 RX ADMIN — PIPERACILLIN AND TAZOBACTAM 4500 MG: 4; .5 INJECTION, POWDER, LYOPHILIZED, FOR SOLUTION INTRAVENOUS; PARENTERAL at 09:54

## 2021-01-01 RX ADMIN — Medication 2 MCG/MIN: at 23:13

## 2021-01-01 RX ADMIN — LEVOTHYROXINE SODIUM 75 MCG: 75 TABLET ORAL at 08:31

## 2021-01-01 RX ADMIN — SODIUM CHLORIDE, POTASSIUM CHLORIDE, SODIUM LACTATE AND CALCIUM CHLORIDE: 600; 310; 30; 20 INJECTION, SOLUTION INTRAVENOUS at 16:05

## 2021-01-01 RX ADMIN — SODIUM BICARBONATE 50 MEQ: 84 INJECTION, SOLUTION INTRAVENOUS at 02:59

## 2021-01-01 RX ADMIN — Medication 250 MG: at 13:26

## 2021-01-01 RX ADMIN — DEXMEDETOMIDINE HYDROCHLORIDE 1.3 MCG/KG/HR: 400 INJECTION INTRAVENOUS at 16:47

## 2021-01-01 RX ADMIN — FENTANYL CITRATE 100 MCG: 50 INJECTION, SOLUTION INTRAMUSCULAR; INTRAVENOUS at 13:19

## 2021-01-01 RX ADMIN — THIAMINE HYDROCHLORIDE 500 MG: 100 INJECTION, SOLUTION INTRAMUSCULAR; INTRAVENOUS at 14:28

## 2021-01-01 RX ADMIN — PIPERACILLIN AND TAZOBACTAM 4500 MG: 4; .5 INJECTION, POWDER, LYOPHILIZED, FOR SOLUTION INTRAVENOUS; PARENTERAL at 01:40

## 2021-01-01 RX ADMIN — METOCLOPRAMIDE 10 MG: 5 INJECTION, SOLUTION INTRAMUSCULAR; INTRAVENOUS at 00:02

## 2021-01-01 RX ADMIN — KETOROLAC TROMETHAMINE 15 MG: 30 INJECTION, SOLUTION INTRAMUSCULAR; INTRAVENOUS at 21:02

## 2021-01-01 RX ADMIN — HYDROMORPHONE HYDROCHLORIDE 0.5 MG: 1 INJECTION, SOLUTION INTRAMUSCULAR; INTRAVENOUS; SUBCUTANEOUS at 12:45

## 2021-01-01 RX ADMIN — POTASSIUM BICARBONATE 20 MEQ: 782 TABLET, EFFERVESCENT ORAL at 10:15

## 2021-01-01 RX ADMIN — SODIUM CHLORIDE, PRESERVATIVE FREE 10 ML: 5 INJECTION INTRAVENOUS at 20:01

## 2021-01-01 RX ADMIN — GABAPENTIN 300 MG: 250 SUSPENSION ORAL at 05:02

## 2021-01-01 RX ADMIN — SODIUM PHOSPHATE, MONOBASIC, MONOHYDRATE 20 MMOL: 276; 142 INJECTION, SOLUTION INTRAVENOUS at 23:35

## 2021-01-01 RX ADMIN — GABAPENTIN 300 MG: 250 SUSPENSION ORAL at 05:49

## 2021-01-01 RX ADMIN — POTASSIUM BICARBONATE 20 MEQ: 782 TABLET, EFFERVESCENT ORAL at 15:57

## 2021-01-01 RX ADMIN — IBUPROFEN 400 MG: 200 SUSPENSION ORAL at 08:41

## 2021-01-01 RX ADMIN — SODIUM CHLORIDE, PRESERVATIVE FREE 10 ML: 5 INJECTION INTRAVENOUS at 08:16

## 2021-01-01 RX ADMIN — ROCURONIUM BROMIDE 50 MG: 10 INJECTION INTRAVENOUS at 10:21

## 2021-01-01 RX ADMIN — ACETAMINOPHEN 1000 MG: 650 SOLUTION ORAL at 19:53

## 2021-01-01 RX ADMIN — PIPERACILLIN AND TAZOBACTAM 4500 MG: 4; .5 INJECTION, POWDER, LYOPHILIZED, FOR SOLUTION INTRAVENOUS; PARENTERAL at 10:45

## 2021-01-01 RX ADMIN — ROCURONIUM BROMIDE 30 MG: 10 INJECTION INTRAVENOUS at 11:34

## 2021-01-01 RX ADMIN — Medication 125 MCG/HR: at 22:50

## 2021-01-01 RX ADMIN — METOCLOPRAMIDE 10 MG: 5 INJECTION, SOLUTION INTRAMUSCULAR; INTRAVENOUS at 05:01

## 2021-01-01 RX ADMIN — SODIUM CHLORIDE, POTASSIUM CHLORIDE, SODIUM LACTATE AND CALCIUM CHLORIDE 1000 ML: 600; 310; 30; 20 INJECTION, SOLUTION INTRAVENOUS at 21:01

## 2021-01-01 RX ADMIN — FENTANYL CITRATE 50 MCG: 50 INJECTION, SOLUTION INTRAMUSCULAR; INTRAVENOUS at 13:18

## 2021-01-01 RX ADMIN — SODIUM CHLORIDE, POTASSIUM CHLORIDE, SODIUM LACTATE AND CALCIUM CHLORIDE: 600; 310; 30; 20 INJECTION, SOLUTION INTRAVENOUS at 02:01

## 2021-01-01 RX ADMIN — EPINEPHRINE 0.3 MG: 0.1 INJECTION INTRACARDIAC; INTRAVENOUS at 03:25

## 2021-01-01 RX ADMIN — OXYCODONE HYDROCHLORIDE 5 MG: 5 TABLET ORAL at 14:43

## 2021-01-01 RX ADMIN — IBUPROFEN 400 MG: 200 SUSPENSION ORAL at 12:18

## 2021-01-01 RX ADMIN — MAGNESIUM SULFATE 2000 MG: 2 INJECTION INTRAVENOUS at 12:47

## 2021-01-01 RX ADMIN — GABAPENTIN 300 MG: 250 SUSPENSION ORAL at 22:28

## 2021-01-01 RX ADMIN — FENTANYL CITRATE 25 MCG: 50 INJECTION, SOLUTION INTRAMUSCULAR; INTRAVENOUS at 15:39

## 2021-01-01 RX ADMIN — DEXMEDETOMIDINE HYDROCHLORIDE 1 MCG/KG/HR: 400 INJECTION INTRAVENOUS at 05:30

## 2021-01-01 RX ADMIN — POTASSIUM BICARBONATE 40 MEQ: 782 TABLET, EFFERVESCENT ORAL at 08:01

## 2021-01-01 RX ADMIN — IBUPROFEN 400 MG: 400 TABLET, FILM COATED ORAL at 08:43

## 2021-01-01 RX ADMIN — PIPERACILLIN AND TAZOBACTAM 4500 MG: 4; .5 INJECTION, POWDER, LYOPHILIZED, FOR SOLUTION INTRAVENOUS; PARENTERAL at 01:42

## 2021-01-01 RX ADMIN — ENOXAPARIN SODIUM 40 MG: 40 INJECTION SUBCUTANEOUS at 08:02

## 2021-01-01 RX ADMIN — SODIUM CHLORIDE, POTASSIUM CHLORIDE, SODIUM LACTATE AND CALCIUM CHLORIDE 500 ML: 600; 310; 30; 20 INJECTION, SOLUTION INTRAVENOUS at 04:21

## 2021-01-01 RX ADMIN — SODIUM CHLORIDE, POTASSIUM CHLORIDE, SODIUM LACTATE AND CALCIUM CHLORIDE: 600; 310; 30; 20 INJECTION, SOLUTION INTRAVENOUS at 03:58

## 2021-01-01 RX ADMIN — ONDANSETRON 4 MG: 2 INJECTION INTRAMUSCULAR; INTRAVENOUS at 13:16

## 2021-01-01 RX ADMIN — HYDROMORPHONE HYDROCHLORIDE 0.5 MG: 1 INJECTION, SOLUTION INTRAMUSCULAR; INTRAVENOUS; SUBCUTANEOUS at 14:45

## 2021-01-01 RX ADMIN — NALOXEGOL OXALATE 25 MG: 12.5 TABLET, FILM COATED ORAL at 08:37

## 2021-01-01 RX ADMIN — Medication 4 MCG/MIN: at 09:00

## 2021-01-01 RX ADMIN — CALCIUM CHLORIDE 1 G: 100 INJECTION, SOLUTION INTRAVENOUS; INTRAVENTRICULAR at 03:28

## 2021-01-01 RX ADMIN — ACETAMINOPHEN 1000 MG: 650 SOLUTION ORAL at 12:16

## 2021-01-01 RX ADMIN — FAMOTIDINE 20 MG: 20 TABLET, FILM COATED ORAL at 08:38

## 2021-01-01 RX ADMIN — Medication 100 MCG/HR: at 20:20

## 2021-01-01 RX ADMIN — MAGNESIUM SULFATE 2000 MG: 2 INJECTION INTRAVENOUS at 08:19

## 2021-01-01 RX ADMIN — MAGNESIUM SULFATE 2000 MG: 2 INJECTION INTRAVENOUS at 08:31

## 2021-01-01 RX ADMIN — VASOPRESSIN 0.04 UNITS/MIN: 20 INJECTION INTRAVENOUS at 11:38

## 2021-01-01 RX ADMIN — FAMOTIDINE 20 MG: 20 TABLET, FILM COATED ORAL at 20:02

## 2021-01-01 RX ADMIN — GABAPENTIN 300 MG: 250 SUSPENSION ORAL at 15:01

## 2021-01-01 RX ADMIN — HYDROMORPHONE HYDROCHLORIDE 1 MG: 1 INJECTION, SOLUTION INTRAMUSCULAR; INTRAVENOUS; SUBCUTANEOUS at 09:34

## 2021-01-01 RX ADMIN — QUETIAPINE FUMARATE 75 MG: 25 TABLET ORAL at 19:53

## 2021-01-01 RX ADMIN — LEVOTHYROXINE SODIUM 75 MCG: 75 TABLET ORAL at 06:54

## 2021-01-01 RX ADMIN — DEXAMETHASONE SODIUM PHOSPHATE 4 MG: 4 INJECTION, SOLUTION INTRAMUSCULAR; INTRAVENOUS at 11:44

## 2021-01-01 RX ADMIN — THIAMINE HYDROCHLORIDE 500 MG: 100 INJECTION, SOLUTION INTRAMUSCULAR; INTRAVENOUS at 11:01

## 2021-01-01 RX ADMIN — LEVOTHYROXINE SODIUM 75 MCG: 75 TABLET ORAL at 06:21

## 2021-01-01 RX ADMIN — SODIUM BICARBONATE 50 MEQ: 84 INJECTION, SOLUTION INTRAVENOUS at 03:26

## 2021-01-01 RX ADMIN — ACETAMINOPHEN 1000 MG: 650 SOLUTION ORAL at 05:01

## 2021-01-01 RX ADMIN — GABAPENTIN 300 MG: 250 SUSPENSION ORAL at 12:10

## 2021-01-01 RX ADMIN — LEVOTHYROXINE SODIUM 75 MCG: 75 TABLET ORAL at 15:09

## 2021-01-01 RX ADMIN — FENTANYL CITRATE 50 MCG: 50 INJECTION, SOLUTION INTRAMUSCULAR; INTRAVENOUS at 16:47

## 2021-01-01 RX ADMIN — FENTANYL CITRATE 50 MCG: 50 INJECTION, SOLUTION INTRAMUSCULAR; INTRAVENOUS at 11:34

## 2021-01-01 RX ADMIN — DIAZEPAM 10 MG: 5 TABLET ORAL at 11:44

## 2021-01-01 RX ADMIN — PROPOFOL 150 MG: 10 INJECTION, EMULSION INTRAVENOUS at 10:21

## 2021-01-01 RX ADMIN — DIAZEPAM 5 MG: 5 TABLET ORAL at 12:35

## 2021-01-01 RX ADMIN — FAMOTIDINE 20 MG: 20 TABLET, FILM COATED ORAL at 20:25

## 2021-01-01 RX ADMIN — DIAZEPAM 5 MG: 5 TABLET ORAL at 06:35

## 2021-01-01 RX ADMIN — DEXMEDETOMIDINE HYDROCHLORIDE 1.3 MCG/KG/HR: 400 INJECTION INTRAVENOUS at 12:59

## 2021-01-01 RX ADMIN — ACETAMINOPHEN 1000 MG: 650 SOLUTION ORAL at 20:20

## 2021-01-01 RX ADMIN — FENTANYL CITRATE 50 MCG: 50 INJECTION, SOLUTION INTRAMUSCULAR; INTRAVENOUS at 12:13

## 2021-01-01 RX ADMIN — KETOROLAC TROMETHAMINE 15 MG: 30 INJECTION, SOLUTION INTRAMUSCULAR; INTRAVENOUS at 00:01

## 2021-01-01 RX ADMIN — QUETIAPINE FUMARATE 75 MG: 25 TABLET ORAL at 19:59

## 2021-01-01 RX ADMIN — SODIUM CHLORIDE: 9 INJECTION, SOLUTION INTRAVENOUS at 12:43

## 2021-01-01 RX ADMIN — IBUPROFEN 400 MG: 400 TABLET, FILM COATED ORAL at 12:13

## 2021-01-01 RX ADMIN — QUETIAPINE FUMARATE 75 MG: 25 TABLET ORAL at 08:15

## 2021-01-01 RX ADMIN — KETOROLAC TROMETHAMINE 15 MG: 30 INJECTION, SOLUTION INTRAMUSCULAR; INTRAVENOUS at 05:49

## 2021-01-01 RX ADMIN — Medication 100 MCG: at 10:52

## 2021-01-01 RX ADMIN — SODIUM CHLORIDE, PRESERVATIVE FREE 10 ML: 5 INJECTION INTRAVENOUS at 22:00

## 2021-01-01 RX ADMIN — ACETAMINOPHEN 1000 MG: 650 SOLUTION ORAL at 04:37

## 2021-01-01 RX ADMIN — Medication 100 MCG: at 10:38

## 2021-01-01 RX ADMIN — DIBASIC SODIUM PHOSPHATE, MONOBASIC POTASSIUM PHOSPHATE AND MONOBASIC SODIUM PHOSPHATE 2 TABLET: 852; 155; 130 TABLET ORAL at 08:41

## 2021-01-01 RX ADMIN — SODIUM CHLORIDE, POTASSIUM CHLORIDE, SODIUM LACTATE AND CALCIUM CHLORIDE: 600; 310; 30; 20 INJECTION, SOLUTION INTRAVENOUS at 15:34

## 2021-01-01 RX ADMIN — ONDANSETRON 4 MG: 2 INJECTION INTRAMUSCULAR; INTRAVENOUS at 04:52

## 2021-01-01 RX ADMIN — ROCURONIUM BROMIDE 20 MG: 10 INJECTION INTRAVENOUS at 12:00

## 2021-01-01 RX ADMIN — IPRATROPIUM BROMIDE AND ALBUTEROL SULFATE 1 AMPULE: .5; 3 SOLUTION RESPIRATORY (INHALATION) at 20:25

## 2021-01-01 RX ADMIN — ACETAMINOPHEN 1000 MG: 650 SOLUTION ORAL at 12:35

## 2021-01-01 RX ADMIN — FUROSEMIDE 40 MG: 10 INJECTION, SOLUTION INTRAMUSCULAR; INTRAVENOUS at 09:53

## 2021-01-01 RX ADMIN — FENTANYL CITRATE 25 MCG: 50 INJECTION, SOLUTION INTRAMUSCULAR; INTRAVENOUS at 01:50

## 2021-01-01 RX ADMIN — POTASSIUM PHOSPHATE, MONOBASIC AND POTASSIUM PHOSPHATE, DIBASIC 10 MMOL: 224; 236 INJECTION, SOLUTION, CONCENTRATE INTRAVENOUS at 13:31

## 2021-01-01 RX ADMIN — ACETAMINOPHEN 1000 MG: 650 SOLUTION ORAL at 20:00

## 2021-01-01 RX ADMIN — ONDANSETRON 4 MG: 4 TABLET, ORALLY DISINTEGRATING ORAL at 12:39

## 2021-01-01 RX ADMIN — DIAZEPAM 5 MG: 5 SOLUTION ORAL at 06:41

## 2021-01-01 RX ADMIN — GABAPENTIN 300 MG: 250 SUSPENSION ORAL at 19:54

## 2021-01-01 RX ADMIN — PIPERACILLIN AND TAZOBACTAM 3375 MG: 3; .375 INJECTION, POWDER, FOR SOLUTION INTRAVENOUS at 08:34

## 2021-01-01 RX ADMIN — PIPERACILLIN AND TAZOBACTAM 4500 MG: 4; .5 INJECTION, POWDER, LYOPHILIZED, FOR SOLUTION INTRAVENOUS; PARENTERAL at 20:00

## 2021-01-01 RX ADMIN — ACETAMINOPHEN 1000 MG: 650 SOLUTION ORAL at 05:20

## 2021-01-01 RX ADMIN — PIPERACILLIN AND TAZOBACTAM 4500 MG: 4; .5 INJECTION, POWDER, LYOPHILIZED, FOR SOLUTION INTRAVENOUS; PARENTERAL at 08:16

## 2021-01-01 RX ADMIN — DIAZEPAM 10 MG: 5 TABLET ORAL at 17:57

## 2021-01-01 RX ADMIN — HALOPERIDOL LACTATE 5 MG: 5 INJECTION, SOLUTION INTRAMUSCULAR at 15:29

## 2021-01-01 RX ADMIN — ROCURONIUM BROMIDE 50 MG: 10 INJECTION INTRAVENOUS at 02:05

## 2021-01-01 RX ADMIN — DIAZEPAM 10 MG: 5 TABLET ORAL at 12:09

## 2021-01-01 RX ADMIN — FUROSEMIDE 40 MG: 10 INJECTION, SOLUTION INTRAMUSCULAR; INTRAVENOUS at 08:42

## 2021-01-01 RX ADMIN — HALOPERIDOL LACTATE 5 MG: 5 INJECTION, SOLUTION INTRAMUSCULAR at 19:53

## 2021-01-01 RX ADMIN — DIAZEPAM 10 MG: 5 TABLET ORAL at 17:39

## 2021-01-01 RX ADMIN — DEXMEDETOMIDINE HYDROCHLORIDE 1 MCG/KG/HR: 400 INJECTION INTRAVENOUS at 14:29

## 2021-01-01 RX ADMIN — METOCLOPRAMIDE 10 MG: 5 INJECTION, SOLUTION INTRAMUSCULAR; INTRAVENOUS at 00:45

## 2021-01-01 RX ADMIN — ROCURONIUM BROMIDE 50 MG: 10 INJECTION INTRAVENOUS at 10:22

## 2021-01-01 RX ADMIN — KETOROLAC TROMETHAMINE 15 MG: 30 INJECTION, SOLUTION INTRAMUSCULAR; INTRAVENOUS at 06:10

## 2021-01-01 RX ADMIN — Medication 250 MG: at 08:02

## 2021-01-01 RX ADMIN — SODIUM CHLORIDE, POTASSIUM CHLORIDE, SODIUM LACTATE AND CALCIUM CHLORIDE 500 ML: 600; 310; 30; 20 INJECTION, SOLUTION INTRAVENOUS at 02:27

## 2021-01-01 RX ADMIN — FENTANYL CITRATE 100 MCG: 50 INJECTION, SOLUTION INTRAMUSCULAR; INTRAVENOUS at 11:21

## 2021-01-01 RX ADMIN — HALOPERIDOL LACTATE 5 MG: 5 INJECTION, SOLUTION INTRAMUSCULAR at 21:25

## 2021-01-01 RX ADMIN — IBUPROFEN 400 MG: 400 TABLET, FILM COATED ORAL at 00:20

## 2021-01-01 RX ADMIN — HALOPERIDOL LACTATE 5 MG: 5 INJECTION, SOLUTION INTRAMUSCULAR at 05:55

## 2021-01-01 RX ADMIN — DEXMEDETOMIDINE HYDROCHLORIDE 1.1 MCG/KG/HR: 400 INJECTION INTRAVENOUS at 06:35

## 2021-01-01 RX ADMIN — FUROSEMIDE 40 MG: 10 INJECTION, SOLUTION INTRAMUSCULAR; INTRAVENOUS at 12:47

## 2021-01-01 RX ADMIN — DEXMEDETOMIDINE HYDROCHLORIDE 1.3 MCG/KG/HR: 400 INJECTION INTRAVENOUS at 17:17

## 2021-01-01 RX ADMIN — Medication 300 MCG: at 02:33

## 2021-01-01 RX ADMIN — PAROXETINE HYDROCHLORIDE HEMIHYDRATE 20 MG: 20 TABLET, FILM COATED ORAL at 08:02

## 2021-01-01 RX ADMIN — IOPAMIDOL 75 ML: 755 INJECTION, SOLUTION INTRAVENOUS at 14:24

## 2021-01-01 RX ADMIN — ENOXAPARIN SODIUM 40 MG: 40 INJECTION SUBCUTANEOUS at 09:29

## 2021-01-01 RX ADMIN — KETAMINE HYDROCHLORIDE 0.2 MG/KG/HR: 50 INJECTION INTRAMUSCULAR; INTRAVENOUS at 23:13

## 2021-01-01 RX ADMIN — IBUPROFEN 400 MG: 400 TABLET, FILM COATED ORAL at 04:37

## 2021-01-01 RX ADMIN — FENTANYL CITRATE 50 MCG: 50 INJECTION, SOLUTION INTRAMUSCULAR; INTRAVENOUS at 11:30

## 2021-01-01 RX ADMIN — FAMOTIDINE 20 MG: 20 TABLET, FILM COATED ORAL at 08:31

## 2021-01-01 RX ADMIN — DEXMEDETOMIDINE HYDROCHLORIDE 1.2 MCG/KG/HR: 400 INJECTION INTRAVENOUS at 21:58

## 2021-01-01 RX ADMIN — FENTANYL CITRATE 25 MCG: 50 INJECTION, SOLUTION INTRAMUSCULAR; INTRAVENOUS at 02:15

## 2021-01-01 RX ADMIN — SODIUM CHLORIDE, POTASSIUM CHLORIDE, SODIUM LACTATE AND CALCIUM CHLORIDE: 600; 310; 30; 20 INJECTION, SOLUTION INTRAVENOUS at 21:57

## 2021-01-01 RX ADMIN — IBUPROFEN 400 MG: 200 SUSPENSION ORAL at 16:39

## 2021-01-01 RX ADMIN — FAMOTIDINE 20 MG: 20 TABLET, FILM COATED ORAL at 08:14

## 2021-01-01 RX ADMIN — MIDAZOLAM HYDROCHLORIDE 2 MG: 1 INJECTION, SOLUTION INTRAMUSCULAR; INTRAVENOUS at 10:21

## 2021-01-01 RX ADMIN — SODIUM CHLORIDE, POTASSIUM CHLORIDE, SODIUM LACTATE AND CALCIUM CHLORIDE: 600; 310; 30; 20 INJECTION, SOLUTION INTRAVENOUS at 04:20

## 2021-01-01 RX ADMIN — DEXMEDETOMIDINE HYDROCHLORIDE 0.7 MCG/KG/HR: 400 INJECTION INTRAVENOUS at 21:57

## 2021-01-01 RX ADMIN — EPINEPHRINE 1 MG: 0.1 INJECTION INTRACARDIAC; INTRAVENOUS at 02:15

## 2021-01-01 RX ADMIN — FUROSEMIDE 40 MG: 10 INJECTION, SOLUTION INTRAMUSCULAR; INTRAVENOUS at 15:02

## 2021-01-01 RX ADMIN — ACETAMINOPHEN 1000 MG: 650 SOLUTION ORAL at 13:48

## 2021-01-01 RX ADMIN — LORAZEPAM 1 MG: 2 INJECTION INTRAMUSCULAR at 11:31

## 2021-01-01 RX ADMIN — KETAMINE HYDROCHLORIDE 0.2 MG/KG/HR: 50 INJECTION INTRAMUSCULAR; INTRAVENOUS at 16:20

## 2021-01-01 RX ADMIN — DEXMEDETOMIDINE HYDROCHLORIDE 1.4 MCG/KG/HR: 400 INJECTION INTRAVENOUS at 03:27

## 2021-01-01 RX ADMIN — POTASSIUM BICARBONATE 40 MEQ: 782 TABLET, EFFERVESCENT ORAL at 08:16

## 2021-01-01 RX ADMIN — DEXTROSE AND SODIUM CHLORIDE: 5; 450 INJECTION, SOLUTION INTRAVENOUS at 13:23

## 2021-01-01 RX ADMIN — ENOXAPARIN SODIUM 40 MG: 40 INJECTION SUBCUTANEOUS at 13:27

## 2021-01-01 RX ADMIN — VASOPRESSIN 0.04 UNITS/MIN: 20 INJECTION INTRAVENOUS at 02:37

## 2021-01-01 RX ADMIN — SODIUM CHLORIDE, PRESERVATIVE FREE 10 ML: 5 INJECTION INTRAVENOUS at 20:25

## 2021-01-01 RX ADMIN — IBUPROFEN 400 MG: 400 TABLET, FILM COATED ORAL at 15:35

## 2021-01-01 RX ADMIN — HALOPERIDOL LACTATE 5 MG: 5 INJECTION, SOLUTION INTRAMUSCULAR at 20:38

## 2021-01-01 RX ADMIN — DEXMEDETOMIDINE HYDROCHLORIDE 0.9 MCG/KG/HR: 400 INJECTION INTRAVENOUS at 16:08

## 2021-01-01 RX ADMIN — QUETIAPINE FUMARATE 75 MG: 25 TABLET ORAL at 08:01

## 2021-01-01 RX ADMIN — GABAPENTIN 300 MG: 250 SUSPENSION ORAL at 22:44

## 2021-01-01 RX ADMIN — PIPERACILLIN AND TAZOBACTAM 4500 MG: 4; .5 INJECTION, POWDER, LYOPHILIZED, FOR SOLUTION INTRAVENOUS; PARENTERAL at 01:25

## 2021-01-01 RX ADMIN — SODIUM CHLORIDE, POTASSIUM CHLORIDE, SODIUM LACTATE AND CALCIUM CHLORIDE 1000 ML: 600; 310; 30; 20 INJECTION, SOLUTION INTRAVENOUS at 08:34

## 2021-01-01 RX ADMIN — Medication 100 MCG: at 10:45

## 2021-01-01 RX ADMIN — DIAZEPAM 10 MG: 5 TABLET ORAL at 10:57

## 2021-01-01 RX ADMIN — QUETIAPINE FUMARATE 75 MG: 25 TABLET ORAL at 20:25

## 2021-01-01 RX ADMIN — Medication 125 MCG/HR: at 03:21

## 2021-01-01 RX ADMIN — PIPERACILLIN AND TAZOBACTAM 4500 MG: 4; .5 INJECTION, POWDER, LYOPHILIZED, FOR SOLUTION INTRAVENOUS; PARENTERAL at 04:33

## 2021-01-01 RX ADMIN — OXYCODONE HYDROCHLORIDE 5 MG: 5 TABLET ORAL at 12:19

## 2021-01-01 RX ADMIN — DEXMEDETOMIDINE HYDROCHLORIDE 1.3 MCG/KG/HR: 400 INJECTION INTRAVENOUS at 01:22

## 2021-01-01 RX ADMIN — GABAPENTIN 300 MG: 250 SUSPENSION ORAL at 21:54

## 2021-01-01 RX ADMIN — LORAZEPAM 1 MG: 2 INJECTION INTRAMUSCULAR at 12:51

## 2021-01-01 RX ADMIN — THIAMINE HYDROCHLORIDE 500 MG: 100 INJECTION, SOLUTION INTRAMUSCULAR; INTRAVENOUS at 03:47

## 2021-01-01 RX ADMIN — ACETAMINOPHEN 1000 MG: 650 SOLUTION ORAL at 21:15

## 2021-01-01 RX ADMIN — DEXMEDETOMIDINE HYDROCHLORIDE 1.3 MCG/KG/HR: 400 INJECTION INTRAVENOUS at 04:01

## 2021-01-01 RX ADMIN — FENTANYL CITRATE 50 MCG: 50 INJECTION, SOLUTION INTRAMUSCULAR; INTRAVENOUS at 07:59

## 2021-01-01 RX ADMIN — PIPERACILLIN AND TAZOBACTAM 4500 MG: 4; .5 INJECTION, POWDER, LYOPHILIZED, FOR SOLUTION INTRAVENOUS; PARENTERAL at 15:29

## 2021-01-01 RX ADMIN — DIAZEPAM 5 MG: 5 TABLET ORAL at 17:31

## 2021-01-01 RX ADMIN — Medication 4 MG: at 13:16

## 2021-01-01 RX ADMIN — FUROSEMIDE 40 MG: 10 INJECTION, SOLUTION INTRAMUSCULAR; INTRAVENOUS at 14:49

## 2021-01-01 RX ADMIN — DEXTROSE AND SODIUM CHLORIDE: 5; 450 INJECTION, SOLUTION INTRAVENOUS at 01:29

## 2021-01-01 RX ADMIN — THIAMINE HYDROCHLORIDE 500 MG: 100 INJECTION, SOLUTION INTRAMUSCULAR; INTRAVENOUS at 20:00

## 2021-01-01 RX ADMIN — SODIUM BICARBONATE 50 MEQ: 84 INJECTION, SOLUTION INTRAVENOUS at 03:05

## 2021-01-01 RX ADMIN — MAGNESIUM SULFATE 2000 MG: 2 INJECTION INTRAVENOUS at 12:05

## 2021-01-01 RX ADMIN — METOCLOPRAMIDE 10 MG: 5 INJECTION, SOLUTION INTRAMUSCULAR; INTRAVENOUS at 12:11

## 2021-01-01 RX ADMIN — FAMOTIDINE 20 MG: 10 INJECTION INTRAVENOUS at 21:02

## 2021-01-01 RX ADMIN — ONDANSETRON 4 MG: 2 INJECTION INTRAMUSCULAR; INTRAVENOUS at 11:28

## 2021-01-01 RX ADMIN — Medication 100 MCG/HR: at 07:41

## 2021-01-01 RX ADMIN — METHOCARBAMOL TABLETS 750 MG: 750 TABLET, COATED ORAL at 11:02

## 2021-01-01 RX ADMIN — IBUPROFEN 400 MG: 400 TABLET, FILM COATED ORAL at 16:14

## 2021-01-01 RX ADMIN — KETOROLAC TROMETHAMINE 15 MG: 30 INJECTION, SOLUTION INTRAMUSCULAR; INTRAVENOUS at 12:10

## 2021-01-01 RX ADMIN — Medication 50 MCG/HR: at 08:10

## 2021-01-01 RX ADMIN — SODIUM BICARBONATE 50 MEQ: 84 INJECTION, SOLUTION INTRAVENOUS at 03:24

## 2021-01-01 RX ADMIN — Medication 5 MG: at 16:12

## 2021-01-01 RX ADMIN — FENTANYL CITRATE 100 MCG: 50 INJECTION, SOLUTION INTRAMUSCULAR; INTRAVENOUS at 10:19

## 2021-01-01 RX ADMIN — THIAMINE HYDROCHLORIDE 500 MG: 100 INJECTION, SOLUTION INTRAMUSCULAR; INTRAVENOUS at 03:46

## 2021-01-01 RX ADMIN — FAMOTIDINE 20 MG: 10 INJECTION INTRAVENOUS at 09:27

## 2021-01-01 RX ADMIN — OXYCODONE HYDROCHLORIDE 5 MG: 5 TABLET ORAL at 21:36

## 2021-01-01 RX ADMIN — POTASSIUM BICARBONATE 40 MEQ: 782 TABLET, EFFERVESCENT ORAL at 09:04

## 2021-01-01 RX ADMIN — KETOROLAC TROMETHAMINE 15 MG: 30 INJECTION, SOLUTION INTRAMUSCULAR; INTRAVENOUS at 23:35

## 2021-01-01 RX ADMIN — SODIUM CHLORIDE, PRESERVATIVE FREE 10 ML: 5 INJECTION INTRAVENOUS at 13:27

## 2021-01-01 RX ADMIN — SODIUM CHLORIDE: 9 INJECTION, SOLUTION INTRAVENOUS at 11:23

## 2021-01-01 RX ADMIN — SODIUM CHLORIDE, PRESERVATIVE FREE 10 ML: 5 INJECTION INTRAVENOUS at 09:17

## 2021-01-01 RX ADMIN — PIPERACILLIN AND TAZOBACTAM 4500 MG: 4; .5 INJECTION, POWDER, LYOPHILIZED, FOR SOLUTION INTRAVENOUS; PARENTERAL at 09:28

## 2021-01-01 RX ADMIN — PAROXETINE HYDROCHLORIDE HEMIHYDRATE 20 MG: 20 TABLET, FILM COATED ORAL at 08:38

## 2021-01-01 RX ADMIN — OXYCODONE HYDROCHLORIDE 5 MG: 5 TABLET ORAL at 11:49

## 2021-01-01 RX ADMIN — FENTANYL CITRATE 50 MCG: 50 INJECTION, SOLUTION INTRAMUSCULAR; INTRAVENOUS at 09:44

## 2021-01-01 RX ADMIN — THIAMINE HYDROCHLORIDE 500 MG: 100 INJECTION, SOLUTION INTRAMUSCULAR; INTRAVENOUS at 03:03

## 2021-01-01 RX ADMIN — GABAPENTIN 300 MG: 250 SUSPENSION ORAL at 12:49

## 2021-01-01 RX ADMIN — LEVOTHYROXINE SODIUM 75 MCG: 75 TABLET ORAL at 08:38

## 2021-01-01 RX ADMIN — Medication 400 MCG: at 03:11

## 2021-01-01 RX ADMIN — IBUPROFEN 400 MG: 200 SUSPENSION ORAL at 21:26

## 2021-01-01 RX ADMIN — OXYCODONE HYDROCHLORIDE 5 MG: 5 TABLET ORAL at 06:35

## 2021-01-01 RX ADMIN — ACETAMINOPHEN 1000 MG: 650 SOLUTION ORAL at 12:11

## 2021-01-01 RX ADMIN — LIDOCAINE HYDROCHLORIDE 50 MG: 10 INJECTION, SOLUTION EPIDURAL; INFILTRATION; INTRACAUDAL; PERINEURAL at 10:21

## 2021-01-01 RX ADMIN — DEXMEDETOMIDINE HYDROCHLORIDE 1.1 MCG/KG/HR: 400 INJECTION INTRAVENOUS at 09:36

## 2021-01-01 RX ADMIN — DEXTROSE AND SODIUM CHLORIDE: 5; 450 INJECTION, SOLUTION INTRAVENOUS at 05:12

## 2021-01-01 RX ADMIN — SODIUM CHLORIDE, PRESERVATIVE FREE 10 ML: 5 INJECTION INTRAVENOUS at 08:15

## 2021-01-01 ASSESSMENT — PULMONARY FUNCTION TESTS
PIF_VALUE: 16
PIF_VALUE: 22
PIF_VALUE: 18
PIF_VALUE: 27
PIF_VALUE: 20
PIF_VALUE: 26
PIF_VALUE: 20
PIF_VALUE: 20
PIF_VALUE: 19
PIF_VALUE: 19
PIF_VALUE: 18
PIF_VALUE: 20
PIF_VALUE: 17
PIF_VALUE: 24
PIF_VALUE: 19
PIF_VALUE: 26
PIF_VALUE: 17
PIF_VALUE: 20
PIF_VALUE: 20
PIF_VALUE: 18
PIF_VALUE: 20
PIF_VALUE: 20
PIF_VALUE: 21
PIF_VALUE: 25
PIF_VALUE: 6
PIF_VALUE: 17
PIF_VALUE: 26
PIF_VALUE: 19
PIF_VALUE: 20
PIF_VALUE: 22
PIF_VALUE: 19
PIF_VALUE: 24
PIF_VALUE: 23
PIF_VALUE: 20
PIF_VALUE: 21
PIF_VALUE: 21
PIF_VALUE: 24
PIF_VALUE: 24
PIF_VALUE: 22
PIF_VALUE: 23
PIF_VALUE: 28
PIF_VALUE: 19
PIF_VALUE: 24
PIF_VALUE: 20
PIF_VALUE: 17
PIF_VALUE: 20
PIF_VALUE: 18
PIF_VALUE: 26
PIF_VALUE: 23
PIF_VALUE: 20
PIF_VALUE: 21
PIF_VALUE: 22
PIF_VALUE: 19
PIF_VALUE: 19
PIF_VALUE: 23
PIF_VALUE: 23
PIF_VALUE: 20
PIF_VALUE: 18
PIF_VALUE: 12
PIF_VALUE: 20
PIF_VALUE: 21
PIF_VALUE: 1
PIF_VALUE: 18
PIF_VALUE: 24
PIF_VALUE: 2
PIF_VALUE: 18
PIF_VALUE: 17
PIF_VALUE: 20
PIF_VALUE: 19
PIF_VALUE: 21
PIF_VALUE: 19
PIF_VALUE: 20
PIF_VALUE: 26
PIF_VALUE: 17
PIF_VALUE: 16
PIF_VALUE: 26
PIF_VALUE: 20
PIF_VALUE: 24
PIF_VALUE: 20
PIF_VALUE: 21
PIF_VALUE: 23
PIF_VALUE: 20
PIF_VALUE: 14
PIF_VALUE: 18
PIF_VALUE: 20
PIF_VALUE: 22
PIF_VALUE: 17
PIF_VALUE: 22
PIF_VALUE: 20
PIF_VALUE: 22
PIF_VALUE: 21
PIF_VALUE: 20
PIF_VALUE: 20
PIF_VALUE: 19
PIF_VALUE: 2
PIF_VALUE: 0
PIF_VALUE: 16
PIF_VALUE: 6
PIF_VALUE: 24
PIF_VALUE: 38
PIF_VALUE: 20
PIF_VALUE: 18
PIF_VALUE: 1
PIF_VALUE: 19
PIF_VALUE: 20
PIF_VALUE: 19
PIF_VALUE: 23
PIF_VALUE: 17
PIF_VALUE: 21
PIF_VALUE: 21
PIF_VALUE: 3
PIF_VALUE: 17
PIF_VALUE: 22
PIF_VALUE: 20
PIF_VALUE: 19
PIF_VALUE: 24
PIF_VALUE: 26
PIF_VALUE: 24
PIF_VALUE: 2
PIF_VALUE: 19
PIF_VALUE: 20
PIF_VALUE: 24
PIF_VALUE: 21
PIF_VALUE: 20
PIF_VALUE: 22
PIF_VALUE: 1
PIF_VALUE: 20
PIF_VALUE: 24
PIF_VALUE: 22
PIF_VALUE: 1
PIF_VALUE: 24
PIF_VALUE: 19
PIF_VALUE: 18
PIF_VALUE: 36
PIF_VALUE: 18
PIF_VALUE: 20
PIF_VALUE: 22
PIF_VALUE: 22
PIF_VALUE: 18
PIF_VALUE: 21
PIF_VALUE: 20
PIF_VALUE: 18
PIF_VALUE: 17
PIF_VALUE: 24
PIF_VALUE: 18
PIF_VALUE: 25
PIF_VALUE: 20
PIF_VALUE: 24
PIF_VALUE: 19
PIF_VALUE: 18
PIF_VALUE: 18
PIF_VALUE: 20
PIF_VALUE: 19
PIF_VALUE: 20
PIF_VALUE: 14
PIF_VALUE: 21
PIF_VALUE: 26
PIF_VALUE: 17
PIF_VALUE: 21
PIF_VALUE: 20
PIF_VALUE: 24
PIF_VALUE: 20
PIF_VALUE: 22
PIF_VALUE: 18
PIF_VALUE: 16
PIF_VALUE: 1
PIF_VALUE: 18
PIF_VALUE: 17
PIF_VALUE: 25
PIF_VALUE: 0
PIF_VALUE: 19
PIF_VALUE: 1
PIF_VALUE: 19
PIF_VALUE: 24
PIF_VALUE: 24
PIF_VALUE: 19
PIF_VALUE: 30
PIF_VALUE: 20
PIF_VALUE: 23
PIF_VALUE: 21
PIF_VALUE: 24
PIF_VALUE: 18
PIF_VALUE: 19
PIF_VALUE: 17
PIF_VALUE: 2
PIF_VALUE: 14
PIF_VALUE: 21
PIF_VALUE: 19
PIF_VALUE: 20
PIF_VALUE: 21
PIF_VALUE: 21
PIF_VALUE: 1
PIF_VALUE: 22
PIF_VALUE: 21
PIF_VALUE: 26
PIF_VALUE: 5
PIF_VALUE: 19
PIF_VALUE: 18
PIF_VALUE: 22
PIF_VALUE: 19
PIF_VALUE: 20
PIF_VALUE: 17
PIF_VALUE: 5
PIF_VALUE: 20
PIF_VALUE: 20
PIF_VALUE: 24
PIF_VALUE: 21
PIF_VALUE: 20
PIF_VALUE: 24
PIF_VALUE: 24
PIF_VALUE: 21
PIF_VALUE: 0
PIF_VALUE: 1
PIF_VALUE: 24
PIF_VALUE: 14
PIF_VALUE: 26
PIF_VALUE: 1
PIF_VALUE: 22
PIF_VALUE: 20
PIF_VALUE: 1
PIF_VALUE: 14
PIF_VALUE: 22
PIF_VALUE: 17
PIF_VALUE: 20
PIF_VALUE: 19
PIF_VALUE: 20
PIF_VALUE: 16
PIF_VALUE: 23
PIF_VALUE: 19
PIF_VALUE: 24
PIF_VALUE: 26
PIF_VALUE: 11
PIF_VALUE: 27
PIF_VALUE: 24
PIF_VALUE: 20
PIF_VALUE: 14
PIF_VALUE: 21
PIF_VALUE: 20
PIF_VALUE: 3
PIF_VALUE: 19
PIF_VALUE: 23
PIF_VALUE: 19
PIF_VALUE: 20
PIF_VALUE: 20
PIF_VALUE: 4
PIF_VALUE: 22
PIF_VALUE: 21
PIF_VALUE: 17
PIF_VALUE: 17
PIF_VALUE: 36
PIF_VALUE: 16
PIF_VALUE: 25
PIF_VALUE: 19
PIF_VALUE: 20
PIF_VALUE: 32
PIF_VALUE: 20
PIF_VALUE: 21
PIF_VALUE: 36
PIF_VALUE: 27
PIF_VALUE: 20
PIF_VALUE: 36
PIF_VALUE: 16
PIF_VALUE: 19
PIF_VALUE: 21
PIF_VALUE: 9
PIF_VALUE: 20
PIF_VALUE: 20
PIF_VALUE: 19
PIF_VALUE: 22
PIF_VALUE: 20
PIF_VALUE: 11
PIF_VALUE: 19
PIF_VALUE: 20
PIF_VALUE: 17
PIF_VALUE: 15
PIF_VALUE: 33
PIF_VALUE: 20
PIF_VALUE: 21
PIF_VALUE: 17
PIF_VALUE: 24
PIF_VALUE: 24
PIF_VALUE: 21
PIF_VALUE: 20
PIF_VALUE: 17
PIF_VALUE: 20
PIF_VALUE: 25
PIF_VALUE: 24
PIF_VALUE: 17
PIF_VALUE: 20
PIF_VALUE: 18
PIF_VALUE: 20
PIF_VALUE: 21
PIF_VALUE: 24
PIF_VALUE: 20
PIF_VALUE: 19
PIF_VALUE: 19
PIF_VALUE: 24
PIF_VALUE: 23
PIF_VALUE: 25
PIF_VALUE: 20
PIF_VALUE: 0
PIF_VALUE: 17
PIF_VALUE: 17
PIF_VALUE: 20
PIF_VALUE: 20
PIF_VALUE: 26
PIF_VALUE: 1
PIF_VALUE: 23
PIF_VALUE: 24
PIF_VALUE: 23
PIF_VALUE: 20
PIF_VALUE: 14
PIF_VALUE: 22
PIF_VALUE: 18
PIF_VALUE: 17
PIF_VALUE: 18
PIF_VALUE: 18
PIF_VALUE: 17
PIF_VALUE: 39
PIF_VALUE: 27
PIF_VALUE: 21
PIF_VALUE: 17
PIF_VALUE: 17
PIF_VALUE: 19
PIF_VALUE: 22
PIF_VALUE: 16
PIF_VALUE: 23
PIF_VALUE: 22
PIF_VALUE: 21
PIF_VALUE: 21
PIF_VALUE: 36
PIF_VALUE: 24
PIF_VALUE: 1
PIF_VALUE: 20
PIF_VALUE: 23
PIF_VALUE: 23
PIF_VALUE: 22
PIF_VALUE: 21
PIF_VALUE: 17
PIF_VALUE: 21
PIF_VALUE: 21
PIF_VALUE: 22
PIF_VALUE: 19
PIF_VALUE: 23
PIF_VALUE: 19
PIF_VALUE: 24
PIF_VALUE: 20
PIF_VALUE: 21
PIF_VALUE: 18
PIF_VALUE: 24
PIF_VALUE: 18
PIF_VALUE: 21
PIF_VALUE: 19
PIF_VALUE: 20
PIF_VALUE: 21
PIF_VALUE: 14
PIF_VALUE: 21
PIF_VALUE: 23
PIF_VALUE: 24
PIF_VALUE: 21
PIF_VALUE: 17
PIF_VALUE: 23
PIF_VALUE: 20
PIF_VALUE: 17
PIF_VALUE: 27
PIF_VALUE: 21
PIF_VALUE: 0
PIF_VALUE: 21
PIF_VALUE: 20
PIF_VALUE: 21

## 2021-01-01 ASSESSMENT — PAIN SCALES - GENERAL
PAINLEVEL_OUTOF10: 10
PAINLEVEL_OUTOF10: 10
PAINLEVEL_OUTOF10: 9
PAINLEVEL_OUTOF10: 9
PAINLEVEL_OUTOF10: 8
PAINLEVEL_OUTOF10: 8
PAINLEVEL_OUTOF10: 10
PAINLEVEL_OUTOF10: 10
PAINLEVEL_OUTOF10: 9
PAINLEVEL_OUTOF10: 9
PAINLEVEL_OUTOF10: 10
PAINLEVEL_OUTOF10: 9
PAINLEVEL_OUTOF10: 8
PAINLEVEL_OUTOF10: 8
PAINLEVEL_OUTOF10: 10
PAINLEVEL_OUTOF10: 10
PAINLEVEL_OUTOF10: 8
PAINLEVEL_OUTOF10: 9
PAINLEVEL_OUTOF10: 7
PAINLEVEL_OUTOF10: 10
PAINLEVEL_OUTOF10: 9
PAINLEVEL_OUTOF10: 5
PAINLEVEL_OUTOF10: 7
PAINLEVEL_OUTOF10: 10
PAINLEVEL_OUTOF10: 10
PAINLEVEL_OUTOF10: 8
PAINLEVEL_OUTOF10: 8
PAINLEVEL_OUTOF10: 9
PAINLEVEL_OUTOF10: 8
PAINLEVEL_OUTOF10: 10
PAINLEVEL_OUTOF10: 8
PAINLEVEL_OUTOF10: 4

## 2021-01-01 ASSESSMENT — PAIN DESCRIPTION - PROGRESSION
CLINICAL_PROGRESSION: GRADUALLY IMPROVING
CLINICAL_PROGRESSION: GRADUALLY IMPROVING
CLINICAL_PROGRESSION: NOT CHANGED
CLINICAL_PROGRESSION: GRADUALLY IMPROVING

## 2021-01-01 ASSESSMENT — ENCOUNTER SYMPTOMS
SORE THROAT: 0
SHORTNESS OF BREATH: 0
COUGH: 0
SINUS PRESSURE: 0
WHEEZING: 0
TACHYPNEA: 1
COUGH: 0
NAUSEA: 1
SINUS PAIN: 0
ABDOMINAL PAIN: 1
PHOTOPHOBIA: 0
NAUSEA: 0
VOMITING: 1
BACK PAIN: 0
ABDOMINAL PAIN: 1
SHORTNESS OF BREATH: 1
CONSTIPATION: 1
VOMITING: 0
BLOOD IN STOOL: 0
SORE THROAT: 0
DIARRHEA: 0
PHOTOPHOBIA: 0

## 2021-01-01 ASSESSMENT — PAIN DESCRIPTION - FREQUENCY
FREQUENCY: CONTINUOUS

## 2021-01-01 ASSESSMENT — PAIN - FUNCTIONAL ASSESSMENT: PAIN_FUNCTIONAL_ASSESSMENT: PREVENTS OR INTERFERES SOME ACTIVE ACTIVITIES AND ADLS

## 2021-01-01 ASSESSMENT — PAIN DESCRIPTION - LOCATION
LOCATION: ABDOMEN

## 2021-01-01 ASSESSMENT — PAIN DESCRIPTION - ORIENTATION
ORIENTATION: MID

## 2021-01-01 ASSESSMENT — PAIN DESCRIPTION - PAIN TYPE
TYPE: SURGICAL PAIN
TYPE: ACUTE PAIN
TYPE: ACUTE PAIN
TYPE: SURGICAL PAIN
TYPE: ACUTE PAIN

## 2021-01-01 ASSESSMENT — PAIN DESCRIPTION - DESCRIPTORS
DESCRIPTORS: SHARP
DESCRIPTORS: SHARP;SHOOTING
DESCRIPTORS: SHARP
DESCRIPTORS: STABBING;SHARP

## 2021-01-01 ASSESSMENT — LIFESTYLE VARIABLES: SMOKING_STATUS: 1

## 2021-01-01 ASSESSMENT — PAIN DESCRIPTION - ONSET: ONSET: ON-GOING

## 2021-02-28 PROBLEM — K63.1 PERFORATED RECTUM (HCC): Status: ACTIVE | Noted: 2021-01-01

## 2021-02-28 PROBLEM — K63.1 PERFORATED BOWEL (HCC): Status: ACTIVE | Noted: 2021-01-01

## 2021-02-28 NOTE — PROGRESS NOTES
Dr Belen Ashley notified of patient pulling out ng in or-needs to be replaced /inserted left nares #16/bridle applied to ng and at the 55 lissa to nose -connected to intermittent wall suction as order per dr Belen Ashley

## 2021-02-28 NOTE — ANESTHESIA PROCEDURE NOTES
Arterial Line:    An arterial line was placed using ultrasound guidance and surface landmarks, in the OR for the following indication(s): continuous blood pressure monitoring and blood sampling needed. A 20 gauge (size), 1 and 3/4 inch (length), Arrow (type) catheter was placed, Seldinger technique used, into the right radial artery, secured by Tegaderm and tape. Anesthesia type: General    Events:  patient tolerated procedure well with no complications.   Resident/CRNA: Delon Almonte, APRN - CRNA  Preanesthetic Checklist  Completed: patient identified, IV checked, site marked, risks and benefits discussed, surgical consent, monitors and equipment checked, pre-op evaluation, timeout performed, anesthesia consent given, oxygen available and patient being monitored

## 2021-02-28 NOTE — ANESTHESIA PRE PROCEDURE
Department of Anesthesiology  Preprocedure Note       Name:  Caitlin Youngblood   Age:  36 y.o.  :  1980                                          MRN:  [id number]         Date:  2021      Surgeon: Miguel Dillard):  Alondra Amezcua MD    Procedure: LAPAROTOMY EXPLORATORY (N/A )    Medications prior to admission:   Prior to Admission medications    Medication Sig Start Date End Date Taking? Authorizing Provider   promethazine (PHENERGAN) 25 MG tablet Take 25 mg by mouth every 6 hours as needed for Nausea    Historical Provider, MD   levothyroxine (SYNTHROID) 75 MCG tablet Take 1 tablet by mouth daily Take with water on an empty stomach- wait 30 minutes before eating or taking other meds. 10/29/19   Faviola Cavazos,    oxyCODONE-acetaminophen (PERCOCET) 5-325 MG per tablet Take 1 tablet by mouth every 6 hours as needed for Pain. Historical Provider, MD   ondansetron (ZOFRAN) 4 MG tablet Take 1 tablet by mouth every 8 hours as needed for Nausea 10/13/19   Catherine Cruz, DO   dicyclomine (BENTYL) 10 MG capsule Take 1 capsule by mouth every 6 hours as needed (cramps) 10/13/19   Catherine Cruz, DO   acetaminophen (TYLENOL) 325 MG tablet Take 2 tablets by mouth every 6 hours as needed for Pain 10/13/19   Catherine Cruz, DO   pantoprazole (PROTONIX) 40 MG tablet Take 1 tablet by mouth daily (with breakfast) 19   Faviola Cavazos, DO       Current medications:    No current facility-administered medications for this visit. Current Outpatient Medications   Medication Sig Dispense Refill    promethazine (PHENERGAN) 25 MG tablet Take 25 mg by mouth every 6 hours as needed for Nausea      levothyroxine (SYNTHROID) 75 MCG tablet Take 1 tablet by mouth daily Take with water on an empty stomach- wait 30 minutes before eating or taking other meds. 30 tablet 2    oxyCODONE-acetaminophen (PERCOCET) 5-325 MG per tablet Take 1 tablet by mouth every 6 hours as needed for Pain.       ondansetron (ZOFRAN) 4 MG tablet Take 1 tablet by mouth every 8 hours as needed for Nausea 20 tablet 0    dicyclomine (BENTYL) 10 MG capsule Take 1 capsule by mouth every 6 hours as needed (cramps) 20 capsule 0    acetaminophen (TYLENOL) 325 MG tablet Take 2 tablets by mouth every 6 hours as needed for Pain 21 tablet 0    pantoprazole (PROTONIX) 40 MG tablet Take 1 tablet by mouth daily (with breakfast) 30 tablet 3     Facility-Administered Medications Ordered in Other Visits   Medication Dose Route Frequency Provider Last Rate Last Admin    piperacillin-tazobactam (ZOSYN) 4,500 mg in dextrose 5 % 100 mL IVPB (mini-bag)  4,500 mg Intravenous Q6H Steve Rivera MD           Allergies:  No Known Allergies    Problem List:    Patient Active Problem List   Diagnosis Code    History of colon polyps Z86.010       Past Medical History:        Diagnosis Date    History of colon polyps 10/25/2019    tubular adenoma    IBS (irritable bowel syndrome)        Past Surgical History:        Procedure Laterality Date    COLONOSCOPY N/A 10/25/2019    COLONOSCOPY WITH BIOPSY and cold snare performed by Erik Geller MD at [address] [place] [address] History:    Social History     Tobacco Use    Smoking status: Current Every Day Smoker     Packs/day: 0.50     Years: 15.00     Pack years: 7.50     Types: Cigarettes    Smokeless tobacco: Never Used   Substance Use Topics    Alcohol use: No     Frequency: Never                                Ready to quit: Not Answered  Counseling given: Not Answered      Vital Signs (Current): There were no vitals filed for this visit.                                            BP Readings from Last 3 Encounters:   02/28/21 (!) 165/96   10/31/19 125/81   10/25/19 113/80       NPO Status:                                                                                 BMI:   Wt Readings from Last 3 Encounters:   02/28/21 160 lb (72.6 kg)   10/31/19 161 lb 9.6 oz (73.3 kg)   10/25/19 160 lb (72.6 kg)     There is no height or weight on file to calculate BMI.    CBC:   Lab Results   Component Value Date    WBC 19.4 02/28/2021    RBC 5.03 02/28/2021    HGB 15.3 02/28/2021    HCT 46.8 02/28/2021    MCV 93.0 02/28/2021    RDW 12.8 02/28/2021     02/28/2021       CMP:   Lab Results   Component Value Date     02/28/2021    K 4.5 02/28/2021    CL 96 02/28/2021    CO2 21 02/28/2021    BUN 11 02/28/2021    CREATININE 1.20 02/28/2021    GFRAA >60 02/28/2021    LABGLOM >60 02/28/2021    GLUCOSE 168 02/28/2021    PROT 7.7 02/28/2021    CALCIUM 9.4 02/28/2021    BILITOT 0.94 02/28/2021    ALKPHOS 71 02/28/2021    AST 19 02/28/2021    ALT 10 02/28/2021       POC Tests:   Recent Labs     02/28/21  0759   POCGLU 175*   POCNA 136*   POCK 4.4   POCCL 101   POCHEMO 16.7   POCHCT 49       Coags:   Lab Results   Component Value Date    PROTIME 11.6 02/28/2021    INR 1.1 02/28/2021    APTT 24.9 02/28/2021       HCG (If Applicable): No results found for: PREGTESTUR, PREGSERUM, HCG, HCGQUANT     ABGs: No results found for: PHART, PO2ART, YIF7MKB, URF4ALH, BEART, O1KEBZTO     Type & Screen (If Applicable):  No results found for: LABABO, [address]    Anesthesia Evaluation  Patient summary reviewed and Nursing notes reviewed no history of anesthetic complications:   Airway: Mallampati: II  TM distance: >3 FB   Neck ROM: full  Mouth opening: > = 3 FB Dental: normal exam         Pulmonary:normal exam  breath sounds clear to auscultation  (+) current smoker                           Cardiovascular:Negative CV ROS          ECG reviewed  Rhythm: regular  Rate: normal                    Neuro/Psych:   (+) depression/anxiety             GI/Hepatic/Renal:   (+) PUD, liver disease:, bowel prep,          ROS comment: Abdominal pain  Diarrhea.    Endo/Other:                      ROS comment: Marijuana use +  On Narcotics for abdominal pain Abdominal:           Vascular:                                          Anesthesia Plan      general     ASA 3 - emergent Induction: intravenous and rapid sequence. MIPS: Prophylactic antiemetics administered. Anesthetic plan and risks discussed with patient. Plan discussed with CRNA.     Attending anesthesiologist reviewed and agrees with [address] Silva Mata MD   2/28/2021

## 2021-02-28 NOTE — ED NOTES
OR RN at bedside, bedside report given. Wife at bedside, updated on plan of care. Wife provided patient belongings, okay per patient.       Ector King, YOLA  02/28/21 9202

## 2021-02-28 NOTE — ED PROVIDER NOTES
[facility] ED     Emergency Department     Faculty Attestation        I performed a history and physical examination of the patient and discussed management with the resident. I reviewed the residents note and agree with the documented findings and plan of care. Any areas of disagreement are noted on the chart. I was personally present for the key portions of any procedures. I have documented in the chart those procedures where I was not present during the key portions. I have reviewed the emergency nurses triage note. I agree with the chief complaint, past medical history, past surgical history, allergies, medications, social and family history as documented unless otherwise noted below. For Physician Assistant/ Nurse Practitioner cases/documentation I have personally evaluated this patient and have completed at least one if not all key elements of the E/M (history, physical exam, and MDM). Additional findings are as noted. Vital Signs: BP: (!) 145/113  Pulse: 141  Resp: (!) 42  Temp: 96.6 °F (35.9 °C) SpO2: 97 %  PCP:  Janet Cavazos, DO    Pertinent Comments:     Patient is a 49-year-old male with history only of irritable bowel syndrome and occasional diverticulitis who presents with 3 to 4 days of increasing abdominal pain. Worse over the last 24 hours and presents tachycardic and tachypneic with a rigid abdomen. Tenderness is diffuse and positive peritoneal signs. Strong DP pulses palpated and equal bilaterally. Heart is tachycardic and lungs are tachypneic but clear. Assessment/plan: Peritoneal abdomen with abnormal vital signs and taken directly to CT scan. Start empiric antibiotic of Zosyn as well. Surgery being consulted as well    CT shows free fluid in the abdomen with possible \"proteinaceous material\". White count of 19,000. Surgery attending, Dr. Weinberg Dear, at the bedside and wishing to take to the OR.     Zosyn is already been started      EKG Interpretation    Interpreted by emergency department physician    Rhythm: Sinus rhythm, but Tachycardic  Rate: Tachycardic at 141 bpm  Axis: normal  Conduction: normal  ST Segments: no acute change  T Waves: no acute change  Q Waves: no acute change    Clinical Impression: Sinus Tachycardia. Critical Care  None    This patient was evaluated in the Emergency Department for symptoms described in the history of present illness. He/she was evaluated in the context of the global COVID-19 pandemic, which necessitated consideration that the patient might be at risk for infection with the SARS-CoV-2 virus that causes COVID-19. Institutional protocols and algorithms that pertain to the evaluation of patients at risk for COVID-19 are in a state of rapid change based on information released by regulatory bodies including the CDC and federal and state organizations. These policies and algorithms were followed during the patient's care in the ED. (Please note that portions of this note were completed with a voice recognition program. Efforts were made to edit the dictations but occasionally words are mis-transcribed.  Whenever words are used in this note in any gender, they shall be construed as though they were used in the gender appropriate to the circumstances; and whenever words are used in this note in the singular or plural form, they shall be construed as though they were used in the form appropriate to the circumstances.)    MD Blaire Jiang  Attending Emergency Medicine Physician           Anel Osborne MD  02/28/21 0040 JM Menjivar MD  02/28/21 1002

## 2021-02-28 NOTE — ED NOTES
Trauma surgery at the bedside updating pt on plan for surgery in approx 1 hour.  Consent form signed by pt     Kavita Bragg RN  02/28/21 [id number]

## 2021-02-28 NOTE — ED NOTES
Pt resting on cot, on monitor, reports less pain. Pt  Has non labored RR. Pt updated on plan of care.       Damien Giles RN  02/28/21 7114

## 2021-02-28 NOTE — BRIEF OP NOTE
Brief Postoperative Note      Patient: Luther Lainez  YOB: 1980  MRN: [id number]    Date of Procedure: 2/28/2021    Pre-Op Diagnosis: PERFORATED BOWEL    Post-Op Diagnosis: Perforated Rectum with feculent peritonitis        Procedure(s):  EXPLORATORY LAPAROTOMY, MOBILIZATION OF FALSIFORM LIGAMENT, CATTELL-BRESCH MANEUVER, DINA MANEUVER, CONTROL OF PELVIC HEMORRHAGE, GREENE'S PROCEDURE, KOCHERIZATION OF DUODENUM, CREATION OF END COLOSTOMY    Surgeon(s):  Patric Dunne MD    Assistant:  Resident: Bernadine Roe MD; Jessica Loco DO    Anesthesia: General    Estimated Blood Loss (mL): 886 mL    Complications: None    Specimens:   ID Type Source Tests Collected by Time Destination   A : SIGMOID COLON Tissue Sigmoid Colon SURGICAL PATHOLOGY Patric Dunne MD 2/28/2021 1205        Implants:  * No implants in log *      Drains:   NG/OG/NJ/NE Tube 16 fr Left nostril (Active)       Colostomy Descending/sigmoid (Active)   Stomal Appliance 1 piece 02/28/21 1346   Stoma  Assessment Red 02/28/21 1346       Urethral Catheter Intermittent 16 fr (Active)   Catheter Indications Perioperative use in selected surgeries including but not limited to urologic, pelvic or need for intraoperative monitoring of urinary output due to prolonged surgery, large volume infusion or need for diuretic therapy in surgery 02/28/21 1346   Site Assessment No urethral drainage 02/28/21 1346   Urine Color Lauren 02/28/21 1346   Urine Appearance Clear 02/28/21 1346       [REMOVED] NG/OG/NJ/NE Tube Nasogastric 18 fr Left mouth (Removed)       Findings: large amount of hemoperitoneum with finding of perforated rectum with feculent peritonitis, wound class 4     Electronically signed by Jessica Loco DO on 2/28/2021 at 2:36 PM

## 2021-02-28 NOTE — ED PROVIDER NOTES
Substance and Sexual Activity    Alcohol use: No     Frequency: Never    Drug use: Yes     Types: Marijuana     Comment: last night    Sexual activity: Not on file   Lifestyle    Physical activity     Days per week: Not on file     Minutes per session: Not on file    Stress: Not on file   Relationships    Social connections     Talks on phone: Not on file     Gets together: Not on file     Attends Hoahaoism service: Not on file     Active member of club or organization: Not on file     Attends meetings of clubs or organizations: Not on file     Relationship status: Not on file    Intimate partner violence     Fear of current or ex partner: Not on file     Emotionally abused: Not on file     Physically abused: Not on file     Forced sexual activity: Not on file   Other Topics Concern    Not on file   Social History Narrative    Not on file       Family History   Problem Relation Age of Onset    Emphysema Mother 61        lifelong smoker    Crohn's Disease Mother     Heart Disease Father         had a triple bypass @ 39 yoa    High Blood Pressure Father        Allergies:  Patient has no known allergies. Home Medications:  Prior to Admission medications    Medication Sig Start Date End Date Taking? Authorizing Provider   promethazine (PHENERGAN) 25 MG tablet Take 25 mg by mouth every 6 hours as needed for Nausea   Yes Historical Provider, MD   oxyCODONE-acetaminophen (PERCOCET) 5-325 MG per tablet Take 1 tablet by mouth every 6 hours as needed for Pain.    Yes Historical Provider, MD   ondansetron (ZOFRAN) 4 MG tablet Take 1 tablet by mouth every 8 hours as needed for Nausea 10/13/19  Yes Catherine Lincoln,    dicyclomine (BENTYL) 10 MG capsule Take 1 capsule by mouth every 6 hours as needed (cramps) 10/13/19  Yes Catherine Lincoln DO   acetaminophen (TYLENOL) 325 MG tablet Take 2 tablets by mouth every 6 hours as needed for Pain 10/13/19  Yes Shantal Banerjee 1721, DO pantoprazole (PROTONIX) 40 MG tablet Take 1 tablet by mouth daily (with breakfast) 9/25/19  Yes Faviola Cavazos, DO   levothyroxine (SYNTHROID) 75 MCG tablet Take 1 tablet by mouth daily Take with water on an empty stomach- wait 30 minutes before eating or taking other meds. 10/29/19   Faviola Cavazos, DO       REVIEW OF SYSTEMS    (2-9 systems for level 4, 10 or more for level 5)      Review of Systems   Constitutional: Positive for chills, fatigue and fever. HENT: Negative for sinus pressure, sinus pain and sore throat. Eyes: Negative for photophobia and visual disturbance. Respiratory: Positive for shortness of breath. Negative for cough and wheezing. Cardiovascular: Negative for chest pain. Gastrointestinal: Positive for abdominal pain, constipation, nausea and vomiting. Negative for blood in stool and diarrhea. Endocrine: Negative for polyphagia and polyuria. Genitourinary: Negative for decreased urine volume, dysuria, flank pain and urgency. Musculoskeletal: Negative for back pain, neck pain and neck stiffness. Skin: Negative for rash and wound. Neurological: Negative for dizziness, tremors, weakness and headaches. Psychiatric/Behavioral: Negative for confusion. PHYSICAL EXAM   (up to 7 for level 4, 8 or more for level 5)      INITIAL VITALS:   BP (!) 165/96   Pulse 141   Temp 96.6 °F (35.9 °C) (Temporal)   Resp (!) 42   Ht 5' 11\" (1.803 m)   Wt 160 lb (72.6 kg)   SpO2 97%   BMI 22.32 kg/m²     Physical Exam  Constitutional:       Appearance: He is ill-appearing and toxic-appearing. Cardiovascular:      Rate and Rhythm: Tachycardia present. Heart sounds: No murmur. No friction rub. No gallop. Pulmonary:      Breath sounds: No wheezing, rhonchi or rales. Comments: Tachypnea  Chest:      Chest wall: No tenderness.    Abdominal:      Comments: Abdomen rigid, diffusely tender, involuntary guarding and severe rebound tenderness present   Skin: Coloration: Skin is not mottled. Findings: No erythema or rash. Neurological:      General: No focal deficit present. Mental Status: He is alert and oriented to person, place, and time.          DIFFERENTIAL  DIAGNOSIS     PLAN (LABS / IMAGING / EKG):  Orders Placed This Encounter   Procedures    Culture, Urine    COVID-19, Rapid    Culture, Blood 1    Culture, Blood 1    CT ABDOMEN PELVIS W IV CONTRAST Additional Contrast? None    Comprehensive Metabolic Panel w/ Reflex to MG    Lipase    TSH with Reflex    Lactic Acid, Plasma    PROTIME-INR    APTT    Urinalysis, reflex to microscopic    Hemoglobin and hematocrit, blood    SODIUM (POC)    POTASSIUM (POC)    CHLORIDE (POC)    CALCIUM, IONIC (POC)    CBC Auto Differential    Fecal lactoferrin    C-Reactive Protein    Sedimentation Rate    Inpatient consult to General Surgery    POC Blood Gas and Chemistry    Venous Blood Gas, POC    Creatinine W/GFR Point of Care    Lactic Acid, POC    POCT Glucose    Anion Gap (Calc) POC    EKG 12 Lead       MEDICATIONS ORDERED:  Orders Placed This Encounter   Medications    0.9 % sodium chloride bolus    fentaNYL (SUBLIMAZE) injection 50 mcg    iopamidol (ISOVUE-370) 76 % injection 75 mL    fentaNYL (SUBLIMAZE) injection 50 mcg    DISCONTD: piperacillin-tazobactam (ZOSYN) 3,375 mg in dextrose 5 % 50 mL IVPB (mini-bag)     Order Specific Question:   Antimicrobial Indications     Answer:   Intra-Abdominal Infection    lactated ringers infusion 1,000 mL    piperacillin-tazobactam (ZOSYN) 4,500 mg in dextrose 5 % 100 mL IVPB (mini-bag)     Order Specific Question:   Antimicrobial Indications     Answer:   Intra-Abdominal Infection    HYDROmorphone (DILAUDID) injection 1 mg    fentaNYL (SUBLIMAZE) injection 50 mcg       DDX: Perforated hollow viscus, diverticulitis, small bowel obstruction, ischemic bowel    DIAGNOSTIC RESULTS / EMERGENCY DEPARTMENT COURSE / MDM   LAB RESULTS: Microscopic exam not performed based on chemical results unless requested in original order.    Hemoglobin and hematocrit, blood   Result Value Ref Range    POC Hemoglobin 16.7 13.5 - 17.5 g/dL    POC Hematocrit 49 41 - 53 %   SODIUM (POC)   Result Value Ref Range    POC Sodium 136 (L) 138 - 146 mmol/L   POTASSIUM (POC)   Result Value Ref Range    POC Potassium 4.4 3.5 - 4.5 mmol/L   CHLORIDE (POC)   Result Value Ref Range    POC Chloride 101 98 - 107 mmol/L   CALCIUM, IONIC (POC)   Result Value Ref Range    POC Ionized Calcium 1.02 (L) 1.15 - 1.33 mmol/L   CBC Auto Differential   Result Value Ref Range    WBC 19.4 (H) 3.5 - 11.3 k/uL    RBC 5.03 4.21 - 5.77 m/uL    Hemoglobin 15.3 13.0 - 17.0 g/dL    Hematocrit 46.8 40.7 - 50.3 %    MCV 93.0 82.6 - 102.9 fL    MCH 30.4 25.2 - 33.5 pg    MCHC 32.7 28.4 - 34.8 g/dL    RDW 12.8 11.8 - 14.4 %    Platelets 106 403 - 663 k/uL    MPV 10.1 8.1 - 13.5 fL    NRBC Automated 0.0 0.0 per 100 WBC    Differential Type NOT REPORTED     Seg Neutrophils 85 (H) 36 - 65 %    Lymphocytes 10 (L) 24 - 43 %    Monocytes 4 3 - 12 %    Eosinophils % 0 (L) 1 - 4 %    Basophils 0 0 - 2 %    Immature Granulocytes 1 (H) 0 %    Segs Absolute 16.48 (H) 1.50 - 8.10 k/uL    Absolute Lymph # 1.97 1.10 - 3.70 k/uL    Absolute Mono # 0.72 0.10 - 1.20 k/uL    Absolute Eos # 0.05 0.00 - 0.44 k/uL    Basophils Absolute 0.04 0.00 - 0.20 k/uL    Absolute Immature Granulocyte 0.12 0.00 - 0.30 k/uL    WBC Morphology NOT REPORTED     RBC Morphology NOT REPORTED     Platelet Estimate NOT REPORTED    Sedimentation Rate   Result Value Ref Range    Sed Rate 31 (H) 0 - 15 mm   Venous Blood Gas, POC   Result Value Ref Range    pH, Alejandro 7.384 7.320 - 7.430    pCO2, Alejandro 40.6 (L) 41.0 - 51.0 mm Hg    pO2, Alejandro 27.0 (L) 30.0 - 50.0 mm Hg    HCO3, Venous 24.2 22.0 - 29.0 mmol/L    Total CO2, Venous 26 23.0 - 30.0 mmol/L    Negative Base Excess, Alejandro 1 0.0 - 2.0    Positive Base Excess, Alejandro NOT REPORTED 0.0 - 3.0 O2 Sat, Alejandro 49 (L) 60.0 - 85.0 %    O2 Device/Flow/% NOT REPORTED     Abdiel Test NOT REPORTED     Sample Site NOT REPORTED     Mode NOT REPORTED     FIO2 NOT REPORTED     Pt Temp NOT REPORTED     POC pH Temp NOT REPORTED     POC pCO2 Temp NOT REPORTED mm Hg    POC pO2 Temp NOT REPORTED mm Hg   Creatinine W/GFR Point of Care   Result Value Ref Range    POC Creatinine 1.33 (H) 0.51 - 1.19 mg/dL    GFR Comment >60 >60 mL/min    GFR Non-African American 60 (L) >60 mL/min    GFR Comment         Lactic Acid, POC   Result Value Ref Range    POC Lactic Acid 2.76 (H) 0.56 - 1.39 mmol/L   POCT Glucose   Result Value Ref Range    POC Glucose 175 (H) 74 - 100 mg/dL   Anion Gap (Calc) POC   Result Value Ref Range    Anion Gap 11 7 - 16 mmol/L         RADIOLOGY:  Ct Abdomen Pelvis W Iv Contrast Additional Contrast? None    Result Date: 2/28/2021 EXAMINATION: CT OF THE ABDOMEN AND PELVIS WITH CONTRAST 2/28/2021 8:00 am TECHNIQUE: CT of the abdomen and pelvis was performed with the administration of intravenous contrast. Multiplanar reformatted images are provided for review. Dose modulation, iterative reconstruction, and/or weight based adjustment of the mA/kV was utilized to reduce the radiation dose to as low as reasonably achievable. COMPARISON: CT abdomen and pelvis performed 10/13/2019. HISTORY: ORDERING SYSTEM PROVIDED HISTORY: Severe abdominal pain, tachycardia TECHNOLOGIST PROVIDED HISTORY: Severe abdominal pain, tachycardia Decision Support Exception->Emergency Medical Condition (MA) FINDINGS: Lower Chest: The lung bases are without consolidation or effusion. The visualized cardiac structures are unremarkable. Organs: Liver and spleen are normal size and overall attenuation. Gallbladder is distended. Pancreas and adrenal glands are unremarkable. The kidneys are without obstructive uropathy. The ureters are not dilated. The urinary bladder is unremarkable. GI/Bowel: The stomach is unremarkable. Loops of small bowel are normal in caliber without evidence for obstruction. The colon contains air and fecal residue. There are scattered uncomplicated diverticula. The appendix is normal.  There is free fluid adjacent to the liver and spleen. There is also free fluid in the pericolic gutters extending into the pelvis. This fluid is isodense to muscle and may contain proteinaceous material of uncertain origin. Pelvis: Prostate gland and seminal vesicles are unremarkable. Peritoneum/Retroperitoneum: The psoas muscles are symmetric. The abdominal aorta is normal in caliber. The inferior vena cava is unremarkable. There is no retroperitoneal or mesenteric adenopathy. Bones/Soft Tissues: The extra-abdominal soft tissues are unremarkable. There is no acute osseous abnormality. Free fluid adjacent to the liver and spleen, in the pericolic gutters, and within the pelvis that is isodense to muscle and may contain proteinaceous material.  This is of uncertain etiology. Distended gallbladder and correlation with sonography may be helpful in further characterization. Uncomplicated colonic diverticulosis. EKG  EKG Interpretation    Interpreted by me    Rhythm: Sinus tachycardia  Rate: 141 bpm  Axis: normal  Ectopy: none  Conduction: , QRS 66, QTc 407  ST Segments: no acute change  T Waves: no acute change  Q Waves: none    Clinical Impression: Sinus tachycardia, baseline motion artifact, nonspecific EKG    All EKG's are interpreted by the Emergency Department Physician who either signs or Co-signs this chart in the absence of a cardiologist.      IMPRESSION/ ED Course: Acutely ill appearing 59-year-old male presenting with severe abdominal pain with associated constipation, nausea. Patient with rigid, peritoneal abdomen on exam.  Patient tachycardic, tachypneic on arrival.  .  Point-of-care labs with elevated lactic acid, electrolytes unremarkable, VBG unremarkable. Hemoglobin hematocrit within normal limits. Patient taken to CT scanner and found to have free fluid in the abdomen paracolic gutters, surrounding liver and spleen. No obvious free air. Treated with multiple IV fluid boluses, multiple rounds of analgesics, and Zosyn in emergency department    Based on exam findings and free fluid in abdomen on CT scan there is a significant concern for bowel perforation. General surgery was consulted and evaluated patient at bedside. On reevaluation patient's heart rate has slightly improved to 120s. Patient still with severe abdominal pain. We will continue fluid resuscitation. Patient being taken to operating room for exploratory laparotomy.     PROCEDURES:  None    CONSULTS:  IP CONSULT TO GENERAL SURGERY    CRITICAL CARE:  Please see attending note    FINAL IMPRESSION 1. Acute abdomen          DISPOSITION / PLAN     DISPOSITION Decision To Admit 02/28/2021 08:34:20 AM      PATIENT REFERRED TO:  No follow-up provider specified.     DISCHARGE MEDICATIONS:  New Prescriptions    No medications on file       Manish Yen DO  Emergency Medicine Resident    (Please note that portions of thisnote were completed with a voice recognition program.  Efforts were made to edit the dictations but occasionally words are mis-transcribed.)       Manish Yen DO  Resident  02/28/21 1016

## 2021-02-28 NOTE — ED NOTES
Dr. Kash Mcginnis notified patient received 3.375 grams of zosyn. Okay to complete per resident. Notified admitting team paged if the rest of the 4.5g is needed.      Boby Delcid RN  02/28/21 0754

## 2021-02-28 NOTE — ED NOTES
Miguel Mckeon paged, notified 3.375G of zosyn is near complete in the next few minutes. Requesting if the difference of 1 g of zosyn is needed to please order it separately.       Rafiq Cutler RN  02/28/21 [id number]

## 2021-02-28 NOTE — OP NOTE
Operative Note      Patient: Ashwini Black  YOB: 1980  MRN: [id number]    Date of Procedure: 2/28/2021    Pre-Op Diagnosis: PERFORATED BOWEL    Post-Op Diagnosis: PERFORATED RECTAL MASS- CONCERN FOR PERFORATED DIVERTICULITIS VS PERFORATED RECTAL CANCER       Procedure(s):  1. EXPLORATORY LAPAROTOMY  2. TAKEDOWN OF FALCIFORM LIGAMENT  3. CATTELL-BRASCH MANEUVER  4. DINA MANEUVER  5. CONTROL OF PELVIC HEMORRHAGE   6. BELLA'S PROCEDURE  7. KOCHERIZATION OF DUODENUM  8. CREATION OF END COLOSTOMY    Surgeon(s):  Sandra Olmos MD    Assistant:   Resident: Mitzi Quinonez MD; Ann Norman DO    Anesthesia: General    Estimated Blood Loss (mL): 802 mL    Complications: None    Specimens:   ID Type Source Tests Collected by Time Destination   A : SIGMOID COLON Tissue Sigmoid Colon SURGICAL PATHOLOGY Sandra Olmos MD 2/28/2021 1205        Implants:  * No implants in log *      Drains:   NG/OG/NJ/NE Tube 16 fr Left nostril (Active)       Colostomy Descending/sigmoid (Active)   Stomal Appliance 1 piece 02/28/21 1346   Stoma  Assessment Red 02/28/21 1346       Urethral Catheter Intermittent 16 fr (Active)   Catheter Indications Perioperative use in selected surgeries including but not limited to urologic, pelvic or need for intraoperative monitoring of urinary output due to prolonged surgery, large volume infusion or need for diuretic therapy in surgery 02/28/21 1346   Site Assessment No urethral drainage 02/28/21 1346   Urine Color Lauren 02/28/21 1346   Urine Appearance Clear 02/28/21 1346       [REMOVED] NG/OG/NJ/NE Tube Nasogastric 18 fr Left mouth (Removed)       History: 59-year-old male who presented to the emergency department with chief complaint of a 2-day history of abdominal pain. Patient underwent CT imaging which was concerning for a large amount of fluid surrounding his liver and spleen. Patient denied any previous history of trauma or injury to his abdomen.   Patient only complained of abdominal pain with mild nausea. On physical exam patient was found to be peritoneal.  Decision was made to take the patient to the operating room. Informed consent was obtained at bedside. All risks and possible complications were discussed in great detail. Findings: Large amount of hemoperitoneum with finding of perforated rectum with feculent peritonitis, wound class 4     Detailed Description of Procedure:   Patient was seen and examined in the emergency department. Patient's imaging was reviewed. Physical exam was consistent with peritonitis. Decision was made to take the patient to the operating room for exploratory laparotomy. All risks and possible complications were discussed with the patient. Informed consent was obtained at bedside. The patient was then transferred to the operating room on the [facility]. The patient was then transferred to the operating room table in the supine position. The patient then underwent endotracheal intubation and general anesthesia via the anesthesia team.  A formal timeout was called verifying the correct patient, positioning, procedure to be performed, and preoperative antibiotics. The patient did receive Zosyn preoperatively. The patient's abdomen was then prepped and draped in the usual sterile fashion. A #10 blade scalpel was used to make a midline incision extending from the xiphoid to the suprapubic region. Bovie cautery was then used to further dissect down through the subcutaneous tissue in the midline abdominal fascia. Upon entrance to the abdomen we were met with a large amount of hemoperitoneum. The falciform ligament was taken down using Bovie cautery. Each end of the ligament was suture-ligated using 2-0 silk sutures. The falciform was taken down over the entire dome of the liver. The abdomen was then immediately packed in all 4 quadrants using lap sponges.   The anesthesia team was then able to catch up, they placed an arterial line at that time. We then removed the laparotomy sponges. We first began examining the liver. There was no obvious signs of liver laceration or injury to the liver. We then moved to the left upper quadrant. The spleen was examined with no sign of bleeding or injury. The left lower quadrant was then examined. The laparotomy pads were removed with sign of a moderate amount of left retroperitoneal hematoma. We then examined the right lower quadrant. Upon removal of the laparotomy pads there was bright red blood in the right paracolic gutter. This area was washed out with warm normal saline. Upon further examination the right retroperitoneum had a large amount of retroperitoneal hematoma. We then used Bovie cautery to mobilize the white line of Toldt. We then performed a Monroy Brasch maneuver mobilizing the cecum to the midline. Upon mobilization of the right colon the right retroperitoneal hematoma was examined. The kidney was mobilized to the midline with no evidence of perinephric fat involvement thus confirming the kidney was not the source of the hematoma. We then continued a Kocher maneuver. We fully kocherized the entire duodenum with no finding of perforated ulcer or bleeding of any sort coming from the duodenum. The posterior aspect of the pancreas was examined with no evidence of necrotizing pancreatitis or bleeding. The lesser sac of the abdomen was opened. There was no blood noted within the lesser sac. We were able to fully visualize the posterior aspect of the stomach with no sign of bleeding. The splenic artery was visualized with no evidence of perforated aneurysm. We then continue to explore the abdomen for the source of bleeding. We performed a Mattix maneuver to examine the left retroperitoneal area after completing the Mattix maneuver there was no source of bleeding noted from the left retroperitoneum. We then examined the rectum.   Upon palpation of the superior aspect of the rectum there was a large palpable rectal mass. Upon further retraction and mobilization of the rectum along the right lateral aspect of the superior aspect of the rectum there was feculent peritonitis noted with stool leaking through a perforated area of the superior aspect of the rectum. This area was firm and very inflamed. We then noted bleeding from the left lateral aspect of the superior part of the rectum. We mobilized the white line of Toldt from the superior aspect just proximal to the pelvic inlet. Upon doing this a large amount of old hematoma was noted with active bleeding from the superior rectal artery. This area was grasped with a right angle clamp and suture ligated using 3-0 Vicryl. The inflamed area was very concerning for possible perforated colon cancer versus a perforated diverticulitis. We then mobilized the posterior aspect of the rectum using a LigaSure device. Hemostasis was achieved using the LigaSure. The distal aspect of the sigmoid colon was mobilized along the white line of Toldt. A window was made in the mesentery and a stapler device was used to transect at the distal sigmoid colon. We then continued to mobilize the posterior aspect of the rectum until we were able to gain enough distal length to perform a stapled resection. After gaining enough posterior mobilization of the rectum we used a contour stapler to resect just distal to the perforated mass. After the mass was resected we were able to open the mass on the back table with no evidence of obvious colonic cancerous mass, however there was a large amount of stool burden noted throughout the valves of the rectum which was concerning for obstruction causing a perforation. The tissue did feel very thick and inflamed. We then returned to the pelvis. This area was copiously irrigated with 4 L of warm normal saline until return of clear liquid fluid.   The decision was made to create an end colostomy due to the Dr. Tray Robertson was present for the entire procedure.     Electronically signed by Storm Bond DO on 2/28/2021 at 2:37 PM

## 2021-02-28 NOTE — ANESTHESIA POSTPROCEDURE EVALUATION
Department of Anesthesiology  Postprocedure Note    Patient: Vel Watson  MRN: [id number]  YOB: 1980  Date of evaluation: 2/28/2021  Time:  3:05 PM     Procedure Summary     Date: 02/28/21 Room / Location: [address]    Anesthesia Start: 8170 Anesthesia Stop: 6133    Procedure: EXPLORATORY LAPAROTOMY, MOBILIZATION OF FALSIFORM LIGAMENT, CATTELL-BRESCH MANEUVER, DINA MANEUVER, CONTROL OF PELVIC HEMORRHAGE, GREENE'S PROCEDURE, KOCHERIZATION OF DUODENUM, CREATION OF END COLOSTOMY (N/A ) Diagnosis: (PERFORATED BOWEL)    Surgeons: Shellie Kim MD Responsible Provider: Henry Murphy MD    Anesthesia Type: general ASA Status: 3 - Emergent          Anesthesia Type: general    Martine Phase I: Martine Score: 9    Martine Phase II:      Last vitals: Reviewed and per EMR flowsheets.        Anesthesia Post Evaluation    Patient location during evaluation: PACU  Patient participation: complete - patient participated  Level of consciousness: awake  Pain score: 3  Airway patency: patent  Nausea & Vomiting: no vomiting and no nausea  Complications: no  Cardiovascular status: blood pressure returned to baseline  Respiratory status: acceptable  Hydration status: euvolemic

## 2021-02-28 NOTE — H&P
General Surgery H&P    PATIENT NAME: Kieran Nguyen   YOB: 1980    ADMISSION DATE: 2021  7:38 AM      TODAY'S DATE: 2021    CHIEF COMPLAINT:  Abdominal pain      HISTORY OF PRESENT ILLNESS:  The patient is a 36 y.o. male  who presents with 3d h/o progressively worsening abdominal pain. Pain acutely started 3 days ago. Has been constipated w/ last bm 3 days ago, nauseous w/out emesis. Due to worsening pain pt came to ED for evaluation. No fever, chills. Denies sob, cp. No recent sick contacts. Denies hematochezia/melena. Has h/o chronic abdominal pain. Had colonoscopy  w/ removal of polyp (benign) and diverticulosis. Thinks mother had IBD, she is . In ED pt had wbc 19K, ct abd/pelv w/ free fluid, peritoneal abdomen. Past Medical History:        Diagnosis Date    History of colon polyps 10/25/2019    tubular adenoma    IBS (irritable bowel syndrome)        Past Surgical History:        Procedure Laterality Date    COLONOSCOPY N/A 10/25/2019    COLONOSCOPY WITH BIOPSY and cold snare performed by Radha Miller MD at [facility] END[facility]       Medications Prior to Admission:   Not in a hospital admission. Allergies:  Patient has no known allergies.     Social History:   Social History     Socioeconomic History    Marital status:      Spouse name: Not on file    Number of children: Not on file    Years of education: Not on file    Highest education level: Not on file   Occupational History    Not on file   Social Needs    Financial resource strain: Not on file    Food insecurity     Worry: Not on file     Inability: Not on file   [employer] needs     Medical: Not on file     Non-medical: Not on file   Tobacco Use    Smoking status: Current Every Day Smoker     Packs/day: 0.50     Years: 15.00     Pack years: 7.50     Types: Cigarettes    Smokeless tobacco: Never Used   Substance and Sexual Activity    Alcohol use: No     Frequency: Never    Drug use: Yes     Types: Marijuana     Comment: last night    Sexual activity: Not on file   Lifestyle    Physical activity     Days per week: Not on file     Minutes per session: Not on file    Stress: Not on file   Relationships    Social connections     Talks on phone: Not on file     Gets together: Not on file     Attends [employer]ism service: Not on file     Active member of club or organization: Not on file     Attends meetings of clubs or organizations: Not on file     Relationship status: Not on file    Intimate partner violence     Fear of current or ex partner: Not on file     Emotionally abused: Not on file     Physically abused: Not on file     Forced sexual activity: Not on file   Other Topics Concern    Not on file   Social History Narrative    Not on file       Family History:       Problem Relation Age of Onset    Emphysema Mother 61        lifelong smoker    Crohn's Disease Mother     Heart Disease Father         had a triple bypass @ 39 yoa    High Blood Pressure Father        REVIEW OF SYSTEMS:    General: Denies fever, chills, weight loss. Decreased energy   HEENT: Denies sore throat, sinus problems, allergic rhinosinusitis  Cardiovascular: Denies chest pain, palpitations, orthopnea  Pulmonary: Denies cough, shortness of breath  GI:  See hpi  : Denies polyuria, dysuria, hematuria  Endocrine: Denies diabetes, thyroid problems.   Hem/Onc: Denies hx of bleeding disorders, Denies hx of cancer   Neurological: Denies h/o CVA, TIA  Skin: Denies rashes, ulcers  Musculoskeletal: Denies muscle, joint, back pain      PHYSICAL EXAM:    VITALS:  BP (!) 165/96   Pulse 141   Temp 96.6 °F (35.9 °C) (Temporal)   Resp (!) 42   Ht 5' 11\" (1.803 m)   Wt 160 lb (72.6 kg)   SpO2 97%   BMI 22.32 kg/m²   INTAKE/OUTPUT:     Intake/Output Summary (Last 24 hours) at 2/28/2021 0956  Last data filed at 2/28/2021 0926  Gross per 24 hour   Intake 1050 ml   Output    Net 1050 ml       CONSTITUTIONAL: mod distress, ill appearing, diaphoretic   HEAD: atraumatic, normocephalic  EYES: sclera clear, pupils equal and reactive to light  ENT: ears are symmetric, nares patent   HEENT: moist mucous membranes  NECK: Supple, symmetrical, trachea midline  LUNGS: equal and symmetric chest rise/fall  CARDIOVASCULAR: +S1/S2  ABDOMEN: rigid, diffusely ttp, w/ rebound and guarding.    MUSCULOSKELETAL: full range of motion noted  NEUROLOGIC: Awake, alert, oriented to name, place and time  EXTREMITIES: peripheral pulses normal, no pedal edema, no calf tenderness  SKIN: normal coloration and turgor      CBC with Differential:    Lab Results   Component Value Date    WBC 19.4 02/28/2021    RBC 5.03 02/28/2021    HGB 15.3 02/28/2021    HCT 46.8 02/28/2021     02/28/2021    MCV 93.0 02/28/2021    MCH 30.4 02/28/2021    MCHC 32.7 02/28/2021    RDW 12.8 02/28/2021    LYMPHOPCT 10 02/28/2021    MONOPCT 4 02/28/2021    BASOPCT 0 02/28/2021    MONOSABS 0.72 02/28/2021    LYMPHSABS 1.97 02/28/2021    EOSABS 0.05 02/28/2021    BASOSABS 0.04 02/28/2021    DIFFTYPE NOT REPORTED 02/28/2021     BMP:    Lab Results   Component Value Date     02/28/2021    K 4.5 02/28/2021    CL 96 02/28/2021    CO2 21 02/28/2021    BUN 11 02/28/2021    LABALBU 4.3 02/28/2021    CREATININE 1.20 02/28/2021    CALCIUM 9.4 02/28/2021    GFRAA >60 02/28/2021    LABGLOM >60 02/28/2021    GLUCOSE 168 02/28/2021     Hepatic Function Panel:    Lab Results   Component Value Date    ALKPHOS 71 02/28/2021    ALT 10 02/28/2021    AST 19 02/28/2021    PROT 7.7 02/28/2021    BILITOT 0.94 02/28/2021    BILIDIR <0.08 03/20/2015    IBILI CANNOT BE CALCULATED 03/20/2015    LABALBU 4.3 02/28/2021     PT/INR:    Lab Results   Component Value Date    PROTIME 11.6 02/28/2021    INR 1.1 02/28/2021     U/A:    Lab Results   Component Value Date    NITRITE neg 09/25/2019    COLORU YELLOW 10/14/2019    PROTEINU NEGATIVE 10/14/2019    PHUR 7.5 10/14/2019    WBCUA 0 TO 2 10/14/2019 spleen. Tahir Lapping is also   free fluid in the pericolic gutters extending into the pelvis.  This fluid is   isodense to muscle and may contain proteinaceous material of uncertain origin.       Pelvis: Prostate gland and seminal vesicles are unremarkable.       Peritoneum/Retroperitoneum: The psoas muscles are symmetric.  The abdominal   aorta is normal in caliber.  The inferior vena cava is unremarkable.  There   is no retroperitoneal or mesenteric adenopathy.       Bones/Soft Tissues: The extra-abdominal soft tissues are unremarkable. Tahir Lapping   is no acute osseous abnormality.           Impression   Free fluid adjacent to the liver and spleen, in the pericolic gutters, and   within the pelvis that is isodense to muscle and may contain proteinaceous   material.  This is of uncertain etiology.       Distended gallbladder and correlation with sonography may be helpful in   further characterization.       Uncomplicated colonic diverticulosis.               ASSESSMENT   Active Problems:    * No active hospital problems. *  Resolved Problems:    * No resolved hospital problems. *      PLAN    1. Admit under general surgery  2. Plan for OR exploratory laparotomy. Risk, benefits and alternatives discussed. All questions answered informed consent signed and placed on chart  3. Continue resuscitation w/ IVF  4. Abx: zosyn   5.  Additional orders post op      ------------------------------------------------------------------  De Awad

## 2021-02-28 NOTE — ED NOTES
Pt to ed from home, pt c/o abdominal pain x2 days, progressively worsening. Pt reports nausea. Patient denies any comiting. Pt c/o constipation the past few days, unable to have BM. Patient is aox3. Pt reports sob when the pain increases, denies chest pain. Patient denies blood stool.       Damien Giles RN  02/28/21 1991

## 2021-03-01 NOTE — PROGRESS NOTES
PROGRESS NOTE          PATIENT NAME: Sage Mayes  MEDICAL RECORD NO. [id number]  DATE: 3/1/2021  SURGEON: Esau Garcia   PRIMARY CARE PHYSICIAN: Barbra Morris, DO    HD: # 1    ASSESSMENT    Patient Active Problem List   Diagnosis    History of colon polyps    Perforated bowel ([address]    Perforated rectum [facility])       MEDICAL DECISION MAKING AND PLAN    NEUROLOGIC:  PROBLEMS:   Anxiety   PLAN:   Ativan 1 mg IV q4 PRN, continue to monitor     SEDATION/ANALGESIA:  fentanyl -  PCA 20 mcg/ml    CARDIOVASCULAR:  PROBLEMS:   Tachycardia   PLAN:   EKG - sinus tachycardia    Suspect volume depletion, IVF bolus, will continue to monitor     PULMONARY:  PROBLEMS:   Tachypnea   Saturating well on NC 2 L   PLAN:   CXR ordered - perihilar and bibasilar opacities noted. atelectasis vs vascular crowding     RENAL/FLUID/ELECTROLYTE:  PROBLEMS:   Hypophosphatemia   PLAN:   Replaced phos - continue to monitor     GI/NUTRITION:  PROBLEMS:   NPO  Until ostomy output   PLAN:   Continue NPO     ID:  PROBLEMS:   WBC 15.6 (19.4)    Febrile at 100.6    Continue to monitor   PLAN:   Continue Zosyn     HEMATOLOGIC:  PROBLEMS:   Hgb stable 11.8 (11.5)   PLAN:   Continue to monitor     ENDOCRINE:  PROBLEMS:   N/a   PLAN:   n/a    OTHER:  Patient continues to have tachycardia, suspect anxiety, intermittently combative. PROPHYLAXIS:   Stress ulcer: none as of now    VTE: lovenox    DISPOSITION:   Continue to monitor s/p surgical intervention         SUBJECTIVE    Sage Mayes is is unchanged since yesterday. Still having abdominal pain, still having mild chest pain. Continues to be intermittently combative.        OBJECTIVE  VITALS: Temp: Temp: 100.6 °F (38.1 °C)Temp  Av.6 °F (37.6 °C)  Min: 98.9 °F (37.2 °C)  Max: 100.6 °F (90.0 °C) BP Systolic (56XEN), OOM:286 , Min:131 , RVF:228   Diastolic (61RQY), QE, Min:85, Max:111   Pulse Pulse  Av.8  Min: 100  Max: 137 Resp Resp  Av.1  Min: 18  Max: 37 Pulse ox SpO2

## 2021-03-01 NOTE — PROGRESS NOTES
Patient heart rate repeatedly jumping to the mid to high 140s then coming back down to the low to mid 100s, Dr Gavino Fragoso notified, came and seen patient.

## 2021-03-01 NOTE — PROGRESS NOTES
Writer has been in contact with provider throughout shift for increased heart rate, safety concerns as well as pain not controlled. Patient has received several new ordered today and writer did complete all of them as ordered. Provider has been bed side several times. Patient is currently in soft restarints for safety reason like getting out of bed, pulling lines and not being re directed. Wife is at bedside . Call light in reach.

## 2021-03-01 NOTE — PROGRESS NOTES
Physician Progress Note      PATIENTTraeduardo Adams  CSN #:                  [id number]  :                       1980  ADMIT DATE:       2021 7:38 AM  DISCH DATE:  RESPONDING  PROVIDER #:        Ashley Turner DO          QUERY TEXT:    Pt admitted with perforate rectum with feculent peritonitis. Pt noted to have   WBC 19.4, Lactic acid 2.6, , and RR 37 on admission. If possible, please   document in the progress notes and discharge summary if you are evaluating   and /or treating any of the following: The medical record reflects the following:  Risk Factors: perforated rectum  Clinical Indicators:  WBC 19.4, Lactic acid 2.6, T35.9, , RR 37  Per OP Note: large amount of hemoperitoneum with finding of perforated rectum   with feculent peritonitis  Treatment: IV Zosyn, IV fluids, Surgery    Thank you, Nazia Lugo RN, CDS. Please call with any questions [phone]. Options provided:  -- Sepsis, present on admission  -- No Sepsis, peritonitis only  -- Sepsis was ruled out  -- Other - I will add my own diagnosis  -- Disagree - Not applicable / Not valid  -- Disagree - Clinically unable to determine / Unknown  -- Refer to Clinical Documentation Reviewer    PROVIDER RESPONSE TEXT:    This patient has peritonitis only, patient is not septic.     Query created by: Carmencita Arango on 3/1/2021 11:37 AM      Electronically signed by:  Ashley Turner DO 3/1/2021 11:43 AM

## 2021-03-01 NOTE — PROGRESS NOTES
Physician Progress Note      PATIENTSuzie Davis  CSN #:                  [id number]  :                       1980  ADMIT DATE:       2021 7:38 AM  DISCH DATE:  RESPONDING  PROVIDER #:        Avery Nance DO          QUERY TEXT:    Pt admitted with perforated rectum with hemoperitoneum. Pt noted to have EBL   500ml per Op Note and Hgb drop from 15.6 to 11.8. If possible, please document   in the progress notes and discharge summary if you are evaluating and/or   treating any of the following: The medical record reflects the following:  Risk Factors: Rectal perforation w/ EBL 500ml  Clinical Indicators:  Hgb 15.6  3/1  Hgb 11.8  Treatment: Surgical intervention, lab monitoring, telemetry    Thank you, Denise Lopez RN, CDS. Please call with any questions [phone]. Options provided:  -- Acute blood loss anemia  -- Postoperative acute blood loss anemia  -- Anemia due to combination of acute blood loss and dilution  -- Dilutional anemia  -- Other - I will add my own diagnosis  -- Disagree - Not applicable / Not valid  -- Disagree - Clinically unable to determine / Unknown  -- Refer to Clinical Documentation Reviewer    PROVIDER RESPONSE TEXT:    Anemia is due to dilution.     Query created by: Kathi Glynn on 3/1/2021 11:42 AM      Electronically signed by:  Avery Nance DO 3/1/2021 11:56 AM

## 2021-03-01 NOTE — PROGRESS NOTES
POST OP NOTE    SUBJECTIVE      OBJECTIVE  VITALS:  BP (!) 131/106   Pulse 122   Temp 98.9 °F (37.2 °C) (Temporal)   Resp 21   Ht 5' 11\" (1.803 m)   Wt 160 lb (72.6 kg)   SpO2 95%   BMI 22.32 kg/m²         GENERAL:  awake and alert. severe distress, No acute distress  CARDIOVASCULAR:  regular rate and rhythm   LUNGS:  CTA Bilaterally  ABDOMEN:   positive findings:  tenderness moderate RUQ and RLQ  INCISION: Incision clean/dry/intact    ASSESSMENT  1. POD# 0 s/p Pt s/p Exploratory laparotomy, mobilization of falciform ligament, cattell-bresch maneuver, sonam maneuver, control of pelvic hemorrhage, ovalles's procedure, kocherization of duodenum, creation of end colostomy. PLAN  1. Pain management-Fentanyl PCA pump, Tylenol 1000mg q8hr, Toradol 15mg q6hrs, Haldol once for agitation, Valium q6hr  2. DVT proph-held for tomorrow. 3. GI proph-Strict NPO  4. Evaluate labs tomorrow AM   Hemoglobin 11.5 this morning.     5. Continue to evaluate pain    Magaly Brito  Trauma/Surgery Service  2/28/2021 at 9:47 PM

## 2021-03-02 NOTE — PROGRESS NOTES
Comprehensive Nutrition Assessment    Type and Reason for Visit:  Initial, Consult(TF recs)    Nutrition Recommendations/Plan: Start Tube Feeding-Start Immune Enhancing TF at 25 mL/hr as ordered. Monitor TF tolerance. When able to advance rate, suggest goal of 50 ml/hr to provide 1800 kcal and 113 g pro/day. Nutrition Assessment:  Pt admitted with abdominal pain. S/p exploratory laparotomy, mobilization of falciform ligament, cattell-bresch maneuver, sonam maneuver, control of pelvic hemorrhage, ovalles's procedure, kocherization of duodenum, creation of end colostomy on 2/28/21. Pt was intubated this morning. No nutrition at present, but noted order to start Immune Enhancing TF at 25 mL/hr today. Meds/labs reviewed; K 3.2 mmol/L, Phos 1.7 mg/dL. KPhos given this morning.     Malnutrition Assessment:  Malnutrition Status:  Insufficient data    Context:  Acute Illness     Findings of the 6 clinical characteristics of malnutrition:  Energy Intake:  Unable to assess  Weight Loss:  Unable to assess     Body Fat Loss:  No significant body fat loss     Muscle Mass Loss:  No significant muscle mass loss    Fluid Accumulation:  1 - Mild to moderate, Extremities, Generalized   Strength:  Not Performed    Estimated Daily Nutrient Needs:  Energy (kcal):  8981-0393 kcal/day; Weight Used for Energy Requirements:  Current     Protein (g):  110 g pro/day; Weight Used for Protein Requirements:  Current(1.5)          Wounds:  Surgical Incision       Current Nutrition Therapies:    Diet NPO Effective Now  Dietary Nutrition Supplements: Wound Healing Oral Supplement  Diet Tube Feed Continuous/Cyclic w/ Diet  Current Tube Feeding (TF) Orders:  · Formula: Immune Enhancing  · Schedule: Continuous to start at 25 ml/hr today  · Current TF & Flush Orders Provides: 25 mL/hr =900 kcal and 56 g pro/day  · Goal TF & Flush Orders Provides: 50 mL/oo=2897 kcal and 113 g pro/day Additional Calorie Sources:   D5% 0.45% NaCl at 125 ml/hr =510 kcal/day; Propofol at 10.9 mL/ze=416 kcal/day    Anthropometric Measures:  · Height: 5' 9\" (175.3 cm)  · Current Body Weight: 160 lb (72.6 kg)   · Admission Body Weight: 160 lb (72.6 kg)(stated)    · Ideal Body Weight: 160 lbs; % Ideal Body Weight  100%   · BMI: 23.6  · BMI Categories: Normal Weight (BMI 18.5-24. 9)       Nutrition Diagnosis:   · Inadequate oral intake related to impaired respiratory function as evidenced by NPO or clear liquid status due to medical condition,need for enteral nutrition support    Nutrition Interventions:   Food and/or Nutrient Delivery:  Start Tube Feeding-Start Immune Enhancing TF at 25 mL/hr as ordered. When able to advance rate, suggest goal of 50 ml/hr to provide 1800 kcal and 113 g pro/day. Nutrition Education/Counseling:  No recommendation at this time   Coordination of Nutrition Care:  Continue to monitor while inpatient    Goals:  meet % of estimated nutrient needs -goal set       Nutrition Monitoring and Evaluation:   Food/Nutrient Intake Outcomes:  Enteral Nutrition Intake/Tolerance  Physical Signs/Symptoms Outcomes:  Biochemical Data, Fluid Status or Edema, GI Status, Weight, Skin, Nutrition Focused Physical Findings     Discharge Planning:     Too soon to determine     Electronically signed by Meli Tierney RD, LD on 3/2/21 at 10:56 AM EST    Contact: [phone]

## 2021-03-02 NOTE — PROGRESS NOTES
Date: 3/2/2021  Time: 0659  Patient identity confirmed:  Yes  Indications: Airway protection / sedation  Preoxygenation: BVM with 100% O2  Laryngoscope size and type Glidescope 4  Airway introducer used: No  Evac: Yes  Tube size:an 8.0 cuffed  Number of attempts:1   Cords visualized:  [x] Clearly  [] Poorly  Breath sounds present bilaterally: Yes   ETCO2   [x] Positive   Tube secured at 24 at lip  Chest x-ray ordered: Yes    Notes:    Procedure performed by: Dr Bambi Chavez  7:30 AM

## 2021-03-02 NOTE — FLOWSHEET NOTE
Patient extremely agitated throughout the night despite being on a Precedex gtt. Tachycardic and tachypneic when agitated and pulling at lines/tubes while trying to get out of bed. A&O x1. Notified trauma throughout the night - Will continue to monitor.

## 2021-03-02 NOTE — PLAN OF CARE
Problem: Non-Violent Restraints  Goal: Removal from restraints as soon as assessed to be safe  Outcome: Ongoing     Problem: Non-Violent Restraints  Goal: No harm/injury to patient while restraints in use  Outcome: Ongoing     Problem: Non-Violent Restraints  Goal: Patient's dignity will be maintained  Outcome: Ongoing

## 2021-03-02 NOTE — CARE COORDINATION
Case Management Initial Discharge Plan  Jeffery Parents,             Met with:spouse/SO to discuss discharge plans. Information verified: address, contacts, phone number, , insurance Yes    Emergency Contact/Next of Kin name & number: Jeffery Parents (son) [phone]    PCP: Jose L Monae DO  Date of last visit: years    Insurance Provider: SHANNON HERRON    Discharge Planning    Living Arrangements:  Spouse/Significant Other, Children   Support Systems:  Spouse/Significant Other, Parent, [id number] has 2 stories   stairs to climb to get into front door, 1 flight stairs to climb to reach second floor  Location of bedroom/bathroom in home 2nd    Patient able to perform ADL's:Independent    Current Services (outpatient & in home)   DME equipment:   DME provider:     Receiving oral anticoagulation therapy? No    If indicated:   Physician managing anticoagulation treatment:   Where does patient obtain lab work for ATC treatment? Potential Assistance Needed:  Home Care    Patient agreeable to home care: Yes  Freedom of choice provided:  no    Prior SNF/Rehab Placement and Facility:   Agreeable to SNF/Rehab: No  [place] of choice provided: n/a     Evaluation: no    Expected Discharge date:  21    Patient expects to be discharged to:  home with home care  Follow Up Appointment: Best Day/ Time: Tuesday AM    Transportation provider:   Transportation arrangements needed for discharge: No    Readmission Risk              Risk of Unplanned Readmission:        14             Does patient have a readmission risk score greater than 14?: No  If yes, follow-up appointment must be made within 7 days of discharge. Goals of Care:       Discharge Plan: per s.o., pt would not want to go to a facility.  The plan is home with family and home care          Electronically signed by Jean Jimenez RN on 3/2/21 at 5:23 PM EST

## 2021-03-02 NOTE — PLAN OF CARE
Problem: VIOLENT RESTRAINTS  Goal: Removal from restraints as soon as assessed to be safe  Outcome: Completed     Problem: VIOLENT RESTRAINTS  Goal: No harm/injury to patient while restraints in use  Outcome: Completed     Problem: VIOLENT RESTRAINTS  Goal: Patient's dignity will be maintained  Outcome: Completed

## 2021-03-02 NOTE — CARE COORDINATION
Consult received as need legal next of kin    Met with pt's sig other, Renetta Stephens.    She stated they have been together for 14yrs but are not     She stated pt has 6 children:    Anna Lora, age 21 ([phone])  Ketan Bowles, age 25 (phone # unknown at this time), but Renetta Stephens hopes to get from [facility] when he gets off work today)  Precious Cavazos, age 17  Abby, age 15  Aneudy Best, age 15  [de-identified], age 1    She reports his parents are     Explained to Renetta Stephens that pt's adult children would be  next of kin to make decisions for pt while he is unable   Asked that she have Dameus call the RN station (phone # provided) after work to get an update and be informed that he and his brother would be decision makers at this time  Also discussed with her having pt complete DPOA paperwork once he is alert/oriented  Selam Salinas and Ana Barboza NP updated

## 2021-03-02 NOTE — PLAN OF CARE
Nutrition Problem #1: Inadequate oral intake  Intervention: Food and/or Nutrient Delivery: Start Tube Feeding(Start Immune Enhancing TF at 25 mL/hr as ordered.  When able to advance rate, suggest goal of 50 ml/hr to provide 1800 kcal and 113 g pro/day.)  Nutritional Goals: meet % of estimated nutrient needs

## 2021-03-02 NOTE — PROCEDURES
PROCEDURE NOTE - EMERGENCY INTUBATION    PATIENT NAME: Too Navarrete  MEDICAL RECORD NO. [id number]  DATE: 3/2/2021  ATTENDING PHYSICIAN: Dr. Alejandrina Drake DIAGNOSIS:  Acute Respiratory Failure  POSTOPERATIVE DIAGNOSIS:  Same  PROCEDURE PERFORMED: endotracheal intubation  PERFORMING PHYSICIAN: Johana Jose DO    MEDICATIONS: etomidate intravenously and succinycholine intravenously    DISCUSSION:  Too Navarrete is a 36y.o.-year-old male who requires intubation and ventilatory support due to potential airway compromise and altered mental status. The history and physical examination were reviewed and confirmed. CONSENT: Unable to be obtained due to the emergent nature of this procedure. Attempts to contact emergency contact prior to intubation were unsuccessful. PROCEDURE:  A timeout was initiated by the bedside nurse and was confirmed by those present. The patient was placed in the appropriate position. Cricoid pressure was not required. Intubation was performed by indirect laryngoscopy using a bronchoscope and an 8.0 cuffed endotracheal tube. The cuff was then inflated and the tube was secured appropriately at a distance of 22 cm to the dental ridge. Initial confirmation of placement included bilateral breath sounds, an end tidal CO2 detector, absence of sounds over the stomach, tube fogging, adequate chest rise and adequate pulse oximetry reading. A chest x-ray to verify correct placement of the tube showed appropriate tube position. The patient tolerated the procedure well.      COMPLICATIONS:  None     Johana Jose DO  7:12 AM, 3/2/21

## 2021-03-02 NOTE — FLOWSHEET NOTE
Intubation    0656 30 Etomidate   0657 100 Succinylcholine   0657 ETT 24 @ the teeth  0659 Bilat breath sounds, positive color change noted.

## 2021-03-02 NOTE — PROGRESS NOTES
ICU PROGRESS NOTE        PATIENT NAME: Marina Second RECORD NO. [id number]  DATE: 3/2/2021    PRIMARY CARE PHYSICIAN: Niok Monte DO    HD: # 2    ASSESSMENT    Patient Active Problem List   Diagnosis    History of colon polyps    Perforated bowel ([facility] [address]    Perforated rectum [facility])       MEDICAL DECISION MAKING AND PLAN  1. Neuro:  1. Agitation/delerium  1. Hx of cocaine use; concern for drug withdrawal  2. Intubated 3/2 to protect airway  3. Valium 10 q6  4. Thiamine and folate  2. Pain/sedation: fentanyl gtt, ketamine gtt, precedex  3. MMPT: tylenol, motrin, robaxin, gabapentin  2. CV  1. Hx of cocaine use; likely last used day of admission  2. Hypertension and tachycardia associated with agitation; improved after intubation  3. Will obtain baseline EKG and trop   4. Lactic acidosis 0.51  3. Pulm  1. Intubated  2. PRVC: 30/10/16/570  3. PF ration > 300  4. GI/Nutrition  1. 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 Tube feeds 25cc/h; consider advancing tomorrow  3. Monitor ostomy output  4. reglan 10mg q6 for 72h  5. Renal/lytes  1. Total fluids 125  2. Phosphate and magnesium replaced  6. Heme  1. Hgb 8.9 (10)  2. Platelets 926  8. Endocrine        1. Glucose WNL  7. Musculoskeletal  1. PT/OT when extubated  8. Skin  1. No abrasions or lacerations  9. Micro  1. Monitor for fevers  2. WBC 10.9  10. Family/dispo  1. Family at bedside and updated on plan  6. Lines  1. Art line left radial, narayanan cath, ETT, OG tube, PIV     CHECKLIST    CAM-ICU RASS: -3  RESTRAINTS: soft upper and lower  IVF: total fluids 125cc/h  NUTRITION: tube feeds  ANTIBIOTICS: not indicated   GI: reglan, pepcid  DVT: lovenox  GLYCEMIC CONTROL: WNL  HOB >45: ok  MOBILITY: bedrest    SUBJECTIVE    Vel Fogo with worsening agitation, confusion, restlessness. Pt given Haldol and ativan with temporary improvement of symptoms. Worsening respiratory distress. Repeatedly trying to get out of bed and pull out lines. NG tube was pulled out. Oriented only to self. To to respiratory status and severe agitation, decision made to intubate in order to protect airway and aggressively treat agitation that is likely from alcohol/drug withdrawal.     Attempts made to contact patients girlfriend prior to intubation were unsuccessful. OBJECTIVE  VITALS: Temp: Temp: 97.8 °F (36.6 °C)Temp  Av.2 °F (37.3 °C)  Min: 97.8 °F (36.6 °C)  Max: 102.2 °F (39 °C) BP Systolic (21APD), NZB:614 , Min:97 , NGC:970   Diastolic (55BYI), JXP:72, Min:58, Max:100   Pulse Pulse  Av.8  Min: 95  Max: 134 Resp Resp  Av.3  Min: 22  Max: 41 Pulse ox SpO2  Av.8 %  Min: 91 %  Max: 98 %    CONSTITUTIONAL: Intubated, sedated  HEENT: OG tube in place, Pupils 2mm bilat; reactive to light  LUNGS: diffuse rhonchi and wheezing, good chest rise bilaterally  CV: borderline tachycardia, regular rhythm  GI: abd soft, colostomy in place, stoma pink, abd incision clean and dry, mild diffuse tenderness  MUSCULOSKELETAL: moving all extremities, normal peripheral pulses  NEUROLOGIC: sedated, spontaneous movements of extremities, (5/5 strength all extremities prior to extubation)  SKIN: warm and dry        Drain/tube output:      LAB:  CBC:   Recent Labs     21  0609 210 21  0418   WBC 15.6* 14.1* 10.9   HGB 11.8* 10.0* 8.9*   HCT 36.3* 31.0* 28.3*   MCV 92.6 96.0 95.3    209 177     BMP:   Recent Labs     21  0609 21  0418    133* 135   K 4.0 3.4* 3.2*    104 106   CO2 22 21 23   BUN 17 11 9   CREATININE 0.89 0.77 0.64*   GLUCOSE 136* 112* 110*         RADIOLOGY:  XR ABDOMEN FOR NG/OG/NE TUBE PLACEMENT   Final Result   Nasogastric tube tip is in the stomach. XR CHEST PORTABLE   Final Result   Increasing airspace opacities within the right mid to lower lung. XR CHEST PORTABLE   Final Result   1. Shallow inflation.   Perihilar and basilar opacities are noted, which may represent atelectasis and vascular crowding. Developing edema or   inflammatory process should be considered in the appropriate clinical setting. 2.  Suspect small pleural effusions. XR ABDOMEN FOR NG/OG/NE TUBE PLACEMENT   Final Result   Enteric tube as above. No evidence of bowel obstruction. RECOMMENDATIONS:   Advancing tube 9 cm         CT ABDOMEN PELVIS W IV CONTRAST Additional Contrast? None   Final Result   Free fluid adjacent to the liver and spleen, in the pericolic gutters, and   within the pelvis that is isodense to muscle and may contain proteinaceous   material.  This is of uncertain etiology. Distended gallbladder and correlation with sonography may be helpful in   further characterization. Uncomplicated colonic diverticulosis.                  Glenna Becker DO  3/2/21, 6:49 AM

## 2021-03-03 NOTE — PLAN OF CARE
Problem: Skin Integrity:  Goal: Will show no infection signs and symptoms  Description: Will show no infection signs and symptoms  Outcome: Ongoing  Goal: Absence of new skin breakdown  Description: Absence of new skin breakdown  Outcome: Ongoing     Problem: Falls - Risk of:  Goal: Will remain free from falls  Description: Will remain free from falls  Outcome: Ongoing  Goal: Absence of physical injury  Description: Absence of physical injury  Outcome: Ongoing     Problem: Non-Violent Restraints  Goal: Removal from restraints as soon as assessed to be safe  Outcome: Ongoing  Goal: No harm/injury to patient while restraints in use  Outcome: Ongoing  Goal: Patient's dignity will be maintained  Outcome: Ongoing     Problem: OXYGENATION/RESPIRATORY FUNCTION  Goal: Patient will maintain patent airway  3/3/2021 0910 by Tin Love RCP  Outcome: Ongoing  Goal: Patient will achieve/maintain normal respiratory rate/effort  Description: Respiratory rate and effort will be within normal limits for the patient  3/3/2021 0910 by Tin Love RCABEL  Outcome: Ongoing     Problem: MECHANICAL VENTILATION  Goal: Patient will maintain patent airway  3/3/2021 1337 by Juan Ayala RN  Outcome: Ongoing  3/3/2021 0910 by Tin Love RCP  Outcome: Ongoing  Goal: Oral health is maintained or improved  3/3/2021 1337 by Juan Ayala RN  Outcome: Ongoing  3/3/2021 0910 by Tin Love RCABEL  Outcome: Ongoing  Goal: Tracheostomy will be managed safely  Outcome: Ongoing  Goal: ET tube will be managed safely  3/3/2021 0910 by Tin Love RCP  Outcome: Ongoing  Goal: Ability to express needs and understand communication  3/3/2021 0910 by Tin Love RCABEL  Outcome: Ongoing  Goal: Mobility/activity is maintained at optimum level for patient  3/3/2021 0910 by Tin Love RCP  Outcome: Ongoing

## 2021-03-03 NOTE — PLAN OF CARE
Problem: OXYGENATION/RESPIRATORY FUNCTION  Goal: Patient will maintain patent airway  3/2/2021 2101 by Chavo Tang RCP  Outcome: Ongoing     Problem: OXYGENATION/RESPIRATORY FUNCTION  Goal: Patient will achieve/maintain normal respiratory rate/effort  Description: Respiratory rate and effort will be within normal limits for the patient  3/2/2021 2101 by Chavo Tang RCP  Outcome: Ongoing     Problem: MECHANICAL VENTILATION  Goal: Patient will maintain patent airway  3/2/2021 2101 by Chavo Tang RCP  Outcome: Ongoing     Problem: MECHANICAL VENTILATION  Goal: Oral health is maintained or improved  3/2/2021 2101 by Chavo Tang RCP  Outcome: Ongoing     Problem: MECHANICAL VENTILATION  Goal: Tracheostomy will be managed safely  3/2/2021 2101 by Chavo Tang RCP  Outcome: Ongoing     Problem: MECHANICAL VENTILATION  Goal: ET tube will be managed safely  3/2/2021 2101 by Chavo Tang RCP  Outcome: Ongoing     Problem: MECHANICAL VENTILATION  Goal: Mobility/activity is maintained at optimum level for patient  3/2/2021 2101 by Chavo Tang RCP  Outcome: Ongoing

## 2021-03-03 NOTE — PROGRESS NOTES
ICU PROGRESS NOTE        PATIENT NAME: Manuel Burleson RECORD NO. [id number]  DATE: 3/3/2021    PRIMARY CARE PHYSICIAN: Amina Stern DO    HD: # 3    ASSESSMENT    Patient Active Problem List   Diagnosis    History of colon polyps    Perforated bowel ([facility] [address]    Perforated rectum [facility])       MEDICAL DECISION MAKING AND PLAN  1. Neuro:  1. Agitation/delirum   1. Hx of cocaine use; concern for drug withdrawal  2. Intubated 3/2 to protect airway  3. Valium 10 q6   4. Continue with thiamine and folate supplementation    2. Pain sedation:  1. Continue with fentanyl gtt (125), ketamine gtt, and precedex gtt (1.3)  3. MMPT: Tylenol,. Motrin, robaxin, gabapentin. 2. CV  1. Hx of cocaine use  2. Tachycardia associated with agitation, improved after intubation  3. Hypotension  1. Continue with levophed and vasopressin   4. Trop 10  5. Lactic acid 1.5 on 3/1  3. Pulm  1. Intubated   2. PRVC: 30/10/16/570  3. PF Ratio 298  4. GI/Nutrition  1. 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 Tube feeds advance to goal of 50c/hr,   5. Renal/lytes  1. Potassium 3.8 - Will replete 20mEq   2. Hypocalcemia - Ionized Ca 1.06 - Calcium chloride given  3. Mg 2.3,  Will give 2g Mg   4. NS switched to D5 (1/2)NS - total goal 125cc/hr  6. Heme  1. DVT prophylaxis-Lovenox 40mg Daily   2. Hgb 7.7 down from 8.9  7. Endocrine        1. Glucose 130  7. Musculoskeletal  1. PT/OT when extubated  8. Skin  1. No abrasions or lacerations  9. Micro  1. WBC 10.7 down from 10.9  2. Continue zosyn day 4/5  10. Family/dispo  1. Family at bedside and updated on plan  6. Lines  1. A line L radial   2. CVC R IJ  3. PIV  4. Negrete   5. OG - will switch to NG   6.  ETT      CHECKLIST    CAM-ICU RASS: -3  RESTRAINTS: soft upper   IVF: total fluids 125cc/hr  NUTRITION: tube feeds  ANTIBIOTICS: zosyn day 4/5   GI: reglan and pepcid  DVT: lovenox  GLYCEMIC CONTROL: WNL  HOB >45: ok  MOBILITY: bedrest, PT/OT once extubated    SUBJECTIVE Myles Madison  With some worsening agitation over night was given some haldol and had another episode of agitation today at 1400 and required more haldol. Will aim to extubate patient tomorrow. OBJECTIVE  VITALS: Temp: Temp: 98.6 °F (37 °C)Temp  Av.8 °F (37.1 °C)  Min: 98.2 °F (36.8 °C)  Max: 99.7 °F (99.6 °C) BP Systolic (04XDH), :993 , Min:98 , MMI:928   Diastolic (82QUW), MYW:00, Min:58, Max:62   Pulse Pulse  Av.5  Min: 72  Max: 104 Resp Resp  Av  Min: 16  Max: 25 Pulse ox SpO2  Av.2 %  Min: 93 %  Max: 100 %    Physical Exam  Constitutional:      intubated and sedated   HENT:      Head: normocephalic,  Atraumatic  Eyes:      Pupils: equal round and reactive to light   Cardiovascular:      Rate and Rhythm: regular rhythm and rate     Pulses: equal bilaterally  Pulmonary:      Effort: equal chest rise      Breath sounds: rhonchi bilaterally  Abdominal:      General: normal bowel sounds      Palpations: soft, colostomy in place, stoma pink, abd incision is clean and dry  Skin:     General: warm and dry    Drain/tube output:      LAB:  CBC:   Recent Labs     21  0434   WBC 14.1* 10.9 10.7   HGB 10.0* 8.9* 7.7*   HCT 31.0* 28.3* 24.6*   MCV 96.0 95.3 95.7    177 191     BMP:   Recent Labs     21  0434   * 135 139   K 3.4* 3.2* 3.8    106 109*   CO2 21 23 22   BUN 11 9 14   CREATININE 0.77 0.64* 0.83   GLUCOSE 112* 110* 130*         RADIOLOGY:  CXR:   Slightly improved aeration.  The endotracheal tube and nasogastric tube   remain in position.  The right jugular vein central line ends in the distal   SVC.  Persistent bibasilar airspace disease, greater on the right, and small   right pleural effusion.  No pneumothorax or significant left pleural effusion.          Roderick Granados MD  3/3/21, 2:14 PM

## 2021-03-03 NOTE — PROCEDURES
PREOPERATIVE DIAGNOSIS:  Hypotension  POSTOPERATIVE DIAGNOSIS:  Same  PROCEDURE PERFORMED:  Right Internal Jugular Vein Central Line Insertion  ANESTHESIA:  Local utilizing 1% lidocaine  ESTIMATED BLOOD LOSS:  Less than 25 ml  COMPLICATIONS:  None immediately appreciated. DISCUSSION:  Kieran Nguyen is a 36y.o.-year-old male who requires additional IV access and central line for hypotension and pressor support. The history and physical examination were reviewed and confirmed. PROCEDURE:  A timeout was initiated and was confirmed by those present. The patient was placed in a supine position. Ultrasound guidance was utilized. The skin overlying the Right Internal Jugular Vein was prepped with chlorhexadine and draped in sterile fashion. The skin was infiltrated with local anesthetic. Through the anesthetized region, the introducer needle was inserted into the internal jugular vein returning dark red non pulsatile blood. A guidewire was placed through the center of the needle with no resistance. A small incision made in the skin with a #11 scalpel blade. The dilator was inserted into the skin and vein over guidewire using Seldinger technique. The dilator was then removed and the catheter was placed in the vein over the guidewire using Seldinger technique. The guidewire was then removed and all ports aspirated and flushed appropriately. The catheter then secured using silk suture and a temporary sterile dressing was applied. No immediate complication was evident. All sponge, instrument and needle counts were correct at the completion of the procedure. Postprocedural chest x-ray showed good position of the catheter with no evidence of hemothorax or pneumothorax. The patient tolerated the procedure well with no immediate complication evident.      Feroz Bullock  3/2/2021, 7:41 PM

## 2021-03-03 NOTE — PROGRESS NOTES
[employer] counselor assisting his 25year old son with communication. Discussed through counselor dad's condition with his son giovanny age 25.

## 2021-03-03 NOTE — FLOWSHEET NOTE
RN attempted to complete full neurological assessment on patient. When asked to follow commands, patient becomes increasingly agitated and vigorously shakes head from side to side. Pt moves all extremities independently at this time.

## 2021-03-03 NOTE — PLAN OF CARE
Problem: Skin Integrity:  Goal: Will show no infection signs and symptoms  Description: Will show no infection signs and symptoms  3/2/2021 2310 by Akira Gauthier RN  Outcome: Ongoing  3/2/2021 1812 by Chantal Suarez RN  Outcome: Ongoing  Goal: Absence of new skin breakdown  Description: Absence of new skin breakdown  3/2/2021 2310 by Akira Gauthier RN  Outcome: Ongoing  3/2/2021 1812 by Chantal Suarez RN  Outcome: Ongoing     Problem: Falls - Risk of:  Goal: Will remain free from falls  Description: Will remain free from falls  3/2/2021 2310 by Akira Gauthier RN  Outcome: Ongoing  3/2/2021 1812 by Chantal Suarez RN  Outcome: Ongoing  Goal: Absence of physical injury  Description: Absence of physical injury  3/2/2021 2310 by Akira Gauthier RN  Outcome: Ongoing  3/2/2021 1812 by Chantal Suarez RN  Outcome: Ongoing     Problem: Non-Violent Restraints  Goal: No harm/injury to patient while restraints in use  3/2/2021 2310 by Akira Gauthier RN  Outcome: Ongoing  3/2/2021 1812 by Chantal Suarez RN  Outcome: Ongoing  3/2/2021 1809 by Chantal Suarez RN  Outcome: Ongoing  Goal: Patient's dignity will be maintained  3/2/2021 2310 by Akira Gauthier RN  Outcome: Ongoing  3/2/2021 1812 by Chantal Suarez RN  Outcome: Ongoing  3/2/2021 1809 by Chantal Suarez RN  Outcome: Ongoing     Problem: OXYGENATION/RESPIRATORY FUNCTION  Goal: Patient will maintain patent airway  3/2/2021 2310 by Akira Gauthier RN  Outcome: Ongoing  3/2/2021 2101 by Ashley Singleton RCP  Outcome: Ongoing  3/2/2021 2100 by Ashley Singleton RCP  Outcome: Ongoing  3/2/2021 1812 by Chantal Suarez RN  Outcome: Ongoing  Goal: Patient will achieve/maintain normal respiratory rate/effort  Description: Respiratory rate and effort will be within normal limits for the patient  3/2/2021 2310 by Akira Gauthier RN  Outcome: Ongoing  3/2/2021 2101 by Ashley Cap, RCP  Outcome: Ongoing  3/2/2021 2100 by Ashley Singleton RCP  Outcome: Ongoing 3/2/2021 1812 by Louie Ackerman RN  Outcome: Ongoing     Problem: MECHANICAL VENTILATION  Goal: Patient will maintain patent airway  3/2/2021 2310 by Brayden Starks RN  Outcome: Ongoing  3/2/2021 2101 by Ravi Small, RCP  Outcome: Ongoing  3/2/2021 2100 by Ravi Small, RCP  Outcome: Ongoing  3/2/2021 1812 by Louie Ackerman RN  Outcome: Ongoing  Goal: Oral health is maintained or improved  3/2/2021 2310 by Brayden Starks RN  Outcome: Ongoing  3/2/2021 2101 by Ravi Small RCP  Outcome: Ongoing  3/2/2021 2100 by Ravi Small, RCP  Outcome: Ongoing  3/2/2021 1812 by Louie Ackerman RN  Outcome: Ongoing  Goal: Tracheostomy will be managed safely  3/2/2021 2310 by Brayden Starks RN  Outcome: Ongoing  3/2/2021 2101 by Ravi Small RCP  Outcome: Ongoing  3/2/2021 2100 by Ravi Small, RCP  Outcome: Ongoing  3/2/2021 1812 by Louie Ackerman RN  Outcome: Ongoing  Goal: ET tube will be managed safely  3/2/2021 2310 by Brayden Starks RN  Outcome: Ongoing  3/2/2021 2101 by Ravi Small, RCP  Outcome: Ongoing  3/2/2021 2100 by Ravi Small, RCP  Outcome: Ongoing  3/2/2021 1812 by Louie Ackerman RN  Outcome: Ongoing  Goal: Ability to express needs and understand communication  3/2/2021 2310 by Brayden Starks RN  Outcome: Ongoing  3/2/2021 2101 by Ravi Small, RCP  Outcome: Ongoing  3/2/2021 2100 by Ravi Small, RCP  Outcome: Ongoing  3/2/2021 1812 by Louie Ackerman RN  Outcome: Ongoing  Goal: Mobility/activity is maintained at optimum level for patient  3/2/2021 2310 by Brayden Starks RN  Outcome: Ongoing  3/2/2021 2101 by Ravi Small RCP  Outcome: Ongoing  3/2/2021 2100 by Ravi Small, RCP  Outcome: Ongoing  3/2/2021 1812 by Louie Ackerman RN  Outcome: Ongoing

## 2021-03-03 NOTE — PLAN OF CARE
Problem: OXYGENATION/RESPIRATORY FUNCTION  Goal: Patient will maintain patent airway  3/3/2021 0910 by Pancho Milton, RCP  Outcome: Ongoing  3/2/2021 2310 by Emre Stark RN  Outcome: Ongoing  3/2/2021 2101 by Geraline Breeding, RCP  Outcome: Ongoing  3/2/2021 2100 by Geraline Breeding, RCP  Outcome: Ongoing  Goal: Patient will achieve/maintain normal respiratory rate/effort  Description: Respiratory rate and effort will be within normal limits for the patient  3/3/2021 0910 by Pancho Milton, RCP  Outcome: Ongoing  3/2/2021 2310 by Emre Stark RN  Outcome: Ongoing  3/2/2021 2101 by Geraline Breeding, RCP  Outcome: Ongoing  3/2/2021 2100 by Geraline Breeding, RCP  Outcome: Ongoing     Problem: MECHANICAL VENTILATION  Goal: Patient will maintain patent airway  3/3/2021 0910 by Pancho Milton, RCP  Outcome: Ongoing  3/2/2021 2310 by Emre Stark RN  Outcome: Ongoing  3/2/2021 2101 by Geraline Breeding, RCP  Outcome: Ongoing  3/2/2021 2100 by Geraline Breeding, RCP  Outcome: Ongoing  Goal: Oral health is maintained or improved  3/3/2021 0910 by Pancho Milton, RCP  Outcome: Ongoing  3/2/2021 2310 by Emre Stakr RN  Outcome: Ongoing  3/2/2021 2101 by Geraline Breeding, RCP  Outcome: Ongoing  3/2/2021 2100 by Geraline Breeding, RCP  Outcome: Ongoing  Goal: Tracheostomy will be managed safely  3/2/2021 2310 by Emre Stark RN  Outcome: Ongoing  3/2/2021 2101 by Geraline Breeding, RCP  Outcome: Ongoing  3/2/2021 2100 by Geraline Breeding, RCP  Outcome: Ongoing  Goal: ET tube will be managed safely  3/3/2021 0910 by Pancho Milton, RCP  Outcome: Ongoing  3/2/2021 2310 by Emre Stark RN  Outcome: Ongoing  3/2/2021 2101 by Geraline Breeding, RCP  Outcome: Ongoing  3/2/2021 2100 by Geraline Breeding, RCP  Outcome: Ongoing  Goal: Ability to express needs and understand communication  3/3/2021 0910 by Pancho Milton RCP  Outcome: Ongoing  3/2/2021 2310 by Emre Stark RN Outcome: Ongoing  3/2/2021 2101 by Kody Recio RCP  Outcome: Ongoing  3/2/2021 2100 by Kody Recio RCP  Outcome: Ongoing  Goal: Mobility/activity is maintained at optimum level for patient  3/3/2021 0910 by Sandhya Berman RCP  Outcome: Ongoing  3/2/2021 2310 by Isidra Obregon RN  Outcome: Ongoing  3/2/2021 2101 by Kody Recio RCP  Outcome: Ongoing  3/2/2021 2100 by Kody Recio RCP  Outcome: Ongoing

## 2021-03-04 NOTE — PLAN OF CARE
Problem: Skin Integrity:  Goal: Will show no infection signs and symptoms  Description: Will show no infection signs and symptoms  3/4/2021 0130 by Hailee Ley RN  Outcome: Ongoing  3/3/2021 1337 by Vandana Urban RN  Outcome: Ongoing  Goal: Absence of new skin breakdown  Description: Absence of new skin breakdown  3/4/2021 0130 by Hailee Ley RN  Outcome: Ongoing  3/3/2021 1337 by Vandana Urban RN  Outcome: Ongoing     Problem: Falls - Risk of:  Goal: Will remain free from falls  Description: Will remain free from falls  3/4/2021 0130 by Hailee Ley RN  Outcome: Ongoing  3/3/2021 1337 by Vandana Urban RN  Outcome: Ongoing  Goal: Absence of physical injury  Description: Absence of physical injury  3/4/2021 0130 by Hailee Ley RN  Outcome: Ongoing  3/3/2021 1337 by Vandana Urban RN  Outcome: Ongoing     Problem: OXYGENATION/RESPIRATORY FUNCTION  Goal: Patient will maintain patent airway  Outcome: Ongoing  Goal: Patient will achieve/maintain normal respiratory rate/effort  Description: Respiratory rate and effort will be within normal limits for the patient  Outcome: Ongoing     Problem: MECHANICAL VENTILATION  Goal: Patient will maintain patent airway  3/4/2021 0130 by Hailee Ley RN  Outcome: Ongoing  3/3/2021 1337 by Vandana Urban RN  Outcome: Ongoing  Goal: Oral health is maintained or improved  3/4/2021 0130 by Hailee Ley RN  Outcome: Ongoing  3/3/2021 1337 by Vandana Urban RN  Outcome: Ongoing  Goal: Tracheostomy will be managed safely  3/4/2021 0130 by Hailee Ley RN  Outcome: Ongoing  3/3/2021 1337 by Vandana Urban RN  Outcome: Ongoing  Goal: ET tube will be managed safely  Outcome: Ongoing  Goal: Ability to express needs and understand communication  Outcome: Ongoing  Goal: Mobility/activity is maintained at optimum level for patient  Outcome: Ongoing     Problem: Non-Violent Restraints  Goal: Removal from restraints as soon as assessed to be safe  3/4/2021 0130 by Ben Regan RN  Outcome: Not Met This Shift  3/3/2021 1337 by Jackquline Kanner, RN  Outcome: Ongoing  Goal: No harm/injury to patient while restraints in use  3/4/2021 0130 by Ben Regan RN  Outcome: Met This Shift  3/3/2021 1337 by Jackquline Kanner, RN  Outcome: Ongoing  Goal: Patient's dignity will be maintained  3/4/2021 0130 by Ben Regan RN  Outcome: Met This Shift  3/3/2021 1337 by Jackquline Kanner, RN  Outcome: Ongoing     Problem: Confusion - Acute:  Goal: Absence of continued neurological deterioration signs and symptoms  Description: Absence of continued neurological deterioration signs and symptoms  Outcome: Ongoing  Goal: Mental status will be restored to baseline  Description: Mental status will be restored to baseline  Outcome: Ongoing     Problem: Discharge Planning:  Goal: Ability to perform activities of daily living will improve  Description: Ability to perform activities of daily living will improve  Outcome: Ongoing  Goal: Participates in care planning  Description: Participates in care planning  Outcome: Ongoing     Problem: Injury - Risk of, Physical Injury:  Goal: Will remain free from falls  Description: Will remain free from falls  3/4/2021 0130 by Ben Regan RN  Outcome: Ongoing  3/3/2021 1337 by Jackquline Kanner, RN  Outcome: Ongoing  Goal: Absence of physical injury  Description: Absence of physical injury  3/4/2021 0130 by Ben Regan RN  Outcome: Ongoing  3/3/2021 1337 by Jackquline Kanner, RN  Outcome: Ongoing     Problem: Mood - Altered:  Goal: Mood stable  Description: Mood stable  Outcome: Ongoing  Goal: Absence of abusive behavior  Description: Absence of abusive behavior  Outcome: Ongoing  Goal: Verbalizations of feeling emotionally comfortable while being cared for will increase  Description: Verbalizations of feeling emotionally comfortable while being cared for will increase  Outcome: Ongoing     Problem: Psychomotor Activity - Altered:  Goal: Absence of psychomotor disturbance signs and symptoms  Description: Absence of psychomotor disturbance signs and symptoms  Outcome: Ongoing     Problem: Sensory Perception - Impaired:  Goal: Demonstrations of improved sensory functioning will increase  Description: Demonstrations of improved sensory functioning will increase  Outcome: Ongoing  Goal: Decrease in sensory misperception frequency  Description: Decrease in sensory misperception frequency  Outcome: Ongoing  Goal: Able to refrain from responding to false sensory perceptions  Description: Able to refrain from responding to false sensory perceptions  Outcome: Ongoing  Goal: Demonstrates accurate environmental perceptions  Description: Demonstrates accurate environmental perceptions  Outcome: Ongoing  Goal: Able to distinguish between reality-based and nonreality-based thinking  Description: Able to distinguish between reality-based and nonreality-based thinking  Outcome: Ongoing  Goal: Able to interrupt nonreality-based thinking  Description: Able to interrupt nonreality-based thinking  Outcome: Ongoing     Problem: Sleep Pattern Disturbance:  Goal: Appears well-rested  Description: Appears well-rested  Outcome: Ongoing     Problem: RESPIRATORY  Intervention: Respiratory assessment  3/3/2021 1132 by Norma Estes RCP  Note: Ventilator Bronchodilator assessment    Admit for non related pulmonary illness. No history of copd or asthma. Currently on bronchodilators. No history of smoking. Will initiate Vent Aerosol Protocol. Change bronchodilators to as needed for wheezing.       Breath sounds: clear  Inspiratory Pressure: 20  Plateau Pressure: 18    Patient assessed at level 1    [x]    Bronchodilator Assessment    BRONCHODILATOR ASSESSMENT SCORE  Score 0 (Home) 1 2 3 4   Breath Sounds   []  Chronic Ventilator: Patient at baseline [x]  Mild Wheezes/ Clear []  Intermittent wheezes with good air entry []  Bilateral/unilateral wheezing with diminished air entry []  Insp/Exp wheeze

## 2021-03-04 NOTE — PLAN OF CARE
Problem: Skin Integrity:  Goal: Will show no infection signs and symptoms  Description: Will show no infection signs and symptoms  3/4/2021 1143 by Citlaly Wilson RN  Outcome: Ongoing  3/4/2021 1141 by Citlaly Wilson RN  Outcome: Ongoing  3/4/2021 0756 by Dayanara Fontanez RCP  Outcome: Ongoing  3/4/2021 0130 by Laureano Sawyer RN  Outcome: Ongoing  Goal: Absence of new skin breakdown  Description: Absence of new skin breakdown  3/4/2021 1143 by Citlaly Wilson RN  Outcome: Ongoing  3/4/2021 1141 by Citlaly Wilson RN  Outcome: Ongoing  3/4/2021 0756 by Dayanara Fontanez RCP  Outcome: Ongoing  3/4/2021 0130 by Laureano Sawyer RN  Outcome: Ongoing     Problem: Falls - Risk of:  Goal: Will remain free from falls  Description: Will remain free from falls  3/4/2021 1143 by Citlaly Wilson RN  Outcome: Ongoing  3/4/2021 1141 by Citlaly Wilson RN  Outcome: Ongoing  3/4/2021 0130 by Laureano Sawyer RN  Outcome: Ongoing  Goal: Absence of physical injury  Description: Absence of physical injury  3/4/2021 1143 by Citlaly Wilson RN  Outcome: Ongoing  3/4/2021 0130 by Laureano Sawyer RN  Outcome: Ongoing     Problem: Non-Violent Restraints  Goal: Removal from restraints as soon as assessed to be safe  3/4/2021 1143 by Citlaly Wilson RN  Outcome: Ongoing  3/4/2021 1141 by Citlaly Wilson RN  Outcome: Ongoing  3/4/2021 0130 by Laureano Sawyer RN  Outcome: Not Met This Shift  Goal: No harm/injury to patient while restraints in use  3/4/2021 1143 by Citlaly Wilson RN  Outcome: Ongoing  3/4/2021 1141 by Citlaly Wilson RN  Outcome: Ongoing  3/4/2021 0130 by Laureano Sawyer RN  Outcome: Met This Shift  Goal: Patient's dignity will be maintained  3/4/2021 1143 by Citlaly Wilson RN  Outcome: Ongoing  3/4/2021 1141 by Citlaly Wilson RN  Outcome: Ongoing  3/4/2021 0130 by Laureano Sawyer RN  Outcome: Met This Shift     Problem: OXYGENATION/RESPIRATORY FUNCTION  Goal: Patient will maintain patent airway  3/4/2021 0756 by Renetta Norton, RCP  Outcome: Ongoing  3/4/2021 0130 by Halley Dominguez RN  Outcome: Ongoing  Goal: Patient will achieve/maintain normal respiratory rate/effort  Description: Respiratory rate and effort will be within normal limits for the patient  3/4/2021 0756 by Renetta Norton, RCP  Outcome: Ongoing  3/4/2021 0130 by Halley Dominguez RN  Outcome: Ongoing     Problem: MECHANICAL VENTILATION  Goal: Patient will maintain patent airway  3/4/2021 1143 by Shayan Sanchez RN  Outcome: Ongoing  3/4/2021 1141 by Shayan Sanchez RN  Outcome: Ongoing  3/4/2021 0756 by Renetta Norton, RCP  Outcome: Ongoing  3/4/2021 0130 by Halley Dominguez RN  Outcome: Ongoing  Goal: Oral health is maintained or improved  3/4/2021 0756 by Renetta Norton, RCP  Outcome: Ongoing  3/4/2021 0130 by Halley Dominguez RN  Outcome: Ongoing  Goal: Tracheostomy will be managed safely  3/4/2021 1141 by Shayan Sanchez RN  Outcome: Ongoing  3/4/2021 0756 by Renetta Norton, RCP  Outcome: Ongoing  3/4/2021 0130 by Halley Dominguez RN  Outcome: Ongoing  Goal: ET tube will be managed safely  3/4/2021 1143 by Shayan Sanchez RN  Outcome: Ongoing  3/4/2021 0756 by Renetta Norton, RCP  Outcome: Ongoing  3/4/2021 0130 by Halley Dominguez RN  Outcome: Ongoing  Goal: Ability to express needs and understand communication  3/4/2021 0756 by Renetta Norton, RCP  Outcome: Ongoing  3/4/2021 0130 by Halley Dominguez RN  Outcome: Ongoing  Goal: Mobility/activity is maintained at optimum level for patient  3/4/2021 0756 by Renetta Norton, RCP  Outcome: Ongoing  3/4/2021 0130 by Halley Dominguez RN  Outcome: Ongoing     Problem: Confusion - Acute:  Goal: Absence of continued neurological deterioration signs and symptoms  Description: Absence of continued neurological deterioration signs and symptoms  3/4/2021 0130 by Halley Dominguez RN  Outcome: Ongoing  Goal: Mental status will be restored to baseline  Description: Mental status will be restored to baseline 3/4/2021 0130 by Willy Rangel RN  Outcome: Ongoing     Problem: Discharge Planning:  Goal: Ability to perform activities of daily living will improve  Description: Ability to perform activities of daily living will improve  3/4/2021 0130 by Willy Rangel RN  Outcome: Ongoing  Goal: Participates in care planning  Description: Participates in care planning  3/4/2021 0130 by Willy Rangel RN  Outcome: Ongoing     Problem: Injury - Risk of, Physical Injury:  Goal: Will remain free from falls  Description: Will remain free from falls  3/4/2021 1143 by Ariadna Eisenberg RN  Outcome: Ongoing  3/4/2021 1141 by Ariadna Eisenberg RN  Outcome: Ongoing  3/4/2021 0130 by Willy Rangel RN  Outcome: Ongoing  Goal: Absence of physical injury  Description: Absence of physical injury  3/4/2021 1143 by Ariadna Eisenberg RN  Outcome: Ongoing  3/4/2021 0130 by Willy Rangel RN  Outcome: Ongoing     Problem: Mood - Altered:  Goal: Mood stable  Description: Mood stable  3/4/2021 0130 by Willy Rangel RN  Outcome: Ongoing  Goal: Absence of abusive behavior  Description: Absence of abusive behavior  3/4/2021 0130 by Willy Rangel RN  Outcome: Ongoing  Goal: Verbalizations of feeling emotionally comfortable while being cared for will increase  Description: Verbalizations of feeling emotionally comfortable while being cared for will increase  3/4/2021 0130 by Willy Rangel RN  Outcome: Ongoing     Problem: Psychomotor Activity - Altered:  Goal: Absence of psychomotor disturbance signs and symptoms  Description: Absence of psychomotor disturbance signs and symptoms  3/4/2021 0130 by Willy Rangel RN  Outcome: Ongoing     Problem: Sensory Perception - Impaired:  Goal: Demonstrations of improved sensory functioning will increase  Description: Demonstrations of improved sensory functioning will increase  3/4/2021 0130 by Willy Rangel RN  Outcome: Ongoing  Goal: Decrease in sensory misperception frequency  Description: Decrease in sensory misperception frequency  3/4/2021 0130 by Que You RN  Outcome: Ongoing  Goal: Able to refrain from responding to false sensory perceptions  Description: Able to refrain from responding to false sensory perceptions  3/4/2021 0130 by Que You RN  Outcome: Ongoing  Goal: Demonstrates accurate environmental perceptions  Description: Demonstrates accurate environmental perceptions  3/4/2021 0130 by Que You RN  Outcome: Ongoing  Goal: Able to distinguish between reality-based and nonreality-based thinking  Description: Able to distinguish between reality-based and nonreality-based thinking  3/4/2021 0130 by Que You RN  Outcome: Ongoing  Goal: Able to interrupt nonreality-based thinking  Description: Able to interrupt nonreality-based thinking  3/4/2021 0130 by Que You RN  Outcome: Ongoing     Problem: Sleep Pattern Disturbance:  Goal: Appears well-rested  Description: Appears well-rested  3/4/2021 0130 by Que You RN  Outcome: Ongoing

## 2021-03-04 NOTE — PLAN OF CARE
Problem: Skin Integrity:  Goal: Will show no infection signs and symptoms  Description: Will show no infection signs and symptoms  3/4/2021 0756 by Deonte Donaldson RCP  Outcome: Ongoing  3/4/2021 0130 by Amrik Zuniga RN  Outcome: Ongoing  Goal: Absence of new skin breakdown  Description: Absence of new skin breakdown  3/4/2021 0756 by Deonte Donaldson RCP  Outcome: Ongoing  3/4/2021 0130 by Amrik Zuniga RN  Outcome: Ongoing     Problem: OXYGENATION/RESPIRATORY FUNCTION  Goal: Patient will maintain patent airway  3/4/2021 0756 by Deonte Donaldson RCP  Outcome: Ongoing  3/4/2021 0130 by Amrik Zuniga RN  Outcome: Ongoing  Goal: Patient will achieve/maintain normal respiratory rate/effort  Description: Respiratory rate and effort will be within normal limits for the patient  3/4/2021 0756 by Deonte Donaldson RCP  Outcome: Ongoing  3/4/2021 0130 by Amrik Zuniga RN  Outcome: Ongoing     Problem: MECHANICAL VENTILATION  Goal: Patient will maintain patent airway  3/4/2021 0756 by Deonte Donaldson RCP  Outcome: Ongoing  3/4/2021 0130 by Amrik Zuniga RN  Outcome: Ongoing  Goal: Oral health is maintained or improved  3/4/2021 0756 by Deonte Donaldson RCP  Outcome: Ongoing  3/4/2021 0130 by Amrik Zuniga RN  Outcome: Ongoing  Goal: Tracheostomy will be managed safely  3/4/2021 0756 by Deonte Donaldson RCP  Outcome: Ongoing  3/4/2021 0130 by Amrik Zuniga RN  Outcome: Ongoing  Goal: ET tube will be managed safely  3/4/2021 0756 by Deonte Donaldson RCP  Outcome: Ongoing  3/4/2021 0130 by Amrik Zuniga RN  Outcome: Ongoing  Goal: Ability to express needs and understand communication  3/4/2021 0756 by Deonte Donaldson RCP  Outcome: Ongoing  3/4/2021 0130 by Amrik Zuniga RN  Outcome: Ongoing  Goal: Mobility/activity is maintained at optimum level for patient  3/4/2021 0756 by Deonte Donaldson RCP  Outcome: Ongoing  3/4/2021 0130 by Amrik Zuniga RN  Outcome: Ongoing

## 2021-03-04 NOTE — PROGRESS NOTES
03/04/21 1110   Vent Information   Vent Type Servo i   Vent Mode Bi-Level  (per Dr Susy Jett)   Time high 5 cmH2O   Time low 0.5 cmH2O   Pressure High 26 cmH2O   Pressure low 0 cmH2O   Pressure Support 10 cmH20   FiO2  30 %

## 2021-03-04 NOTE — PROGRESS NOTES
Electronically signed by Merlene Vincent RD, MEGAN on 3/4/21 at 3:42 PM EST    Contact: [phone]

## 2021-03-04 NOTE — PROGRESS NOTES
03/04/21 0917   Vent Information   Vent Type Servo i   Vent Mode PRVC   Vt Ordered 480 mL  (per Dr Jean Pierre Matthew )   PEEP/CPAP 12

## 2021-03-05 NOTE — PROGRESS NOTES
03/05/21 1804   Vent Information   Vent Type Servo i   Vent Mode Bi-Level   Time high 6 cmH2O  (per Dr Susie Murray)   Time low 0.5 cmH2O   Pressure High 26 cmH2O   Pressure low 0 cmH2O   Pressure Support 10 cmH20   FiO2  30 %

## 2021-03-05 NOTE — PLAN OF CARE
Problem: OXYGENATION/RESPIRATORY FUNCTION  Goal: Patient will maintain patent airway  3/4/2021 1930 by Giovanna Deleon RCP  Outcome: Ongoing     Problem: OXYGENATION/RESPIRATORY FUNCTION  Goal: Patient will achieve/maintain normal respiratory rate/effort  Description: Respiratory rate and effort will be within normal limits for the patient  3/4/2021 1930 by Giovanna Deleon RCP  Outcome: Ongoing     Problem: MECHANICAL VENTILATION  Goal: Patient will maintain patent airway  3/4/2021 1930 by Giovanna Deleon RCP  Outcome: Ongoing     Problem: MECHANICAL VENTILATION  Goal: Oral health is maintained or improved  3/4/2021 1930 by Giovanna Deleon RCP  Outcome: Ongoing     Problem: MECHANICAL VENTILATION  Goal: Tracheostomy will be managed safely  3/4/2021 1930 by Giovanna Deleon RCP  Outcome: Ongoing     Problem: MECHANICAL VENTILATION  Goal: ET tube will be managed safely  3/4/2021 1930 by Giovanna Deleon RCP  Outcome: Ongoing     Problem: MECHANICAL VENTILATION  Goal: Ability to express needs and understand communication  3/4/2021 1930 by Giovanna Deleon RCP  Outcome: Ongoing     Problem: MECHANICAL VENTILATION  Goal: Mobility/activity is maintained at optimum level for patient  3/4/2021 1930 by Giovanna Deleon RCP  Outcome: Ongoing

## 2021-03-05 NOTE — DISCHARGE INSTR - COC
Continuity of Care Form    Patient Name: Brian Morgan   :  1980  MRN:  [id number]    Admit date:  2021  Discharge date:  ***    Code Status Order: Full Code   Advance Directives:   Advance Care Flowsheet Documentation       Date/Time Healthcare Directive Type of Healthcare Directive Copy in [address] Agent's Name Healthcare Agent's Phone Number    21 1548  No, patient does not have an advance directive for healthcare treatment -- -- -- -- --            Admitting Physician:  Stefanie Lee MD  PCP: Archie Dumont DO    Discharging Nurse: [facility] Unit/Room#: 1896/8725-73  Discharging Unit Phone Number: ***    Emergency Contact:   Extended Emergency Contact Information  Primary Emergency Contact: [address] [facility],[address] Phone: [phone]  Relation: Other   needed?  No  Secondary Emergency Contact: Vince Joiner  [place] Phone: [phone]  Relation: Child    Past Surgical History:  Past Surgical History:   Procedure Laterality Date    ABDOMEN SURGERY  2021    LAPAROTOMY, MOBILIZATION OF FALSIFORM LIGAMENT, CATTELL-BRESCH MANEUVER, DINA MANEUVER, CONTROL OF PELVIC HEMORRHAGE, GREENE'S PROCEDURE, KOCHERIZATION OF DUODENUM, CREATION OF END COLOSTOMY (N/A )    COLONOSCOPY N/A 10/25/2019    COLONOSCOPY WITH BIOPSY and cold snare performed by Pooja Spicer MD at [address] [place] [address] N/A 2021    EXPLORATORY LAPAROTOMY, MOBILIZATION OF FALSIFORM LIGAMENT, CATTELL-BRESCH MANEUVER, DINA MANEUVER, CONTROL OF PELVIC HEMORRHAGE, GREENE'S PROCEDURE, KOCHERIZATION OF DUODENUM, CREATION OF END COLOSTOMY performed by Stefanie Lee MD at [place] [address]       Immunization History:   Immunization History   Administered Date(s) Administered    Rabies 09/10/2016    Rabies Immune Globulin 09/10/2016    Tdap (Boostrix, Adacel) 09/10/2016       Active Problems:  Patient Active Problem List   Diagnosis Code    History of colon polyps Z86.010 Perforated bowel ([address] K63.1    Perforated rectum ([facility]) K63.1       Isolation/Infection:   Isolation            No Isolation          Patient Infection Status       None to display            Nurse Assessment:  Last Vital Signs: BP (!) 107/54   Pulse 101   Temp 101.2 °F (38.4 °C) (Axillary)   Resp 24   Ht 5' 9\" (1.753 m)   Wt 182 lb 5.1 oz (82.7 kg)   SpO2 99%   BMI 26.92 kg/m²     Last documented pain score (0-10 scale): Pain Level: (pt intubated, fentanyl recently stopped)  Last Weight:   Wt Readings from Last 1 Encounters:   03/05/21 182 lb 5.1 oz (82.7 kg)     Mental Status:  {IP PT MENTAL STATUS:20030:::0}    IV Access:  { DE IV ACCESS:345558224:::0}    Nursing Mobility/ADLs:  Walking   {P DME ADLs:245181877:::0}  Transfer  {CHP DME ADLs:210515602:::0}  Bathing  {CHP DME ADLs:627583468:::0}  Dressing  {CHP DME ADLs:398020542:::0}  Toileting  {CHP DME ADLs:752717757:::0}  Feeding  {CHP DME ADLs:372852314:::0}  Med Admin  {P DME ADLs:566414566:::0}  Med Delivery   { DE MED Delivery:579104224:::0}    Wound Care Documentation and Therapy:        Elimination:  Continence:    Bowel: {YES / XC:49408}  Bladder: {YES / UU:80871}  Urinary Catheter: {Urinary Catheter:291175982:::0}   Colostomy/Ileostomy/Ileal Conduit: {YES / PT:26416}  Colostomy Descending/sigmoid-Stomal Appliance: 1 piece, Clean, Dry, Intact  Colostomy Descending/sigmoid-Stoma  Assessment: Protrudes, Red, Moist  Colostomy Descending/sigmoid-Mucocutaneous Junction: Intact  Colostomy Descending/sigmoid-Peristomal Assessment: Pink, Intact, Clean  Colostomy Descending/sigmoid-Treatment: Bag change, Site care  Colostomy Descending/sigmoid-Stool Appearance: Watery  Colostomy Descending/sigmoid-Stool Color: Brown  Colostomy Descending/sigmoid-Stool Amount: (waiting for team to round and assess)  Colostomy Descending/sigmoid-Output (mL): 500 ml    Date of Last BM: ***    Intake/Output Summary (Last 24 hours) at 3/5/2021 4579 Last data filed at 3/5/2021 1800  Gross per 24 hour   Intake 4633.9 ml   Output 2196 ml   Net 2437.9 ml     I/O last 3 completed shifts:   In: 4954.9 [I.V.:4243.9; NG/GT:711]  Out: 2509 [Urine:1609; Emesis/NG output:400; Stool:500]    Safety Concerns:     [address] [facility] Safety Concerns:975970389:::0}    Impairments/Disabilities:      [address] [place] [address] Impairments/Disabilities:774023448:::0}    Nutrition Therapy:  Current Nutrition Therapy:   [address] [facility] Diet List:039660674:::0}    Routes of Feeding: {CHP DME Other Feedings:501288118:::0}  Liquids: {Slp liquid thickness:85074}  Daily Fluid Restriction: {CHP DME Yes amt example:148421302:::0}  Last Modified Barium Swallow with Video (Video Swallowing Test): {Done Not Done SZWO:644763239:::9}    Treatments at the Time of Hospital Discharge:   Respiratory Treatments: ***  Oxygen Therapy:  {Therapy; copd oxygen:30037:::0}  Ventilator:    {[facility] Vent List:760472602:::0}    Rehab Therapies: {THERAPEUTIC INTERVENTION:7555527376}  Weight Bearing Status/Restrictions: [address] [place] [address] Weight Bearin:::0}  Other Medical Equipment (for information only, NOT a DME order):  {EQUIPMENT:721996077}  Other Treatments: ***    Patient's personal belongings (please select all that are sent with patient):  {CHP DME Belongings:834069570:::0}    RN SIGNATURE:  {Esignature:636892927:::0}    CASE MANAGEMENT/SOCIAL WORK SECTION    Inpatient Status Date: ***    Readmission Risk Assessment Score:  Readmission Risk              Risk of Unplanned Readmission:        14           Discharging to Facility/ Agency   Name:   Address:  Phone:  Fax:    Dialysis Facility (if applicable)   Name:  Address:  Dialysis Schedule:  Phone:  Fax:    / signature: {Esignature:169702514:::0}    PHYSICIAN SECTION    Prognosis: {Prognosis:8143043364:::0}    Condition at Discharge: [address] Patient Condition:124160500:::0}    Rehab Potential (if transferring to Rehab): {Prognosis:5877024564:::0}

## 2021-03-05 NOTE — PROGRESS NOTES
The tube was secured with an endotracheal tube warner. The endotracheal tube was moved and the skin assessed. Skin assessment revealed no problems. Endotracheal tube was taped at the  24 cm lissa. Oral gastric tube was not retaped to the endotracheal tube. Endotracheal tube warner was applied on March 5.  Action steps for any skin breakdown: none    LARISA RAMIREZ  2:26 PM

## 2021-03-05 NOTE — PROGRESS NOTES
ICU PROGRESS NOTE        PATIENT NAME: Olegario [facility] RECORD NO. [id number]  DATE: 3/5/2021    PRIMARY CARE PHYSICIAN: Sanchez Espinosa, DO    HD: # 5    ASSESSMENT    Patient Active Problem List   Diagnosis    History of colon polyps    Perforated bowel ([facility] [address]    Perforated rectum [facility])       MEDICAL DECISION MAKING AND PLAN  1. Neuro:  1. Hx of cocaine abuse, concern for drug withdrawal   2. Agitation/delirium   1. Intubated 3/2 to protect airway   2. Continue with fentanyl gtt (100)and precedex gtt (1.1)  3. Continue with prn Haldo and valium 10mg q6  3. Pain management   1. Continue with MMPT and ketamine gtt  4. Continue with thiamine and folate supplementation   2. CV  1. HR 80-97  2. MAP 60-76  3. Lactic acid 0.66  4. Levophed gtt weaned off last night  5. Hx of cocaine abuse    3. Pulm  1. Intubated   2. APRV: PEEP10, FiO2 30%, Ppeak 36  3. PF ratio 245  4. AB.431/39.2/73.6/26  4. GI/Nutrition  1. Ex lap ovalles's procedure, creation of end colostomy ()   2. Continue with pepcid 20mg and reglan 10mg   3. Hold off on advancing due to concern of possibly ileus   1. CT abd - some fluid filled dilated small bowel loops in the left mid abdomen proximal to the colostomy concerning for postop ileus  5. Renal/lytes  1. Potassium 3.7 - repletion was given   2. Hypocalemia - was replaced yesterday   3. Mg - 2.1 - replacement given    4. D5() NS -total fluid goal of 125cc/hr  5. Urine output 0.4cc/kg/hr  6. Heme  1. DVT prophylaxis- levonox 40mg daily  2. Hgb 7.6 down from 8.7   7. Endocrine        1. Glucose 139   7. Musculoskeletal  1. PT/OT when extubated   8. Skin  1. No abrasions or lacerations   9. Micro  1. WBC 13.5 down from 13.7   2. procalcitonin 1.81 down from 2.98  3. Tmax 99.7, temp of 102.6 yesterday   10. Family/dispo  1. Will continue to wean off ventilatory and sedation as patient tolerates, family updated   11. Lines  1. A-line L radial   2. CVC R IJ  3. OG -> NG   4.  ETT CHECKLIST    CAM-ICU RASS: -3  RESTRAINTS: y  IVF: 60cc/hr D5 (1/2) NS  NUTRITION: tube feeds  ANTIBIOTICS: n  GI: pepcid and reglan   DVT: lovenox   GLYCEMIC CONTROL: none  HOB >45: y  MOBILITY: PT/OT when extubated   SBT: as appropriate    SUBJECTIVE    Rishi Bess  is intubated and sedated, will wean off fentanyl and assess mentation to see if SBT is appropriate and assess for extubation is successful in the next 24hrs. Temp 101.4 at 1311 tylenol given. Lasix 40mg will be given and urine output closely monitored.      OBJECTIVE  VITALS: Temp: Temp: 99.7 °F (37.6 °C)Temp  Av.5 °F (38.1 °C)  Min: 99.3 °F (37.4 °C)  Max: 102.6 °F (11.8 °C) BP Systolic (21FON), FBW:839 , Min:93 , IFN:500   Diastolic (75UHZ), QID:45, Min:52, Max:63   Pulse Pulse  Av  Min: 77  Max: 102 Resp Resp  Av.4  Min: 11  Max: 25 Pulse ox SpO2  Av.1 %  Min: 96 %  Max: 100 %    Physical Exam  Constitutional:      intubated and sedated   HENT:      Head: normocephalic atraumatic    Eyes:      Pupils: pupils are equal round and reactive to light   Cardiovascular:      Rate and Rhythm: regular rate and rhythm      Pulses: RP 2+ bilaterally, PT pulses 2+ bilaterally   Pulmonary:      Effort: intubated     Breath sounds: diminished lung sounds at right base, no ronchi or wheezing  Abdominal:      General: incision site has some minimal surrounding erythema, with no fluctuance, no drainage, hypoactive bowel sounds      Palpations: minimally distended with some mild diffuse abdominal tenderness to palpation  Extremities:   Bilateral upper extremity edema 2+  Skin:     General: warm and dry     LAB:  CBC:   Recent Labs     21  0434 21  1945 21  0400 21  0509   WBC 10.7  --  13.7* 13.5*   HGB 7.7* 8.6* 8.7* 7.6*   HCT 24.6* 26.2* 26.1* 24.0*   MCV 95.7  --  92.6 96.4     --  241 277     BMP:   Recent Labs     21  0434 21  0400 21  0509    138 141   K 3.8 3.4* 3.7   * 107 109* CO2 22 24 24   BUN 14 18 20   CREATININE 0.83 0.74 0.78   GLUCOSE 130* 98 108*         RADIOLOGY:  CXR:   Impression   Slightly decreased congestion and bilateral pleural effusions.        Connor Riggs MD  3/5/21, 6:55 AM

## 2021-03-05 NOTE — PLAN OF CARE
Problem: Skin Integrity:  Goal: Will show no infection signs and symptoms  Description: Will show no infection signs and symptoms  Outcome: Ongoing  Goal: Absence of new skin breakdown  Description: Absence of new skin breakdown  Outcome: Ongoing     Problem: Falls - Risk of:  Goal: Will remain free from falls  Description: Will remain free from falls  Outcome: Ongoing  Goal: Absence of physical injury  Description: Absence of physical injury  Outcome: Ongoing     Problem: Non-Violent Restraints  Goal: Removal from restraints as soon as assessed to be safe  Outcome: Ongoing  Goal: No harm/injury to patient while restraints in use  Outcome: Ongoing  Goal: Patient's dignity will be maintained  Outcome: Ongoing     Problem: OXYGENATION/RESPIRATORY FUNCTION  Goal: Patient will maintain patent airway  3/5/2021 0144 by Dyan Cai RN  Outcome: Ongoing  3/4/2021 1930 by Barrett Joyner RCP  Outcome: Ongoing  Goal: Patient will achieve/maintain normal respiratory rate/effort  Description: Respiratory rate and effort will be within normal limits for the patient  3/5/2021 0144 by Dyan Cai RN  Outcome: Ongoing  3/4/2021 1930 by Barrett Joyner RCP  Outcome: Ongoing     Problem: MECHANICAL VENTILATION  Goal: Patient will maintain patent airway  3/5/2021 0144 by Dyan Cai RN  Outcome: Ongoing  3/4/2021 1930 by Barrett Joyner RCP  Outcome: Ongoing  Goal: Oral health is maintained or improved  3/5/2021 0144 by Dyan Cai RN  Outcome: Ongoing  3/4/2021 1930 by Barrett Joyner RCP  Outcome: Ongoing  Goal: Tracheostomy will be managed safely  3/5/2021 0144 by Dyan Cai RN  Outcome: Ongoing  3/4/2021 1930 by Barrett Joyner RCP  Outcome: Ongoing  Goal: ET tube will be managed safely  3/5/2021 0144 by Dyan Cai RN  Outcome: Ongoing  3/4/2021 1930 by Barrett Joyner RCP  Outcome: Ongoing  Goal: Ability to express needs and understand communication  3/5/2021 0144 by Dyan Cai RN  Outcome: Ongoing  3/4/2021 1930 by Damián Marrero RCP  Outcome: Ongoing  Goal: Mobility/activity is maintained at optimum level for patient  3/5/2021 0144 by Barbara Richard RN  Outcome: Ongoing  3/4/2021 1930 by Damián Marrero RCP  Outcome: Ongoing     Problem: Confusion - Acute:  Goal: Absence of continued neurological deterioration signs and symptoms  Description: Absence of continued neurological deterioration signs and symptoms  Outcome: Ongoing  Goal: Mental status will be restored to baseline  Description: Mental status will be restored to baseline  Outcome: Ongoing     Problem: Discharge Planning:  Goal: Ability to perform activities of daily living will improve  Description: Ability to perform activities of daily living will improve  Outcome: Ongoing  Goal: Participates in care planning  Description: Participates in care planning  Outcome: Ongoing     Problem: Injury - Risk of, Physical Injury:  Goal: Will remain free from falls  Description: Will remain free from falls  Outcome: Ongoing  Goal: Absence of physical injury  Description: Absence of physical injury  Outcome: Ongoing     Problem: Mood - Altered:  Goal: Mood stable  Description: Mood stable  Outcome: Ongoing  Goal: Absence of abusive behavior  Description: Absence of abusive behavior  Outcome: Ongoing  Goal: Verbalizations of feeling emotionally comfortable while being cared for will increase  Description: Verbalizations of feeling emotionally comfortable while being cared for will increase  Outcome: Ongoing     Problem: Psychomotor Activity - Altered:  Goal: Absence of psychomotor disturbance signs and symptoms  Description: Absence of psychomotor disturbance signs and symptoms  Outcome: Ongoing     Problem: Sensory Perception - Impaired:  Goal: Demonstrations of improved sensory functioning will increase  Description: Demonstrations of improved sensory functioning will increase  Outcome: Ongoing  Goal: Decrease in sensory misperception frequency  Description: Decrease in sensory misperception frequency  Outcome: Ongoing  Goal: Able to refrain from responding to false sensory perceptions  Description: Able to refrain from responding to false sensory perceptions  Outcome: Ongoing  Goal: Demonstrates accurate environmental perceptions  Description: Demonstrates accurate environmental perceptions  Outcome: Ongoing  Goal: Able to distinguish between reality-based and nonreality-based thinking  Description: Able to distinguish between reality-based and nonreality-based thinking  Outcome: Ongoing  Goal: Able to interrupt nonreality-based thinking  Description: Able to interrupt nonreality-based thinking  Outcome: Ongoing     Problem: Sleep Pattern Disturbance:  Goal: Appears well-rested  Description: Appears well-rested  Outcome: Ongoing

## 2021-03-05 NOTE — PLAN OF CARE
Problem: Skin Integrity:  Goal: Will show no infection signs and symptoms  Description: Will show no infection signs and symptoms  3/5/2021 1856 by Dorie Weinberg RN  Outcome: Met This Shift  3/5/2021 0820 by Gus Combs RCP  Outcome: Ongoing  Goal: Absence of new skin breakdown  Description: Absence of new skin breakdown  3/5/2021 1856 by Dorie Weinberg RN  Outcome: Met This Shift  3/5/2021 0820 by Gus Combs RCP  Outcome: Ongoing     Problem: Falls - Risk of:  Goal: Will remain free from falls  Description: Will remain free from falls  Outcome: Met This Shift  Goal: Absence of physical injury  Description: Absence of physical injury  Outcome: Met This Shift     Problem: Non-Violent Restraints  Goal: No harm/injury to patient while restraints in use  Outcome: Met This Shift  Goal: Patient's dignity will be maintained  Outcome: Met This Shift     Problem: OXYGENATION/RESPIRATORY FUNCTION  Goal: Patient will maintain patent airway  3/5/2021 1856 by Dorie Weinberg RN  Outcome: Met This Shift  3/5/2021 0820 by Gus Combs RCP  Outcome: Ongoing     Problem: MECHANICAL VENTILATION  Goal: Patient will maintain patent airway  3/5/2021 1856 by Dorie Weinberg RN  Outcome: Met This Shift  3/5/2021 0820 by Gus Combs RCP  Outcome: Ongoing  Goal: Oral health is maintained or improved  3/5/2021 1856 by Dorie Weinberg RN  Outcome: Met This Shift  3/5/2021 0820 by Gus Combs RCP  Outcome: Ongoing  Goal: ET tube will be managed safely  3/5/2021 1856 by Dorie Weinberg RN  Outcome: Met This Shift  3/5/2021 0820 by Gus Combs RCP  Outcome: Ongoing     Problem: Injury - Risk of, Physical Injury:  Goal: Will remain free from falls  Description: Will remain free from falls  Outcome: Met This Shift  Goal: Absence of physical injury  Description: Absence of physical injury  Outcome: Met This Shift     Problem: Non-Violent Restraints  Goal: Removal from restraints as soon as assessed to be safe Outcome: Ongoing     Problem: OXYGENATION/RESPIRATORY FUNCTION  Goal: Patient will achieve/maintain normal respiratory rate/effort  Description: Respiratory rate and effort will be within normal limits for the patient  3/5/2021 1856 by Brandon Mauricio RN  Outcome: Ongoing  3/5/2021 0820 by Delon Smith RCP  Outcome: Ongoing     Problem: MECHANICAL VENTILATION  Goal: Tracheostomy will be managed safely  3/5/2021 1856 by Brandon Mauricio RN  Outcome: Ongoing  3/5/2021 0820 by Delon Smith RCP  Outcome: Ongoing  Goal: Ability to express needs and understand communication  3/5/2021 1856 by Brandon Mauricio RN  Outcome: Ongoing  3/5/2021 0820 by Delon Smith RCP  Outcome: Ongoing  Goal: Mobility/activity is maintained at optimum level for patient  3/5/2021 1856 by Brandon Mauricio RN  Outcome: Ongoing  3/5/2021 0820 by Delon Smith RCP  Outcome: Ongoing     Problem: Confusion - Acute:  Goal: Absence of continued neurological deterioration signs and symptoms  Description: Absence of continued neurological deterioration signs and symptoms  Outcome: Ongoing  Goal: Mental status will be restored to baseline  Description: Mental status will be restored to baseline  Outcome: Ongoing     Problem: Discharge Planning:  Goal: Ability to perform activities of daily living will improve  Description: Ability to perform activities of daily living will improve  Outcome: Ongoing  Goal: Participates in care planning  Description: Participates in care planning  Outcome: Ongoing     Problem: Mood - Altered:  Goal: Mood stable  Description: Mood stable  Outcome: Ongoing  Goal: Absence of abusive behavior  Description: Absence of abusive behavior  Outcome: Ongoing  Goal: Verbalizations of feeling emotionally comfortable while being cared for will increase  Description: Verbalizations of feeling emotionally comfortable while being cared for will increase  Outcome: Ongoing     Problem: Psychomotor Activity - Altered: Goal: Absence of psychomotor disturbance signs and symptoms  Description: Absence of psychomotor disturbance signs and symptoms  Outcome: Ongoing     Problem: Sensory Perception - Impaired:  Goal: Demonstrations of improved sensory functioning will increase  Description: Demonstrations of improved sensory functioning will increase  Outcome: Ongoing  Goal: Decrease in sensory misperception frequency  Description: Decrease in sensory misperception frequency  Outcome: Ongoing  Goal: Able to refrain from responding to false sensory perceptions  Description: Able to refrain from responding to false sensory perceptions  Outcome: Ongoing  Goal: Demonstrates accurate environmental perceptions  Description: Demonstrates accurate environmental perceptions  Outcome: Ongoing  Goal: Able to distinguish between reality-based and nonreality-based thinking  Description: Able to distinguish between reality-based and nonreality-based thinking  Outcome: Ongoing  Goal: Able to interrupt nonreality-based thinking  Description: Able to interrupt nonreality-based thinking  Outcome: Ongoing     Problem: Sleep Pattern Disturbance:  Goal: Appears well-rested  Description: Appears well-rested  Outcome: Ongoing

## 2021-03-05 NOTE — PLAN OF CARE
Problem: Skin Integrity:  Goal: Will show no infection signs and symptoms  Description: Will show no infection signs and symptoms  3/5/2021 0820 by Spencer Van RCP  Outcome: Ongoing  3/5/2021 0144 by Harry Christian RN  Outcome: Ongoing  Goal: Absence of new skin breakdown  Description: Absence of new skin breakdown  3/5/2021 0820 by Spencer Van, RCP  Outcome: Ongoing  3/5/2021 0144 by Harry Christian RN  Outcome: Ongoing     Problem: OXYGENATION/RESPIRATORY FUNCTION  Goal: Patient will maintain patent airway  3/5/2021 0820 by Spencer Van RCP  Outcome: Ongoing  3/5/2021 0144 by Harry Christian RN  Outcome: Ongoing  3/4/2021 1930 by Hi Watkins RCP  Outcome: Ongoing  Goal: Patient will achieve/maintain normal respiratory rate/effort  Description: Respiratory rate and effort will be within normal limits for the patient  3/5/2021 0820 by Spencer Van RCP  Outcome: Ongoing  3/5/2021 0144 by Harry Christian RN  Outcome: Ongoing  3/4/2021 1930 by Hi Watkins RCP  Outcome: Ongoing     Problem: MECHANICAL VENTILATION  Goal: Patient will maintain patent airway  3/5/2021 0820 by Spencer Van RCP  Outcome: Ongoing  3/5/2021 0144 by Harry Christian RN  Outcome: Ongoing  3/4/2021 1930 by Hi Watkins RCP  Outcome: Ongoing  Goal: Oral health is maintained or improved  3/5/2021 0820 by Spencer Van RCP  Outcome: Ongoing  3/5/2021 0144 by Harry Christian RN  Outcome: Ongoing  3/4/2021 1930 by Hi Watkins RCP  Outcome: Ongoing  Goal: Tracheostomy will be managed safely  3/5/2021 0820 by Spencer Van RCP  Outcome: Ongoing  3/5/2021 0144 by Harry Christian RN  Outcome: Ongoing  3/4/2021 1930 by Hi Watkins RCP  Outcome: Ongoing  Goal: ET tube will be managed safely  3/5/2021 0820 by FANTA SchreiberP  Outcome: Ongoing  3/5/2021 0144 by Harry Christian RN  Outcome: Ongoing  3/4/2021 1930 by Hi Watkins RCP  Outcome: Ongoing  Goal: Ability to express needs and understand communication  3/5/2021 0820 by Hal Faustin RCP  Outcome: Ongoing  3/5/2021 0144 by Fred Vásquez RN  Outcome: Ongoing  3/4/2021 1930 by Johnathon Trejo RCP  Outcome: Ongoing  Goal: Mobility/activity is maintained at optimum level for patient  3/5/2021 0820 by Hal Faustin RCP  Outcome: Ongoing  3/5/2021 0144 by Fred Vásquez RN  Outcome: Ongoing  3/4/2021 1930 by Johnathon Trejo RCP  Outcome: Ongoing

## 2021-03-05 NOTE — PROGRESS NOTES
Physician Progress Note      Beulah Lim  [facility] #:                  [id number]  :                       1980  ADMIT DATE:       2021 7:38 AM  DISCH DATE:  RESPONDING  PROVIDER #:        Mauricio Stroud MD          QUERY TEXT:    Patient admitted with perforated bowel. Noted documentation of acute   respiratory failure in 3/2 intubation procedure note and Intubated 3/2 to   protect airway in 3/ surgery progress note. If possible, please document in progress notes and discharge summary the   reason for intubation: The medical record reflects the following:  Risk Factors: withdrawal  Clinical Indicators: 3/2 RR 24-35 with O2 Sat 91% on 6L/nc - changed to 50%   ventimask  3/2 Subsequent increase RR 40/min  3/2 Venous blood gas: pH 7.50, pCO2 30.2, pO2 45.3, HCO3 23.3, Base excess   +1.0  3/2 Surgery progress note: Worsening respiratory distress; Respiratory status   and severe agitation, decision made to intubate in order to protect airway and   aggressively treat agitation that is likely from withdrawal.  3 Procedure note: Emergency intubation  Pre/Post diagnosis: Acute respiratory failure  3/5 Remains on Vent FIO2 30% with RR 17-25, O2 sat 92-99%  Treatment: Intubation, vent    Thank you, Jhoan Rangel RN, CDS. Please call with any questions, [phone],   M-F 6a-2:30p. Options provided:  -- Acute respiratory failure confirmed  -- Intubated for airway protection only, acute respiratory failure was ruled   out  -- Other - I will add my own diagnosis  -- Disagree - Not applicable / Not valid  -- Disagree - Clinically unable to determine / Unknown  -- Refer to Clinical Documentation Reviewer    PROVIDER RESPONSE TEXT:    This patient was intubated for airway protection only, acute respiratory   failure was ruled out.     Query created by: Concha Eli on 3/5/2021 2:16 PM      Electronically signed by:  Mauricio Stroud MD 3/5/2021 2:22 PM

## 2021-03-06 NOTE — PLAN OF CARE
Problem: Skin Integrity:  Goal: Will show no infection signs and symptoms  Description: Will show no infection signs and symptoms  3/6/2021 0538 by Kaylie Berger RN  Outcome: Ongoing  3/5/2021 1856 by Shannan Jordan RN  Outcome: Met This Shift  Goal: Absence of new skin breakdown  Description: Absence of new skin breakdown  3/6/2021 0538 by Kaylie Berger RN  Outcome: Ongoing  3/5/2021 1856 by Shannan Jordan RN  Outcome: Met This Shift     Problem: Falls - Risk of:  Goal: Will remain free from falls  Description: Will remain free from falls  3/6/2021 0538 by Kaylie Berger RN  Outcome: Ongoing  3/5/2021 1856 by Shannan Jordan RN  Outcome: Met This Shift  Goal: Absence of physical injury  Description: Absence of physical injury  3/6/2021 0538 by Kaylie Berger RN  Outcome: Ongoing  3/5/2021 1856 by Shannan Jordan RN  Outcome: Met This Shift     Problem: Non-Violent Restraints  Goal: Removal from restraints as soon as assessed to be safe  3/6/2021 0538 by Kaylie Berger RN  Outcome: Ongoing  3/5/2021 1856 by Shannan Jordan RN  Outcome: Ongoing  Goal: No harm/injury to patient while restraints in use  3/6/2021 0538 by Kaylie Berger RN  Outcome: Ongoing  3/5/2021 1856 by Shannan Jordan RN  Outcome: Met This Shift  Goal: Patient's dignity will be maintained  3/6/2021 0538 by Kaylie Berger RN  Outcome: Ongoing  3/5/2021 1856 by Shannan Jordan RN  Outcome: Met This Shift     Problem: OXYGENATION/RESPIRATORY FUNCTION  Goal: Patient will maintain patent airway  3/6/2021 0538 by Kaylie Berger RN  Outcome: Ongoing  3/5/2021 1856 by Shannan Jordan RN  Outcome: Met This Shift  Goal: Patient will achieve/maintain normal respiratory rate/effort  Description: Respiratory rate and effort will be within normal limits for the patient  3/6/2021 0538 by Kaylie Berger RN  Outcome: Ongoing  3/5/2021 1856 by Shannan Jordan RN  Outcome: Ongoing     Problem: MECHANICAL VENTILATION  Goal: Patient will maintain patent airway  3/6/2021 0538 by Whitney Urena YOLA Yen  Outcome: Ongoing  3/5/2021 1856 by Dorie Weinberg RN  Outcome: Met This Shift  Goal: Oral health is maintained or improved  3/6/2021 0538 by Catherine Pond RN  Outcome: Ongoing  3/5/2021 1856 by Dorie Weinberg RN  Outcome: Met This Shift  Goal: Tracheostomy will be managed safely  3/6/2021 0538 by Catherine Pond RN  Outcome: Ongoing  3/5/2021 1856 by Dorie Weinberg RN  Outcome: Ongoing  Goal: ET tube will be managed safely  3/6/2021 0538 by Catherine Pond RN  Outcome: Ongoing  3/5/2021 1856 by Dorie Weinberg RN  Outcome: Met This Shift  Goal: Ability to express needs and understand communication  3/6/2021 0538 by Catherine Pond RN  Outcome: Ongoing  3/5/2021 1856 by Dorie Weinberg RN  Outcome: Ongoing  Goal: Mobility/activity is maintained at optimum level for patient  3/6/2021 0538 by Catherine Pond RN  Outcome: Ongoing  3/5/2021 1856 by Dorie Weinberg RN  Outcome: Ongoing     Problem: Confusion - Acute:  Goal: Absence of continued neurological deterioration signs and symptoms  Description: Absence of continued neurological deterioration signs and symptoms  3/6/2021 0538 by Catherine Pond RN  Outcome: Ongoing  3/5/2021 1856 by Dorie Weinberg RN  Outcome: Ongoing  Goal: Mental status will be restored to baseline  Description: Mental status will be restored to baseline  3/6/2021 0538 by Catherine Pond RN  Outcome: Ongoing  3/5/2021 1856 by Dorie Weinberg RN  Outcome: Ongoing     Problem: Discharge Planning:  Goal: Ability to perform activities of daily living will improve  Description: Ability to perform activities of daily living will improve  3/6/2021 0538 by Catherine Pond RN  Outcome: Ongoing  3/5/2021 1856 by Dorie Weinberg RN  Outcome: Ongoing  Goal: Participates in care planning  Description: Participates in care planning  3/6/2021 0538 by Catherine Pond RN  Outcome: Ongoing  3/5/2021 1856 by Dorie Weinberg RN  Outcome: Ongoing     Problem: Injury - Risk of, Physical Injury:  Goal: Will remain free from falls by Luther Beltran RN  Outcome: Ongoing  3/5/2021 1856 by Claudine Upton RN  Outcome: Ongoing  Goal: Demonstrates accurate environmental perceptions  Description: Demonstrates accurate environmental perceptions  3/6/2021 0538 by Luther Beltran RN  Outcome: Ongoing  3/5/2021 1856 by Claudine Upton RN  Outcome: Ongoing  Goal: Able to distinguish between reality-based and nonreality-based thinking  Description: Able to distinguish between reality-based and nonreality-based thinking  3/6/2021 0538 by Luther Beltran RN  Outcome: Ongoing  3/5/2021 1856 by Claudine Upton RN  Outcome: Ongoing  Goal: Able to interrupt nonreality-based thinking  Description: Able to interrupt nonreality-based thinking  3/6/2021 0538 by Luther Beltran RN  Outcome: Ongoing  3/5/2021 1856 by Claudine Upton RN  Outcome: Ongoing     Problem: Sleep Pattern Disturbance:  Goal: Appears well-rested  Description: Appears well-rested  3/6/2021 0538 by Luther Beltran RN  Outcome: Ongoing  3/5/2021 1856 by Claudine Upton RN  Outcome: Ongoing

## 2021-03-06 NOTE — FLOWSHEET NOTE
03/06/21 0730   Provider Notification   Reason for Communication Evaluate; Review case   Provider Name Dr. Thalia Levy   Provider Notification Physician   Method of Communication Face to face   Response See orders   Notification Time Vicky Cadena RN

## 2021-03-06 NOTE — PROGRESS NOTES
ICU PROGRESS NOTE        PATIENT NAME: Lily Headings RECORD NO. [id number]  DATE: 3/6/2021    PRIMARY CARE PHYSICIAN: Barbra Morris, DO    HD: # 6    ASSESSMENT    Patient Active Problem List   Diagnosis    History of colon polyps    Perforated bowel ([facility] [address]    Perforated rectum [facility])       MEDICAL DECISION MAKING AND PLAN  1. Neuro:  1. Hx of cocaine abuse, concern for drug withdrawal   2. Agitation/delirium   1. Intubated 3/ to protect airway   2. Off fentanyl gtt  3. Continue precedex gtt (1.4)  4. Continue with prn Haldo  5. Decrease valium from 10 to 5mg q6  3. Pain management   1. Continue with MMPT and ketamine gtt  4. Continue with thiamine and folate supplementation   2. CV  1. HR   2. MAP 67-90  3. Lactic acid 0.62  4. Off levophed gtt  5. Hx of cocaine abuse     3. Pulm  1. Initially intubated this am and was extubated at approximately 0800  2. APRV: 26/0  3. PF ratio 248  4. AB.523/32.9/74.5/27.1  5. CXR: stable when compared to yesterday, stable right mild pleural effusion   4. GI/Nutrition  1. Ex lap ovalles's procedure, creation of end colostomy ()   2. Continue with pepcid 20mg    3. Tube feeds at goal 50cc/hr  5. Renal/lytes  1. Potassium 3.6 - repletion was given   2. Hypocalemia - was replaced 3/  3. Mg - replacement given, repeat Mg ordered for tomorrow am    4. Total fluid goal of 125cc/hr  5. Urine output 0.6-0.7cc/kg/hr  6. Total Positive since admission is 16,700  1. Lasix 40mg IV given this am and will repeat early afternoon and assess a potential third dose this pm  6. Heme  1. DVT prophylaxis- levonox 40mg daily  2. Hgb 7.6 was 7.6 yesterday   7.   Endocrine        1. Glucose 123   7. Musculoskeletal  1. PT/OT   8. Skin  1. No abrasions or lacerations   2. Abdominal incision is clean dry and intact, some minimal erythema surrounding site  9. Micro  1. WBC 12.7 from 13.5   2. procalcitonin 1.13 down from 1.81   3. Tmax 101.2   10. Family/dispo  1.  Will continue to wean off ventilatory and sedation as patient tolerates, family updated   11. Lines  1. A-line L radial   2. CVC R IJ  3. NG      CHECKLIST    CAM-ICU RASS: +2  RESTRAINTS: y  IVF: n  NUTRITION: tube feeds at goal  ANTIBIOTICS: n  GI: pepcid  DVT: Lovenox   GLYCEMIC CONTROL: none  HOB >45: y  MOBILITY: PT/OT  SBT: successful  IS: will attempt now that he is extubated    SARINA Lora had some overnight agitation and was given haldo 5mg twice, this am he was alert and following commands, passed SBT and was able to be extubated. Since extubation is doing well on Ra. Will monitor closely and assess supplemental oxygenation needs, as well as assess IS. OBJECTIVE  VITALS: Temp: Temp: 99.6 °F (37.6 °C)Temp  Av.8 °F (38.2 °C)  Min: 99.6 °F (37.6 °C)  Max: 101.4 °F (38.6 °C) BP No data recorded. No data recorded.    Pulse Pulse  Av.8  Min: 77  Max: 107 Resp Resp  Av.1  Min: 12  Max: 29 Pulse ox SpO2  Av.9 %  Min: 92 %  Max: 100 %    Physical Exam  Constitutional:      extubated, awake and alert and following commands   HENT:      Head: normocephalic atraumatic   Eyes:      Pupils: equal round and reactive to light, no conjunctival pallor, no scleral icterus   Cardiovascular:      Rate and Rhythm: regular rate and rhythm      Pulses: RP 2+ bilaterally, DP pulses 2+ bilaterally  Pulmonary:      Effort: extubated, no accessory muscle use     Breath sounds: diminished bilaterally, no rhonchi or wheezing   Abdominal:      General: ostomy site is well appearing no surrounding erythema or fluctuance noted, some mild erythema noted around ex lap site no areas of fluctuance     Palpations: soft, non tender, minimally distended  Extremities:      1+ bilateral upper extremity edema   Skin:     General: warm and dry     LAB:  CBC:   Recent Labs     21  0400 21  0509 21  0455   WBC 13.7* 13.5* 12.7*   HGB 8.7* 7.6* 7.6*   HCT 26.1* 24.0* 23.9*   MCV 92.6 96.4 94.5    277 326 BMP:   Recent Labs     03/04/21  0400 03/05/21  0509 03/06/21  0455    141 145*   K 3.4* 3.7 3.6*    109* 111*   CO2 24 24 23   BUN 18 20 19   CREATININE 0.74 0.78 0.66*   GLUCOSE 98 108* 110*         RADIOLOGY:  CXR:   Impression   Stable exam since yesterday.        Lan Rascon MD  3/6/21, 8:50 AM

## 2021-03-06 NOTE — PROGRESS NOTES
Order obtained for extubation. SpO2 of 100 on 30% FiO2. Patient extubated and placed on room air. Post extubation SpO2 is 95% with HR  91 bpm and RR 21 breaths/min. Patient had mild cough that was productive of white and yellow sputum. Extubation Well tolerated by patient. .     Lennie Blake   8:15 AM

## 2021-03-06 NOTE — PLAN OF CARE
Problem: Non-Violent Restraints  Goal: Removal from restraints as soon as assessed to be safe  3/6/2021 1154 by Roseann Leiva RN  Outcome: Completed  3/6/2021 0538 by Omar Villasenor RN  Outcome: Ongoing  Goal: No harm/injury to patient while restraints in use  3/6/2021 1154 by Roseann Leiva RN  Outcome: Completed  3/6/2021 0538 by Omar Villasenor RN  Outcome: Ongoing  Goal: Patient's dignity will be maintained  3/6/2021 1154 by Roseann Leiva RN  Outcome: Completed  3/6/2021 0538 by Omar Villasenor RN  Outcome: Ongoing    Removed at 0815 following extubation

## 2021-03-06 NOTE — FLOWSHEET NOTE
03/06/21 4171   Provider Notification   Reason for Communication Evaluate; Review case   Provider Name Dr. Radha Jose   Provider Notification Physician   Method of Communication Face to face   Response See orders   Notification Time 0925     Raffy Gonzalez RN   Art line discussed and removed

## 2021-03-07 NOTE — PLAN OF CARE
3/6/2021 0538 by Ortiz Castle RN  Outcome: Ongoing     Problem: Sensory Perception - Impaired:  Goal: Demonstrations of improved sensory functioning will increase  Description: Demonstrations of improved sensory functioning will increase  3/6/2021 1901 by Mone Myers RN  Outcome: Met This Shift  3/6/2021 0538 by Ortiz Castle RN  Outcome: Ongoing  Goal: Decrease in sensory misperception frequency  Description: Decrease in sensory misperception frequency  3/6/2021 1901 by Mone Myers RN  Outcome: Met This Shift  3/6/2021 0538 by Ortiz Castle RN  Outcome: Ongoing     Problem: Skin Integrity:  Goal: Will show no infection signs and symptoms  Description: Will show no infection signs and symptoms  3/6/2021 1901 by Mone Myers RN  Outcome: Ongoing  3/6/2021 0538 by Ortiz Castle RN  Outcome: Ongoing     Problem: OXYGENATION/RESPIRATORY FUNCTION  Goal: Patient will achieve/maintain normal respiratory rate/effort  Description: Respiratory rate and effort will be within normal limits for the patient  3/6/2021 1901 by Mone Myers RN  Outcome: Ongoing  3/6/2021 0816 by Silvia Santos RCP  Outcome: Ongoing  3/6/2021 0538 by Ortiz Castle RN  Outcome: Ongoing     Problem: MECHANICAL VENTILATION  Goal: Tracheostomy will be managed safely  3/6/2021 1901 by Mone Myers RN  Outcome: Ongoing  3/6/2021 0816 by Silvia Santos RCP  Outcome: Ongoing  3/6/2021 0538 by Ortiz Castle RN  Outcome: Ongoing  Goal: Ability to express needs and understand communication  3/6/2021 1901 by Mone Myers RN  Outcome: Ongoing  3/6/2021 0816 by Silvia Santos RCP  Outcome: Ongoing  3/6/2021 0538 by Ortiz Castle RN  Outcome: Ongoing  Goal: Mobility/activity is maintained at optimum level for patient  3/6/2021 1901 by Mone Myers RN  Outcome: Ongoing  3/6/2021 0816 by Silvia Santos RCP  Outcome: Ongoing  3/6/2021 0538 by Ortiz Castle RN  Outcome: Ongoing     Problem: Confusion - Acute:  Goal: Absence of continued neurological deterioration signs and symptoms  Description: Absence of continued neurological deterioration signs and symptoms  3/6/2021 1901 by Kailyn Diehl RN  Outcome: Ongoing  3/6/2021 0538 by Erna Frazier RN  Outcome: Ongoing  Goal: Mental status will be restored to baseline  Description: Mental status will be restored to baseline  3/6/2021 1901 by Kailyn Diehl RN  Outcome: Ongoing  3/6/2021 0538 by Erna Frazier RN  Outcome: Ongoing     Problem: Discharge Planning:  Goal: Ability to perform activities of daily living will improve  Description: Ability to perform activities of daily living will improve  3/6/2021 1901 by Kailyn Diehl RN  Outcome: Ongoing  3/6/2021 0538 by Erna Frazier RN  Outcome: Ongoing  Goal: Participates in care planning  Description: Participates in care planning  3/6/2021 1901 by Kailyn Diehl RN  Outcome: Ongoing  3/6/2021 0538 by Erna Frazier RN  Outcome: Ongoing     Problem: Mood - Altered:  Goal: Mood stable  Description: Mood stable  3/6/2021 1901 by Kailyn Diehl RN  Outcome: Ongoing  3/6/2021 0538 by Erna Frazier RN  Outcome: Ongoing  Goal: Verbalizations of feeling emotionally comfortable while being cared for will increase  Description: Verbalizations of feeling emotionally comfortable while being cared for will increase  3/6/2021 1901 by Kailyn Diehl RN  Outcome: Ongoing  3/6/2021 0538 by Erna Frazier RN  Outcome: Ongoing     Problem: Psychomotor Activity - Altered:  Goal: Absence of psychomotor disturbance signs and symptoms  Description: Absence of psychomotor disturbance signs and symptoms  3/6/2021 1901 by Kailyn Diehl RN  Outcome: Ongoing  3/6/2021 0538 by Erna Frazier RN  Outcome: Ongoing     Problem: Sensory Perception - Impaired:  Goal: Able to refrain from responding to false sensory perceptions  Description: Able to refrain from responding to false sensory perceptions  3/6/2021 1901 by Kailyn Diehl RN  Outcome: Ongoing  3/6/2021 0538 by Erna Frazier RN  Outcome: Ongoing Goal: Demonstrates accurate environmental perceptions  Description: Demonstrates accurate environmental perceptions  3/6/2021 1901 by Nabil Steel RN  Outcome: Ongoing  3/6/2021 0538 by Jordis Ahumada, RN  Outcome: Ongoing  Goal: Able to distinguish between reality-based and nonreality-based thinking  Description: Able to distinguish between reality-based and nonreality-based thinking  3/6/2021 1901 by Nabil Steel RN  Outcome: Ongoing  3/6/2021 0538 by Jordis Ahumada, RN  Outcome: Ongoing  Goal: Able to interrupt nonreality-based thinking  Description: Able to interrupt nonreality-based thinking  3/6/2021 1901 by Nabil Steel RN  Outcome: Ongoing  3/6/2021 0538 by Jordis Ahumada, RN  Outcome: Ongoing     Problem: Sleep Pattern Disturbance:  Goal: Appears well-rested  Description: Appears well-rested  3/6/2021 1901 by Nabil Steel RN  Outcome: Ongoing  3/6/2021 0538 by Jordis Ahumada, RN  Outcome: Ongoing     Problem: Non-Violent Restraints  Goal: Removal from restraints as soon as assessed to be safe  3/6/2021 1154 by Nabil Steel RN  Outcome: Completed  3/6/2021 0538 by Jordis Ahumada, RN  Outcome: Ongoing  Goal: No harm/injury to patient while restraints in use  3/6/2021 1154 by Nabil Steel RN  Outcome: Completed  3/6/2021 0538 by Jordis Ahumada, RN  Outcome: Ongoing  Goal: Patient's dignity will be maintained  3/6/2021 1154 by Nabil Steel RN  Outcome: Completed  3/6/2021 0538 by Jordis Ahumada, RN  Outcome: Ongoing     Problem: MECHANICAL VENTILATION  Goal: ET tube will be managed safely  3/6/2021 1901 by Nabil Steel RN  Outcome: Completed  3/6/2021 0816 by Kiko Keys RCP  Outcome: Ongoing  3/6/2021 0538 by Jordis Ahumada, RN  Outcome: Ongoing

## 2021-03-07 NOTE — FLOWSHEET NOTE
03/07/21 1000   Family/Significant Other Communication   Reason POA paperwork completed   Name Radha   Relationship Spouse/Significant Other   Response  at bedside to complete   Method of Communication In [address], [facility]

## 2021-03-07 NOTE — ANESTHESIA PRE PROCEDURE
Department of Anesthesiology  Preprocedure Note       Name:  Reina Msihra   Age:  36 y.o.  :  1980                                          MRN:  [id number]         Date:  3/7/2021      Surgeon: Nena Dobbs):  Steph Gaytan MD    Procedure: Procedure(s):  LAPAROTOMY EXPLORATORY    Medications prior to admission:   Prior to Admission medications    Medication Sig Start Date End Date Taking? Authorizing Provider   promethazine (PHENERGAN) 25 MG tablet Take 25 mg by mouth every 6 hours as needed for Nausea   Yes Historical Provider, MD   oxyCODONE-acetaminophen (PERCOCET) 5-325 MG per tablet Take 1 tablet by mouth every 6 hours as needed for Pain. Yes Historical Provider, MD   ondansetron (ZOFRAN) 4 MG tablet Take 1 tablet by mouth every 8 hours as needed for Nausea 10/13/19  Yes Catherine Kong DO   dicyclomine (BENTYL) 10 MG capsule Take 1 capsule by mouth every 6 hours as needed (cramps) 10/13/19  Yes Catherine Kong DO   acetaminophen (TYLENOL) 325 MG tablet Take 2 tablets by mouth every 6 hours as needed for Pain 10/13/19  Yes Catherine Kong DO   pantoprazole (PROTONIX) 40 MG tablet Take 1 tablet by mouth daily (with breakfast) 19  Yes Faviola Cavazos DO   levothyroxine (SYNTHROID) 75 MCG tablet Take 1 tablet by mouth daily Take with water on an empty stomach- wait 30 minutes before eating or taking other meds.  10/29/19   Faviola Cavazos DO       Current medications:    Current Facility-Administered Medications   Medication Dose Route Frequency Provider Last Rate Last Admin    [MAR Hold] piperacillin-tazobactam (ZOSYN) 4,500 mg in dextrose 5 % 100 mL IVPB (mini-bag)  4,500 mg Intravenous Q8H Josh Harvey, DO        fentaNYL (SUBLIMAZE) 100 MCG/2ML injection             [MAR Hold] diazePAM (VALIUM) tablet 5 mg  5 mg Oral Q6H Nicole Davis MD   5 mg at 21 7501    [MAR Hold] lactated ringers infusion   Intravenous Continuous Nicole Davis  mL/hr at 21 2868 New Bag at 03/06/21 2157   [facility] Hold] thiamine tablet 250 mg  250 mg Oral Daily Jennifer [place], DO   250 mg at 03/06/21 0802    [MAR Hold] oxyCODONE (ROXICODONE) immediate release tablet 5 mg  5 mg Oral Q6H PRN Cheril Rota, DO   5 mg at 03/06/21 1754    [MAR Hold] albuterol (PROVENTIL) nebulizer solution 2.5 mg  2.5 mg Nebulization Q6H PRN Liz Louis MD        [facility] Hold] QUEtiapine (SEROQUEL) tablet 75 mg  75 mg Oral BID Jennifer Jameel, DO   75 mg at 03/06/21 2002    [MAR Hold] gabapentin (NEURONTIN) 250 MG/5ML solution 300 mg  300 mg Oral 3 times per day Cheril Rota, DO   300 mg at 03/06/21 2204    [MAR Hold] ketamine (KETALAR) 500 mg in sodium chloride 0.9 % 250 mL infusion  0.2 mg/kg/hr (Ideal) Intravenous Continuous Cheril Rota, DO 7.1 mL/hr at 03/07/21 0025 0.2 mg/kg/hr at 03/07/21 0025    [MAR Hold] ibuprofen (ADVIL;MOTRIN) tablet 400 mg  400 mg Oral Q4H PRN Cheril Rota, DO   400 mg at 03/06/21 1348    [MAR Hold] folic acid (FOLVITE) tablet 1 mg  1 mg Oral Daily Cheril Rota, DO   1 mg at 03/06/21 0802    [MAR Hold] famotidine (PEPCID) tablet 20 mg  20 mg Oral BID Cheril Rota, DO   20 mg at 03/06/21 2002    [MAR Hold] PARoxetine (PAXIL) tablet 20 mg  20 mg Oral Daily Carmen XIOMARA PatelN - CNP   20 mg at 03/06/21 0802    [MAR Hold] levothyroxine (SYNTHROID) tablet 75 mcg  75 mcg Per NG tube Daily Carmenrubin Patel APRN - CNP   75 mcg at 03/07/21 0520    [MAR Hold] dexmedetomidine (PRECEDEX) 400 mcg in sodium chloride 0.9 % 100 mL infusion  0.2-1.4 mcg/kg/hr Intravenous Continuous Buck Lute, DO   Stopped at 03/07/21 0725    [MAR Hold] sodium chloride flush 0.9 % injection 10 mL  10 mL Intravenous 2 times per day Antonietta Alejandre MD   10 mL at 03/04/21 2001    [MAR Hold] sodium chloride flush 0.9 % injection 10 mL  10 mL Intravenous PRN Antonietta Alejandre MD        [facility] Hold] sodium chloride flush 0.9 % injection 10 mL  10 mL Intravenous 2 times per day Leila Josue ANGELA Tong, DO   10 mL at 03/06/21 2002   [facility] Hold] sodium chloride flush 0.9 % injection 10 mL  10 mL Intravenous PRN Nicholette Havers, DO        [MAR Hold] ondansetron (ZOFRAN-ODT) disintegrating tablet 4 mg  4 mg Oral Q8H PRN Nicholette Havers, DO   4 mg at 03/06/21 1239    Or    [MAR Hold] ondansetron (ZOFRAN) injection 4 mg  4 mg Intravenous Q6H PRN Nicholette Havers, DO   4 mg at 03/07/21 0452    [MAR Hold] enoxaparin (LOVENOX) injection 40 mg  40 mg Subcutaneous Daily Chavez Bishop DO   40 mg at 03/06/21 0802    [MAR Hold] acetaminophen (TYLENOL) 160 MG/5ML solution 1,000 mg  1,000 mg Oral Q8H Lui Sneed MD   1,000 mg at 03/07/21 0520       Allergies:  No Known Allergies    Problem List:    Patient Active Problem List   Diagnosis Code    History of colon polyps Z86.010    Perforated bowel ([facility] [address] K63.1    Perforated rectum ([facility] [address] K63.1       Past Medical History:        Diagnosis Date    History of colon polyps 10/25/2019    tubular adenoma    IBS (irritable bowel syndrome)        Past Surgical History:        Procedure Laterality Date    ABDOMEN SURGERY  02/28/2021    LAPAROTOMY, MOBILIZATION OF FALSIFORM LIGAMENT, CATTELL-BRESCH MANEUVER, DINA MANEUVER, CONTROL OF PELVIC HEMORRHAGE, GREENE'S PROCEDURE, KOCHERIZATION OF DUODENUM, CREATION OF END COLOSTOMY (N/A )    COLONOSCOPY N/A 10/25/2019    COLONOSCOPY WITH BIOPSY and cold snare performed by Mario Khan MD at [address] [place] [address] 2/28/2021    EXPLORATORY LAPAROTOMY, MOBILIZATION OF FALSIFORM LIGAMENT, CATTELL-BRESCH MANEUVER, DINA MANEUVER, CONTROL OF PELVIC HEMORRHAGE, GREENE'S PROCEDURE, KOCHERIZATION OF DUODENUM, CREATION OF END COLOSTOMY performed by Lena Sheppard MD at [address] [place] [address] History:    Social History     Tobacco Use    Smoking status: Current Every Day Smoker     Packs/day: 0.50     Years: 15.00     Pack years: 7.50     Types: Cigarettes    Smokeless tobacco: Never Used   Substance Use Topics    Alcohol use: No     Frequency: Never                                Ready to quit: Not Answered  Counseling given: Not Answered      Vital Signs (Current):   Vitals:    03/07/21 0830 03/07/21 0900 03/07/21 0930 03/07/21 1000   BP:  133/72  134/83   Pulse: 91 102 96 96   Resp: 28 (!) 35 26 29   Temp:       TempSrc:       SpO2: 91% (!) 86% 91% 97%   Weight:       Height:                                                  BP Readings from Last 3 Encounters:   03/07/21 134/83   03/07/21 106/82   02/28/21 (!) 157/106       NPO Status:                                                                                 BMI:   Wt Readings from Last 3 Encounters:   03/05/21 182 lb 5.1 oz (82.7 kg)   10/31/19 161 lb 9.6 oz (73.3 kg)   10/25/19 160 lb (72.6 kg)     Body mass index is 26.92 kg/m².     CBC:   Lab Results   Component Value Date    WBC 13.5 03/07/2021    RBC 3.05 03/07/2021    HGB 9.1 03/07/2021    HCT 29.6 03/07/2021    MCV 97.0 03/07/2021    RDW 13.2 03/07/2021     03/07/2021       CMP:   Lab Results   Component Value Date     03/07/2021    K 3.8 03/07/2021     03/07/2021    CO2 26 03/07/2021    BUN 19 03/07/2021    CREATININE 0.66 03/07/2021    GFRAA >60 03/07/2021    LABGLOM >60 03/07/2021    GLUCOSE 104 03/07/2021    PROT 5.0 03/03/2021    CALCIUM 8.4 03/07/2021    BILITOT 0.50 03/03/2021    ALKPHOS 44 03/03/2021    AST 38 03/03/2021    ALT 30 03/03/2021       POC Tests:   Recent Labs     03/06/21  0635   POCGLU 123*       Coags:   Lab Results   Component Value Date    PROTIME 11.6 02/28/2021    INR 1.1 02/28/2021    APTT 24.9 02/28/2021       HCG (If Applicable): No results found for: PREGTESTUR, PREGSERUM, HCG, HCGQUANT     ABGs:   Lab Results   Component Value Date    PHART 7.397 02/28/2021    PO2ART 176.0 02/28/2021    RNW7ASI 36.2 02/28/2021    MOQ9GMS 21.8 02/28/2021    W4GLKCPQ 99.2 02/28/2021        Type & Screen (If Applicable):  No results found for: Karon Camejo Drug/Infectious Status (If Applicable):  No results found for: HIV, HEPCAB    COVID-19 Screening (If Applicable):   Lab Results   Component Value Date    COVID19 Not Detected 02/28/2021         Anesthesia Evaluation  Patient summary reviewed and Nursing notes reviewed no history of anesthetic complications:   Airway: Mallampati: II  TM distance: >3 FB   Neck ROM: full  Mouth opening: > = 3 FB Dental: normal exam         Pulmonary:Negative Pulmonary ROS and normal exam                               Cardiovascular:  Exercise tolerance: good (>4 METS),                     Neuro/Psych:                ROS comment: Polysubstance abuse GI/Hepatic/Renal: Neg GI/Hepatic/Renal ROS            Endo/Other: Negative Endo/Other ROS                    Abdominal:       Abdomen: tender. Vascular: negative vascular ROS. Anesthesia Plan      general     ASA 3 - emergent     (NG in situ from ICU. T&S sample taken and sent/ order placed (stat))  Induction: intravenous. CVP  MIPS: Postoperative opioids intended and Postoperative trial extubation. Anesthetic plan and risks discussed with patient. Use of blood products discussed with patient whom consented to blood products. Plan discussed with CRNA.                 Marcell Chavez MD   3/7/2021

## 2021-03-07 NOTE — PROGRESS NOTES
ICU PROGRESS NOTE        PATIENT NAME: Nish Anderson RECORD NO. [id number]  DATE: 3/7/2021    PRIMARY CARE PHYSICIAN: Ramu Gabriel DO    HD: # 7    ASSESSMENT    Patient Active Problem List   Diagnosis    History of colon polyps    Perforated bowel ([facility] [address]    Perforated rectum [facility])       MEDICAL DECISION MAKING AND PLAN  1. Neuro:  1. Hx of cocaine abuse, concern for drug withdrawal   2. Agitation/delirium   1. Intubated 3/2 to protect airway   2. Off fentanyl gtt  3. Continue to wean precedex gtt off currently (0.6)  4. Continue with prn Haldo  5. Valium from 5mg q6  3. Pain management   1. Continue with MMPT and ketamine gtt  4. Continue with thiamine and folate supplementation   2. CV  1. HR   2. MAP 74-90  3. Lactic acid 0.62 (3/6)  4. Off levophed gtt  5. Hx of cocaine abuse     3. Pulm  1. Extubated, saturations 90-93% on 2L NC   2. CXR: slight worsening of right pleural effusion, with some mild to mod pulm vasc congestion      4. GI/Nutrition  1. Ex lap ovalles's procedure, creation of end colostomy (2/28)   2. Continue with pepcid 20mg    3. Tube feeds held last night due to distension and emesis, will restart at goal 50cc/hr    5. Renal/lytes  1. Potassium 3.8 - repletion ordered   2. Hypocalemia - was replaced 3/4  3. Mg - 1.9 - repletion ordred     4. Total fluid goal of 125cc/hr  5. Urine output 1.9cc/kg/hr  6. Total since admission (+12,693)  1. Lasix 40mg IV given this am and will repeat early afternoon and assess a potential third dose this pm  6. Heme  1. DVT prophylaxis- levonox 40mg daily  2. Hgb 9.1 up from 7.6    7.   Endocrine        1. Glucose 123     7. Musculoskeletal  1. PT/OT     8. Skin  1. No abrasions or lacerations   2. Abdominal incision - some surrounding erythema, no fluctuance noted, some serous drainage coming from incision site     9. Micro  1. WBC 13.5 up from 12.7   2. procalcitonin - yesterday 1.13 down from 1.81   3. Tmax 101.2     10. Family/dispo  1.  Will continue to wean off ventilatory and sedation as patient tolerates, family updated     11. Lines  1. A-line (L) radial   2. CVC (R) IJ  3. NG  4. Negrete       CHECKLIST    CAM-ICU RASS: +2  RESTRAINTS: n  IVF: 125cc/hr LR  NUTRITION: tube feeds   ANTIBIOTICS: n  GI: pepcid  DVT: lovenox  GLYCEMIC CONTROL: none  HOB >45: y  MOBILITY: PT/OT   IS: Alyssa was extubated yesterday and had an episode of emesis at 2100 yesterday which required his NGT to be hooked up to suction and removed 1L, during this episode he also had desaturations during this requiring increasing supplemental oxygenation, however once suctioned by RT his saturations improved and went back to NC 2L.        OBJECTIVE  VITALS: Temp: Temp: 100.4 °F (38 °C)Temp  Av.5 °F (37.5 °C)  Min: 98.5 °F (36.9 °C)  Max: 100.4 °F (38 °C) BP Systolic (95LUG), BDX:767 , Min:101 , LYE:720   Diastolic (49KCU), PUN:50, Min:58, Max:77   Pulse Pulse  Av.7  Min: 84  Max: 113 Resp Resp  Av.4  Min: 21  Max: 45 Pulse ox SpO2  Av.6 %  Min: 77 %  Max: 100 %    Physical Exam  Constitutional:      alert, ill appearing  HENT:      Head: normocephalic, atraumatic    Eyes:      Pupils: equal round and reactive to light  Cardiovascular:      Rate and Rhythm: regular rate and rhythm      Pulses: 2+ RP pulses   Pulmonary:      Effort: mild tachypnea, no accessory muscle use      Breath sounds: diminished bilaterally, no rhonchi or wheezing   Abdominal:      General: minimal output from colostomy, surrounding site is intact, there is some surrounding erythema and warmth around ex lap site, some serous/milky drainage from lower third of the incision site     Palpations: minimally distended, diffusely tender to palpation  Skin:     General: warm, diaphoretic     LAB:  CBC:   Recent Labs     21  0509 21  0455 21  0554   WBC 13.5* 12.7* 13.5*   HGB 7.6* 7.6* 9.1*   HCT 24.0* 23.9* 29.6*   MCV 96.4 94.5 97.0    [phone]

## 2021-03-07 NOTE — OP NOTE
Operative Note      Patient: Caitlin Youngblood  YOB: 1980  MRN: [id number]    Date of Procedure: 3/7/2021    Pre-Op Diagnosis: INTRAABDOMINAL SEPSIS, abdominal abscess    Post-Op Diagnosis: Same       Procedure(s):  RE-OPENING OF PREVIOUS LAPAROTOMY, SIGMOID RESECTION, ABDOMINAL LAVAGE, NEGATIVE PRESSURE WOUND THERAPY    Surgeon(s):  Alondra Amezcua MD    Assistant:   Resident: Heidi Waller DO    Anesthesia: General    Estimated Blood Loss (mL): 341BA    Complications: None    Wound class: IV    Specimens:   ID Type Source Tests Collected by Time Destination   A : Distal Sigmoid Colon Tissue Sigmoid Colon SURGICAL PATHOLOGY Alondra Amezcua MD 3/7/2021 1105      Implants:  * No implants in log *      Drains:   Closed/Suction Drain Right RLQ;Abdomen 19 Polish (Active)       Negative Pressure Wound Therapy Abdomen Mid (Active)       NG/OG/NJ/NE Tube Nasogastric Left nostril (Active)   Surrounding Skin Dry; Intact 03/05/21 0400   Securement device Yes 03/07/21 0745   Status Suction-low intermittent 03/07/21 0745   Placement Verified by External Catheter Length;by Gastric Contents 03/07/21 0745   NG/OG/NJ/NE External Measurement (cm) 60 cm 03/07/21 0745   Drainage Appearance Bile;Green 03/07/21 0745   Tube Feeding Immune Enhancing 03/06/21 2000   Tube Feeding Status Stopped 03/06/21 2100   Rate/Schedule 50 mL/hr 03/06/21 2000   Tube Feeding Intake (mL) 137 ml 03/06/21 2000   Free Water Flush (mL) 50 mL 03/06/21 1630   Residual Volume (ml) 10 ml 03/06/21 2000   Output (mL) 500 ml 03/07/21 0400       Colostomy Descending/sigmoid (Active)   Stomal Appliance 1 piece 03/06/21 2000   Stoma  Assessment Protrudes;Red;Moist 03/07/21 0745   Mucocutaneous Junction Intact 03/07/21 0745   Peristomal Assessment Pink; Intact; Clean 03/07/21 0745   Treatment Bag change;Site care 03/04/21 1600   Stool Appearance Watery 03/07/21 0400   Stool Color Brown 03/07/21 0400   Stool Amount Large 03/06/21 1600   Output (mL) 0 ml 03/07/21 0400 Urethral Catheter Intermittent 16 fr (Active)   Catheter Indications Need for fluid management in critically ill patients in a critical care setting not able to be managed by other means such as BSC with hat, bedpan, urinal, condom catheter, or short term intermittent urethral catherization 03/07/21 0745   Site Assessment No urethral drainage 03/07/21 0745   Urine Color Yellow 03/07/21 0745   Urine Appearance Clear 03/07/21 0745   Urine Odor Malodorous 03/06/21 1851   Output (mL) 200 mL 03/07/21 1000       [REMOVED] NG/OG/NJ/NE Tube Nasogastric 18 fr Left mouth (Removed)       [REMOVED] NG/OG/NJ/NE Tube 16 fr Left nostril (Removed)   Surrounding Skin Dry; Intact 03/01/21 0911   Securement device Yes 03/01/21 0911   Status Suction-low intermittent 03/01/21 0911   Placement Verified by X-Ray (Initial) 02/28/21 1445   NG/OG/NJ/NE External Measurement (cm) 55 cm 03/01/21 0911   Drainage Appearance Bloody 03/01/21 0400       [REMOVED] NG/OG/NJ/NE Tube Orogastric (Removed)   Surrounding Skin Dry; Intact 03/04/21 0400   Securement device Yes 03/04/21 0400   Status Other (Comment) 03/04/21 0400   Placement Verified by External Catheter Length 03/04/21 0400   NG/OG/NJ/NE External Measurement (cm) 55 cm 03/04/21 0400   Drainage Appearance Bile; Tan 03/04/21 0705   Tube Feeding Immune Enhancing 03/04/21 0400   Tube Feeding Status Stopped 03/04/21 0555   Rate/Schedule 50 mL/hr 03/04/21 0400   Tube Feeding Intake (mL) 180 ml 03/04/21 0400   Free Water Flush (mL) 80 mL 03/03/21 1600   Residual Volume (ml) 0 ml 03/04/21 0400   Output (mL) 500 ml 03/04/21 4612       Findings: fascial dehiscence and abscess in the pelvis    Detailed Description of Procedure:   Procedure, benefits, risks explained to the patient who understood and consent was obtained. The patient was taken to the operating room and placed in supine position. Pre procedure time out was induced. General anesthesia was induced and an ETT was placed.  NGT remained in place as well as narayanan catheter. The ostomy appliance was removed and it was draped over. Previous staples were removed from the skin. The abdomen was prepped and draped in usual sterile fashion. The previous PDS suture through the fascia was removed. There was noted to be a small dehiscence in the fascia just below the umbilicus where the sutures were pulling apart. The abdomen was entered and there was noted to be fibrinous exudate throughout the small bowel and within the pelvis. The small bowel was adhered together in multiple placed. There was an area of questionable serosal tear when eviscerating he small bowel that was over sewed with silk lembert sutures. Next, the pelvis was examined and the superior portion of the rectal stump appeared to have an area of necrosis. There was no perforation or stool within the pelvis. There was a moderate sized abscess in the pelvis. The abdomen/pelvis was irrigated thoroughly. Using a contour stapler, green load, the area of necrotic appearing rectal stump was stapled across and removed. The staple line has a small area of bleeding that was over sewed with a silk suture. Hemostasis was obtained. Next, the abdomen was once again irrigated. Surgicel powder as well as vistaseal was placed over the rectal stump. A round 19F drain was placed into the pelvis and exited through the RLQ. This was sutured into place using a nylon suture. The small bowel was replaced into the abdomen and the omentum was placed over the bowel. Next, the fascia was closed using three 1-0 looped PDS sutures in running fashion. The fascia was inflamed and thickened. The fascia was closed and a wound vac was placed over the fascia at the midline incision. A white foam followed by a black foam. Good suction was achieved. An ostomy appliance was replaced. The area was cleaned and dried. The patient was awoken in the operating room and taken to PACU in stable condition. He tolerated the procedure well. Dr. Faisal Rangel was present for the entire case.        Electronically signed by En Ignacio DO on 3/7/2021 at 12:30 PM

## 2021-03-07 NOTE — PLAN OF CARE
Problem: Skin Integrity:  Goal: Will show no infection signs and symptoms  Description: Will show no infection signs and symptoms  3/7/2021 0405 by Shar Abreu RN  Outcome: Ongoing  3/6/2021 1901 by Isai Yeager RN  Outcome: Ongoing  Goal: Absence of new skin breakdown  Description: Absence of new skin breakdown  3/7/2021 0405 by Shar Abreu RN  Outcome: Ongoing  3/6/2021 1901 by Isai Yeager RN  Outcome: Met This Shift     Problem: Falls - Risk of:  Goal: Will remain free from falls  Description: Will remain free from falls  3/7/2021 0405 by Shar Abreu RN  Outcome: Ongoing  3/6/2021 1901 by Isai Yeager RN  Outcome: Met This Shift  Goal: Absence of physical injury  Description: Absence of physical injury  3/7/2021 0405 by Shar Abreu RN  Outcome: Ongoing  3/6/2021 1901 by Isai Yeager RN  Outcome: Met This Shift     Problem: OXYGENATION/RESPIRATORY FUNCTION  Goal: Patient will maintain patent airway  3/7/2021 0405 by Shar Abreu RN  Outcome: Ongoing  3/6/2021 1901 by Isai Yeager RN  Outcome: Met This Shift  Goal: Patient will achieve/maintain normal respiratory rate/effort  Description: Respiratory rate and effort will be within normal limits for the patient  3/7/2021 0405 by Shar Abreu RN  Outcome: Ongoing  3/6/2021 1901 by Isai Yeager RN  Outcome: Ongoing     Problem: MECHANICAL VENTILATION  Goal: Patient will maintain patent airway  3/7/2021 0405 by Shar Abreu RN  Outcome: Ongoing  3/6/2021 1901 by Isai Yeager RN  Outcome: Met This Shift  Goal: Oral health is maintained or improved  3/7/2021 0405 by Shar Abreu RN  Outcome: Ongoing  3/6/2021 1901 by Isai Yeager RN  Outcome: Met This Shift  Goal: Tracheostomy will be managed safely  3/7/2021 0405 by Shar Abreu RN  Outcome: Ongoing  3/6/2021 1901 by Isai Yeager RN  Outcome: Ongoing  Goal: Ability to express needs and understand communication  3/7/2021 0405 by Shar Abreu RN  Outcome: Ongoing  3/6/2021 1901 by Isai Yeager, RN  Outcome: Ongoing  Goal: Mobility/activity is maintained at optimum level for patient  3/7/2021 0405 by Jordis Ahumada, RN  Outcome: Ongoing  3/6/2021 1901 by Nabil Steel RN  Outcome: Ongoing     Problem: Confusion - Acute:  Goal: Absence of continued neurological deterioration signs and symptoms  Description: Absence of continued neurological deterioration signs and symptoms  3/7/2021 0405 by Jordis Ahumada, RN  Outcome: Ongoing  3/6/2021 1901 by Nabil Steel RN  Outcome: Ongoing  Goal: Mental status will be restored to baseline  Description: Mental status will be restored to baseline  3/7/2021 0405 by Jordis Ahumada, RN  Outcome: Ongoing  3/6/2021 1901 by Nabil Steel RN  Outcome: Ongoing     Problem: Discharge Planning:  Goal: Ability to perform activities of daily living will improve  Description: Ability to perform activities of daily living will improve  3/7/2021 0405 by Jordis Ahumada, RN  Outcome: Ongoing  3/6/2021 1901 by Nabil Steel RN  Outcome: Ongoing  Goal: Participates in care planning  Description: Participates in care planning  3/7/2021 0405 by Jordis Ahumada, RN  Outcome: Ongoing  3/6/2021 1901 by Nabil Steel RN  Outcome: Ongoing     Problem: Injury - Risk of, Physical Injury:  Goal: Will remain free from falls  Description: Will remain free from falls  3/7/2021 0405 by Jordis Ahumada, RN  Outcome: Ongoing  3/6/2021 1901 by Nabil Steel RN  Outcome: Met This Shift  Goal: Absence of physical injury  Description: Absence of physical injury  3/7/2021 0405 by Jordis Ahumada, RN  Outcome: Ongoing  3/6/2021 1901 by Nabil Steel RN  Outcome: Met This Shift     Problem: Mood - Altered:  Goal: Mood stable  Description: Mood stable  3/7/2021 0405 by Jordis Ahumada, RN  Outcome: Ongoing  3/6/2021 1901 by Nabil Steel RN  Outcome: Ongoing  Goal: Absence of abusive behavior  Description: Absence of abusive behavior  3/7/2021 0405 by Jordis Ahumada, RN  Outcome: Ongoing  3/6/2021 1901 by Nabil Steel RN  Outcome: Met This Shift  Goal: Verbalizations of feeling emotionally comfortable while being cared for will increase  Description: Verbalizations of feeling emotionally comfortable while being cared for will increase  3/7/2021 0405 by Joseph Doshi RN  Outcome: Ongoing  3/6/2021 1901 by Charlie Ruggiero RN  Outcome: Ongoing     Problem: Psychomotor Activity - Altered:  Goal: Absence of psychomotor disturbance signs and symptoms  Description: Absence of psychomotor disturbance signs and symptoms  3/7/2021 0405 by Joseph Doshi RN  Outcome: Ongoing  3/6/2021 1901 by Charlie Ruggiero RN  Outcome: Ongoing     Problem: Sensory Perception - Impaired:  Goal: Demonstrations of improved sensory functioning will increase  Description: Demonstrations of improved sensory functioning will increase  3/7/2021 0405 by Joseph Doshi RN  Outcome: Ongoing  3/6/2021 1901 by Charlie Ruggiero RN  Outcome: Met This Shift  Goal: Decrease in sensory misperception frequency  Description: Decrease in sensory misperception frequency  3/7/2021 0405 by Joseph Doshi RN  Outcome: Ongoing  3/6/2021 1901 by Charlie Ruggiero RN  Outcome: Met This Shift  Goal: Able to refrain from responding to false sensory perceptions  Description: Able to refrain from responding to false sensory perceptions  3/7/2021 0405 by Joseph Doshi RN  Outcome: Ongoing  3/6/2021 1901 by Charlie Ruggiero RN  Outcome: Ongoing  Goal: Demonstrates accurate environmental perceptions  Description: Demonstrates accurate environmental perceptions  3/7/2021 0405 by Joseph Doshi RN  Outcome: Ongoing  3/6/2021 1901 by Charlie Ruggiero RN  Outcome: Ongoing  Goal: Able to distinguish between reality-based and nonreality-based thinking  Description: Able to distinguish between reality-based and nonreality-based thinking  3/7/2021 0405 by Joseph Doshi RN  Outcome: Ongoing  3/6/2021 1901 by Charlie Ruggiero RN  Outcome: Ongoing  Goal: Able to interrupt nonreality-based thinking  Description: Able to interrupt nonreality-based thinking  3/7/2021 0405 by Noemí Lee RN  Outcome: Ongoing  3/6/2021 1901 by Di Painting RN  Outcome: Ongoing     Problem: Sleep Pattern Disturbance:  Goal: Appears well-rested  Description: Appears well-rested  3/7/2021 0405 by Noemí Lee RN  Outcome: Ongoing  3/6/2021 1901 by Di Painting RN  Outcome: Ongoing

## 2021-03-07 NOTE — PLAN OF CARE
Problem: Skin Integrity:  Goal: Will show no infection signs and symptoms  Description: Will show no infection signs and symptoms  3/7/2021 1756 by Kalpesh Palmer RN  Outcome: Ongoing  3/7/2021 0405 by Omar Villasenor RN  Outcome: Ongoing     Problem: Skin Integrity:  Goal: Absence of new skin breakdown  Description: Absence of new skin breakdown  3/7/2021 1756 by Kalpesh Palmer RN  Outcome: Ongoing  3/7/2021 0405 by Omar Villasenor RN  Outcome: Ongoing     Problem: Falls - Risk of:  Goal: Will remain free from falls  Description: Will remain free from falls  3/7/2021 1756 by Kalpesh Palmer RN  Outcome: Ongoing  3/7/2021 0405 by Omar Villasenor RN  Outcome: Ongoing     Problem: Falls - Risk of:  Goal: Absence of physical injury  Description: Absence of physical injury  3/7/2021 1756 by Kalpesh Palmer RN  Outcome: Ongoing  3/7/2021 0405 by Omar Villasenor RN  Outcome: Ongoing   Reposition patient every 2 hours. Maintain clean and dry skin. Round on patient hourly to prevent falls. Continue to monitor patient closely.   Kalpesh Palmer RN

## 2021-03-07 NOTE — FLOWSHEET NOTE
Dangle at bedside. Stand/sit exercises X3. Help balance/used nurse to pull up to stand. Patient tolerated well.   Donnie Leach RN

## 2021-03-07 NOTE — FLOWSHEET NOTE
SPIRITUAL CARE DEPARTMENT - [address]  PROGRESS NOTE    Shift date: 03/06/2021    Shift day: Saturday   Shift # 2    Room # 2281/7770-59   Name: Jeffery Molina            Age: 36 y.o. Gender: male          Christianity:    Place of [facility]iness:     Referral: Patient extubated;  Reportedly Patient and signifcant other asked for POA docs. Admit Date & Time: 2/28/2021  7:38 AM    PATIENT/EVENT DESCRIPTION:  Jeffery Molina is a 36 y.o. male   (Patient extubated in move toward recovery. Nurse reported they had asked for POA work to be done. Later staff determined not to proceed due to medications being administered. SPIRITUAL ASSESSMENT/INTERVENTION:  Visited with Domestic Partner of 14 years. Ministered with her in prayer and in reflective listening. Advocated with nurse to be watching if patient would have a period when he was alert and oriented in which POA work could be done. Visited with domestic partner; she broached the topic f being  in [facility] spoke about the very limited occurence of this in his experience at [facility]. SPIRITUAL CARE FOLLOW-UP PLAN:  Chaplains will remain available to offer spiritual and emotional support as needed. Chaplains might do well to respond promptly if nurse/s identify good time for POA work. Electronically signed by Nohemi Ayon on 3/6/2021 at 10:24 PM.  [address] [employer]  [phone]               03/06/21 2130   Encounter Summary   Services provided to: Family; Patient not available   Referral/Consult From: Nurse   Support System Significant other;Children;Family members   Continue Visiting   (03/06/2021   SO )   Complexity of Encounter Moderate   Length of Encounter 30 minutes   Spiritual Assessment Completed Yes   Routine   Type Initial   Assessment Tearful;Grieving; Anxious; Hopeful;Coping; Sad   Intervention Active listening;Explored feelings, thoughts, concerns;Explored coping resources;Nurtured hope;Prayer;Sustaining presence/ Ministry of presence   Outcome Acceptance;Comfort;Expressed gratitude;Engaged in conversation;Expressed feelings/needs/concerns; Less anxious, less agitated;Encouraged

## 2021-03-07 NOTE — FLOWSHEET NOTE
03/07/21 0900   Provider Notification   Reason for Communication Review case  (staples pulled from incision)   Provider Name Dr. Alfonso Stoner   Provider Notification Physician   Method of Communication Face to face   Response At bedside   Notification Time [id number] back to OR due to incision drainage  Donnei Leach, RN

## 2021-03-07 NOTE — ACP (ADVANCE CARE PLANNING)
Advance Care Planning     General Advance Care Planning (ACP) Conversation    Date of Conversation: 2/28/2021  Conducted with:  Patient not assessed in person for being alert and oriented    Healthcare Decision Maker:      Primary Decision Maker (Active): Naomi Smyth - [phone]    Supplemental (Other) Decision Maker: Óscarnettie Baker - Other - [phone]    Click here to [address] including selection of the Healthcare Decision Maker Relationship (ie \"Primary\")      Content/Action Overview:  Nurse Perf Served to ask if  could facilitate POA  paperwork as per patient request; later asjed to delay until morning due to nature of medications being given.       Length of Voluntary ACP Conversation in minutes: less than 5 minutes     Juliann Bernard

## 2021-03-07 NOTE — FLOWSHEET NOTE
Advance Care Planning     Advance Care Planning Activator (Inpatient)  Conversation Note      Date of ACP Conversation: 2/28/2021    Conversation Conducted with: Patient with Decision Making Capacity    ACP Activator: Zhang [address] Decision Maker:     Current Designated Health Care Decision Maker:     Primary Decision Maker: Swati Pinzon - [phone]    Today we documented Decision Maker(s) consistent with ACP documents on file. Care Preferences    Patient was heading to the OR for surgery we did not have time to discusses care preferences he did state that he trust his healthcare POA to withdraw care if his condition leaves him with no quality of life. Length of ACP Conversation in minutes:  30 min     Conversation Outcomes:  [x] ACP discussion completed  [] Existing advance directive reviewed with patient; no changes to patient's previously recorded wishes  [] New Advance Directive completed  [] Portable Do Not Rescitate prepared for Provider review and signature  [] POLST/POST/MOLST/MOST prepared for Provider review and signature         03/07/21 1008   Encounter Summary   Services provided to: Patient and family together   Referral/Consult From: Nurse   Support System Significant other   Continue Visiting   (03/07/21)   Complexity of Encounter Moderate   Length of Encounter 45 minutes   Spiritual Assessment Completed Yes   Advance Care Planning Yes   Advance Directives (For Healthcare)   Healthcare Directive Yes, patient has an advance directive for healthcare treatment   Type of Healthcare Directive Durable power of  for health care   Copy in Chart Yes, copy in [address] [place] [address] Avenue Agent's Name [address]. Agent's Phone Number [phone]   If you are unable to speak for yourself, does your Healthcare Agent or Legal Spokesperson know your healthcare wishes?  Yes

## 2021-03-07 NOTE — FLOWSHEET NOTE
03/07/21 0745   Provider Notification   Reason for Communication Evaluate; Review case  (abd drainage, meds, culture)   Provider Name Dr. Dorothea Aragon   Provider Notification Resident   Method of Communication Face to face   Response At bedside   Notification Time Ranjith Lopez RN

## 2021-03-08 NOTE — PLAN OF CARE
Problem: Skin Integrity:  Goal: Will show no infection signs and symptoms  Description: Will show no infection signs and symptoms  3/8/2021 0607 by Brandon Snowden RN  Outcome: Ongoing  3/7/2021 1756 by Keith Salvador RN  Outcome: Ongoing  Goal: Absence of new skin breakdown  Description: Absence of new skin breakdown  3/8/2021 0607 by Brandon Snowden RN  Outcome: Ongoing  3/7/2021 1756 by Keith Salvador RN  Outcome: Ongoing     Problem: Falls - Risk of:  Goal: Will remain free from falls  Description: Will remain free from falls  3/8/2021 0607 by Brandon Snowden RN  Outcome: Ongoing  3/7/2021 1756 by Keith Salvador RN  Outcome: Ongoing  Goal: Absence of physical injury  Description: Absence of physical injury  3/8/2021 0607 by Brandon Snowden RN  Outcome: Ongoing  3/7/2021 1756 by Keith Salvador RN  Outcome: Ongoing     Problem: OXYGENATION/RESPIRATORY FUNCTION  Goal: Patient will maintain patent airway  Outcome: Ongoing  Goal: Patient will achieve/maintain normal respiratory rate/effort  Description: Respiratory rate and effort will be within normal limits for the patient  Outcome: Ongoing     Problem: MECHANICAL VENTILATION  Goal: Patient will maintain patent airway  Outcome: Ongoing  Goal: Oral health is maintained or improved  Outcome: Ongoing  Goal: Tracheostomy will be managed safely  Outcome: Ongoing  Goal: Ability to express needs and understand communication  Outcome: Ongoing  Goal: Mobility/activity is maintained at optimum level for patient  Outcome: Ongoing     Problem: Confusion - Acute:  Goal: Absence of continued neurological deterioration signs and symptoms  Description: Absence of continued neurological deterioration signs and symptoms  Outcome: Ongoing  Goal: Mental status will be restored to baseline  Description: Mental status will be restored to baseline  Outcome: Ongoing     Problem: Discharge Planning:  Goal: Ability to perform activities of daily living will improve Description: Ability to perform activities of daily living will improve  Outcome: Ongoing  Goal: Participates in care planning  Description: Participates in care planning  Outcome: Ongoing     Problem: Injury - Risk of, Physical Injury:  Goal: Will remain free from falls  Description: Will remain free from falls  3/8/2021 0607 by Heidi Verde RN  Outcome: Ongoing  3/7/2021 1756 by Catracho Meneses RN  Outcome: Ongoing  Goal: Absence of physical injury  Description: Absence of physical injury  3/8/2021 0607 by Heidi Verde RN  Outcome: Ongoing  3/7/2021 1756 by Catracho Meneses RN  Outcome: Ongoing     Problem: Mood - Altered:  Goal: Mood stable  Description: Mood stable  Outcome: Ongoing  Goal: Absence of abusive behavior  Description: Absence of abusive behavior  Outcome: Ongoing  Goal: Verbalizations of feeling emotionally comfortable while being cared for will increase  Description: Verbalizations of feeling emotionally comfortable while being cared for will increase  Outcome: Ongoing     Problem: Psychomotor Activity - Altered:  Goal: Absence of psychomotor disturbance signs and symptoms  Description: Absence of psychomotor disturbance signs and symptoms  Outcome: Ongoing     Problem: Sensory Perception - Impaired:  Goal: Demonstrations of improved sensory functioning will increase  Description: Demonstrations of improved sensory functioning will increase  Outcome: Ongoing  Goal: Decrease in sensory misperception frequency  Description: Decrease in sensory misperception frequency  Outcome: Ongoing  Goal: Able to refrain from responding to false sensory perceptions  Description: Able to refrain from responding to false sensory perceptions  Outcome: Ongoing  Goal: Demonstrates accurate environmental perceptions  Description: Demonstrates accurate environmental perceptions  Outcome: Ongoing  Goal: Able to distinguish between reality-based and nonreality-based thinking  Description: Able to distinguish between reality-based and nonreality-based thinking  Outcome: Ongoing  Goal: Able to interrupt nonreality-based thinking  Description: Able to interrupt nonreality-based thinking  Outcome: Ongoing     Problem: Sleep Pattern Disturbance:  Goal: Appears well-rested  Description: Appears well-rested  Outcome: Ongoing

## 2021-03-08 NOTE — FLOWSHEET NOTE
SPIRITUAL CARE PROGRESS NOTE    Spiritual Assessment:   encountered patient's spouse while rounding in the unit. Patient's spouse was approachable, and shared feelings of concern related to patient's health condition and hospitalization. Patient's spouse shared she was coping and did have emotional support through children and other family members. Intervention:  engaged the patient's spouse through active listening and being a spiritual presence. Outcome:Chaplains are available for specific request visits at any time and may be paged via Just[facility] Nava. 03/07/21 2104   Encounter Summary   Services provided to: Family; Patient   Referral/Consult From: [facility] Significant other   Continue Visiting   (3/7/2021)   Complexity of Encounter Moderate   Length of Encounter 15 minutes   Routine   Type Follow up   Assessment Approachable;Coping   Intervention Explored coping resources; Explored feelings, thoughts, concerns   Outcome Expressed gratitude     Electronically signed by Naila Cueto on 3/7/2021 at 9:09 PM.  [address] [employer]  [phone]

## 2021-03-08 NOTE — PROGRESS NOTES
Comprehensive Nutrition Assessment    Type and Reason for Visit:  Reassess    Nutrition Recommendations/Plan: Continue TF as tolerated; Start oral diet as appropriate. Will continue to monitor TF tolerance/adequacy and care plans. Nutrition Assessment:  S/p re-opening laparotomy w/ washout and small sigmoid resection on 3/7/21. Pt is now extubated. Pt remains NPO at this time. Noted TF was held last night d/t emesis; TF restarted and is now at goal of 50 mL/hr. No ostomy output yet per RN. Meds/labs reviewed. Malnutrition Assessment:  Malnutrition Status:  Insufficient data      Estimated Daily Nutrient Needs:  Energy (kcal):  4314-8419 kcal/day; Weight Used for Energy Requirements:  Admission     Protein (g):  110 g pro/day; Weight Used for Protein Requirements:  Current(1.5)            Nutrition Related Findings:  Colostomy      Wounds:  Surgical Incision-abdomen      Current Nutrition Therapies:    Dietary Nutrition Supplements: Wound Healing Oral Supplement  Diet Tube Feed Continuous/Cyclic w/ Diet  Current Tube Feeding (TF) Orders:  · Formula: Immune Enhancing  · Schedule: Continuous at 50 mL/hr   · Current TF & Flush Orders Provides: 1800 kcal and 113 g pro/day    Anthropometric Measures:  · Height: 5' 9\" (175.3 cm)  · Current Body Weight: 182 lb 5.1 oz (82.7 kg)   · Admission Body Weight: 160 lb (72.6 kg)(stated)    · Ideal Body Weight: 160 lbs; % Ideal Body Weight  114%   · BMI: 26.9  · BMI Categories: Overweight (BMI 25.0-29. 9)       Nutrition Diagnosis:   · Inadequate oral intake related to current medical condition; recent extubation as evidenced by NPO or clear liquid status due to medical condition,need for enteral nutrition support    Nutrition Interventions:   Food and/or Nutrient Delivery: Continue TF as tolerated; Start oral diet as appropriate.   Nutrition Education/Counseling:  No recommendation at this time   Coordination of Nutrition Care:  Continue to monitor while inpatient    Goals: meet % of estimated nutrient needs -goal achieved      Nutrition Monitoring and Evaluation:   Food/Nutrient Intake Outcomes:  Enteral Nutrition Intake/Tolerance, Diet Advancement/Tolerance  Physical Signs/Symptoms Outcomes:  Biochemical Data, Nutrition Focused Physical Findings, Skin, Weight, GI Status, Nausea or Vomiting     Discharge Planning:     Too soon to determine     Electronically signed by Saintclair Sea, RD, LD on 3/8/21 at 12:28 PM EST    Contact: [phone]

## 2021-03-08 NOTE — PROGRESS NOTES
ICU PROGRESS NOTE        PATIENT NAME: Olegario Clarke [place] RECORD NO. [id number]  DATE: 3/8/2021    PRIMARY CARE PHYSICIAN: Sonam Carnes DO    HD: # 8    ASSESSMENT    Patient Active Problem List   Diagnosis    History of colon polyps    Perforated bowel ([facility] [place] [address]    Perforated rectum [facility])       MEDICAL DECISION MAKING AND PLAN  1. Neuro:  1. Hx of cocaine abuse, concern for drug withdrawal   2. Agitation/delirium   1. Valium started for withdrawal and have been weaning; DC today  2. Haldol PRN for severe agitation  3. Pain management   1. Ketamine gtt, precedex gtt, MMPT (decrease uriel to 5q8)  4. Continue with thiamine and folate supplementation   2. CV  1. Mild persistent tachycardia (100-115); episode of tachy in 120-130s overnight was associated with agitation/pain  2. MAP   3. Lactic acid 1.6  4. Hx of cocaine abuse  1. No acute changes on last EKG  2. Normal trops throughout admission     3. Pulm  1. Extubated, saturations 90-93% on 2L NC   2. CXR: slight worsening of right pleural effusion, with some mild to mod pulm vasc congestion   3. encourage IS and up out of bed  4. IS today < 500cc     4. GI/Nutrition  1. Ex lap ovalles's procedure, creation of end colostomy (2/28)   2. Re-opening laparotomy w/ washout and small sigmoid resection (3/7); fascia closed wound vac placed with white and black foam  3. Mild Ileus 3/7-8;  minimal ostomy output overnight; no nausea/vom; will continue to monitor  4. Continue TF at goal; DC pepcid  5. Continue with pepcid 20mg     6. Tube feeds held last night due to distension and emesis, will restart at goal 50cc/hr     5. Renal/lytes  1. BMP: 143/3.8/109/25/20/0.75  2. Potassium 3.8 - repletion ordered   3. Phos 2.3; will replace   4. Mg - 2.1  5. DC IVF while attempting to diurese; monitory for worsening tachy  6. Urine output 1 cc/kg/hr  7. Total since admission (+[id number])  1.  Lasix 20 IV lasix this AM; consider additional 40mg in afternoon pending hemodynamic stability and effectiveness of morning diuresis  6. Heme  1. DVT prophylaxis- levonox 40mg daily  2. Hgb 10.5 (9.1)     7.   Endocrine        9. Glucose 117; well controlled     7. Musculoskeletal  1. PT/OT; encourage up out of bed and ambulating todya     8. Skin  1. No abrasions or lacerations   2. Abdominal incision - some surrounding erythema, no fluctuance noted, some serous drainage coming from incision site      9. Micro  1. WBC 16.6 (13.5)   2. Afebrile  3. Continue zosyn for 4 days from last OR      10. Family/dispo  1. Will continue to wean off ventilatory and sedation as patient tolerates, family updated   2. Keep in ICU, diurese, wean precedex if tolerating, keep ketamine; consider DC ketamine tomorrow and transfer out of unit     11. Lines  1. NG  2. Negrete    3. CHANDNI  4. Wound vac  5. Arterial line and CVC DCed on 3/7    CHECKLIST    CAM-ICU RASS: negative  RESTRAINTS: not indicated  IVF: DC today  NUTRITION: TF at goal  ANTIBIOTICS: zosyn  GI: DC pepcid; tolerating TF @ goal  DVT: lovenox  GLYCEMIC CONTROL: well controlled  HOB >45: ok  MOBILITY: encourage up out of bed    SUBJECTIVE    Miranda Palacios one episode of agitation and tachycardia overnight that resolved after fentanyl given for pain control. Remained afebrile. Tolerating TF at goal. Minimal output from ostomy. 100cc serosanguinous output from CHANDNI. No output from wound vac      OBJECTIVE  VITALS: Temp: Temp: 98 °F (36.7 °C)Temp  Av.4 °F (36.9 °C)  Min: 97.3 °F (36.3 °C)  Max: 99.9 °F (88.6 °C) BP Systolic (63NVH), QUR:000 , Min:81 , RXR:890   Diastolic (46OJK), QYZ:37, Min:59, Max:116   Pulse Pulse  Av.9  Min: 83  Max: 133 Resp Resp  Av.7  Min: 0  Max: 36 Pulse ox SpO2  Av.7 %  Min: 86 %  Max: 100 %    CONSTITUTIONAL: alert, no acute distress  HEENT: head atraumatic and normocephalic. PERRL. Conjunctiva normal  LUNGS: equal chest rise bilaterally  CV: tachycardic. Regular rhythm  GI: NG in place.  dline incision with wound vac in place, mild diffuse post op tenderness, no distention. CHANDNI drain in place.    MUSCULOSKELETAL: moving all extremities  NEUROLOGIC: alert, oriented, following commands  SKIN: warm, dry    Drain/tube output:    Negrete/urine 1cc/kg/h  Chandni 100cc overnight (serosaguious)   Wound vac: 0 out overnight      LAB:  CBC:   Recent Labs     03/06/21  0455 03/07/21  0554 03/08/21  0558   WBC 12.7* 13.5* 16.6*   HGB 7.6* 9.1* 10.5*   HCT 23.9* 29.6* 33.2*   MCV 94.5 97.0 95.1    418 625*     BMP:   Recent Labs     03/06/21  0455 03/07/21  0554 03/08/21  0558   * 141 143   K 3.6* 3.8 3.8   * 105 109*   CO2 23 26 25   BUN 19 19 20   CREATININE 0.66* 0.66* 0.75   GLUCOSE 110* 104* 117*       Ok Mclaughlin DO  3/8/21, 7:03 AM

## 2021-03-08 NOTE — PLAN OF CARE
Problem: Skin Integrity:  Goal: Will show no infection signs and symptoms  Description: Will show no infection signs and symptoms  3/8/2021 1114 by Michael Arriola RN  Outcome: Ongoing  3/8/2021 1049 by Michael Arriola RN  Outcome: Ongoing  3/8/2021 0607 by Chantale Cannon RN  Outcome: Ongoing  Goal: Absence of new skin breakdown  Description: Absence of new skin breakdown  3/8/2021 1114 by Michael Arriola RN  Outcome: Ongoing  3/8/2021 0607 by Chantale Cannon RN  Outcome: Ongoing     Problem: Falls - Risk of:  Goal: Will remain free from falls  Description: Will remain free from falls  3/8/2021 1114 by Michael Arriola RN  Outcome: Ongoing  3/8/2021 0607 by Chantale Cannon RN  Outcome: Ongoing  Goal: Absence of physical injury  Description: Absence of physical injury  3/8/2021 1114 by Michael Arriola RN  Outcome: Ongoing  3/8/2021 0607 by Chantale Cannon RN  Outcome: Ongoing     Problem: OXYGENATION/RESPIRATORY FUNCTION  Goal: Patient will maintain patent airway  3/8/2021 1114 by Michael Arriola RN  Outcome: Ongoing  3/8/2021 0607 by hCantale Cannon RN  Outcome: Ongoing  Goal: Patient will achieve/maintain normal respiratory rate/effort  Description: Respiratory rate and effort will be within normal limits for the patient  3/8/2021 1114 by Michael Arriola RN  Outcome: Ongoing  3/8/2021 0607 by Chantale Cannon RN  Outcome: Ongoing     Problem: MECHANICAL VENTILATION  Goal: Patient will maintain patent airway  3/8/2021 1114 by Michael Arriola RN  Outcome: Ongoing  3/8/2021 0607 by Chantale Cannon RN  Outcome: Ongoing  Goal: Oral health is maintained or improved  3/8/2021 1114 by Michael Arriola RN  Outcome: Ongoing  3/8/2021 0607 by Chantale Cannon RN  Outcome: Ongoing  Goal: Ability to express needs and understand communication  3/8/2021 1114 by Michael Arriloa RN  Outcome: Ongoing  3/8/2021 0607 by Chantale Cannon RN  Outcome: Ongoing  Goal: Mobility/activity is maintained at optimum level for patient  3/8/2021 1114 by Michael Arriola RN Outcome: Ongoing  3/8/2021 0607 by Heidi Verde RN  Outcome: Ongoing     Problem: Confusion - Acute:  Goal: Absence of continued neurological deterioration signs and symptoms  Description: Absence of continued neurological deterioration signs and symptoms  3/8/2021 1114 by Hero Yañez RN  Outcome: Ongoing  3/8/2021 0607 by Heidi Verde RN  Outcome: Ongoing  Goal: Mental status will be restored to baseline  Description: Mental status will be restored to baseline  3/8/2021 1114 by Hero Yañez RN  Outcome: Ongoing  3/8/2021 0607 by Heidi Verde RN  Outcome: Ongoing     Problem: Discharge Planning:  Goal: Ability to perform activities of daily living will improve  Description: Ability to perform activities of daily living will improve  3/8/2021 1114 by Hero Yañez RN  Outcome: Ongoing  3/8/2021 0607 by Heidi Verde RN  Outcome: Ongoing  Goal: Participates in care planning  Description: Participates in care planning  3/8/2021 1114 by Hero Yañez RN  Outcome: Ongoing  3/8/2021 0607 by Heidi Verde RN  Outcome: Ongoing     Problem: Injury - Risk of, Physical Injury:  Goal: Will remain free from falls  Description: Will remain free from falls  3/8/2021 1114 by Hero Yañez RN  Outcome: Ongoing  3/8/2021 0607 by Heidi Verde RN  Outcome: Ongoing  Goal: Absence of physical injury  Description: Absence of physical injury  3/8/2021 1114 by Hero Yañez RN  Outcome: Ongoing  3/8/2021 0607 by Heidi Verde RN  Outcome: Ongoing     Problem: Mood - Altered:  Goal: Mood stable  Description: Mood stable  3/8/2021 1114 by Hero Yañez RN  Outcome: Ongoing  3/8/2021 0607 by Heidi Verde RN  Outcome: Ongoing  Goal: Absence of abusive behavior  Description: Absence of abusive behavior  3/8/2021 1114 by Hero Yañez RN  Outcome: Ongoing  3/8/2021 0607 by Heidi Verde RN  Outcome: Ongoing  Goal: Verbalizations of feeling emotionally comfortable while being cared for will increase  Description: Verbalizations of feeling emotionally comfortable while being cared for will increase  3/8/2021 1114 by Hector Edmondson RN  Outcome: Ongoing  3/8/2021 0607 by Kuldeep Alejandra RN  Outcome: Ongoing     Problem: Psychomotor Activity - Altered:  Goal: Absence of psychomotor disturbance signs and symptoms  Description: Absence of psychomotor disturbance signs and symptoms  3/8/2021 1114 by Hector Edmondson RN  Outcome: Ongoing  3/8/2021 0607 by Kuldeep Alejandra RN  Outcome: Ongoing     Problem: Sensory Perception - Impaired:  Goal: Demonstrations of improved sensory functioning will increase  Description: Demonstrations of improved sensory functioning will increase  3/8/2021 1114 by Hector Edmondson RN  Outcome: Ongoing  3/8/2021 0607 by Kuldeep Alejandra RN  Outcome: Ongoing  Goal: Decrease in sensory misperception frequency  Description: Decrease in sensory misperception frequency  3/8/2021 1114 by Hector Edmondson RN  Outcome: Ongoing  3/8/2021 0607 by Kuldeep Alejandra RN  Outcome: Ongoing  Goal: Able to refrain from responding to false sensory perceptions  Description: Able to refrain from responding to false sensory perceptions  3/8/2021 1114 by Hector Edmondson RN  Outcome: Ongoing  3/8/2021 0607 by Kuldeep Alejandra RN  Outcome: Ongoing  Goal: Demonstrates accurate environmental perceptions  Description: Demonstrates accurate environmental perceptions  3/8/2021 1114 by Hector Edmondson RN  Outcome: Ongoing  3/8/2021 0607 by Kuldeep Alejandra RN  Outcome: Ongoing  Goal: Able to distinguish between reality-based and nonreality-based thinking  Description: Able to distinguish between reality-based and nonreality-based thinking  3/8/2021 1114 by Hector Edmondson RN  Outcome: Ongoing  3/8/2021 0607 by Kuldeep Alejandra RN  Outcome: Ongoing  Goal: Able to interrupt nonreality-based thinking  Description: Able to interrupt nonreality-based thinking  3/8/2021 1114 by Hector Edmondson RN  Outcome: Ongoing  3/8/2021 0607 by Kuldeep Alejandra RN  Outcome: Ongoing     Problem: Sleep Pattern Disturbance:  Goal: Appears well-rested  Description: Appears well-rested  3/8/2021 1114 by Caitlyn Hernandez RN  Outcome: Ongoing  3/8/2021 0607 by Indra Bragg RN  Outcome: Ongoing

## 2021-03-09 NOTE — ANESTHESIA POSTPROCEDURE EVALUATION
Department of Anesthesiology  Postprocedure Note    Patient: Brian Morgan  MRN: [id number]  [place]: 1980  Date of evaluation: 3/9/2021  Time:  10:55 AM     Procedure Summary     Date: 03/07/21 Room / Location: [address]    Anesthesia Start: 4052 Anesthesia Stop: 6880    Procedure: RE-OPENING OF PREVIOUS LAPAROTOMY, SIGMOID RESECTION, ABDOMINAL LAVAGE, NEGATIVE PRESSURE WOUND THERAPY (N/A ) Diagnosis: (INTRAABDOMINAL SEPSIS)    Surgeons: Stefanie Lee MD Responsible Provider: Wardell Lesch, MD    Anesthesia Type: general ASA Status: 3 - Emergent          Anesthesia Type: general    Martine Phase I: Martine Score: 8    Martine Phase II:      Last vitals: Reviewed and per EMR flowsheets.        Anesthesia Post Evaluation    Patient location during evaluation: PACU  Patient participation: complete - patient participated  Level of consciousness: awake and alert  Pain score: 1  Airway patency: patent  Nausea & Vomiting: no nausea and no vomiting  Complications: no  Cardiovascular status: blood pressure returned to baseline  Respiratory status: acceptable  Hydration status: euvolemic

## 2021-03-09 NOTE — PLAN OF CARE
Pt assessed as a fall risk this shift. Remains free from falls and accidental injury at this time. Fall precautions in place, including falling star sign. Floor free from obstacles, and bed is locked and in lowest position. Adequate lighting provided. Pt encouraged to call before getting Out Of Bed for any need. Will continue to monitor needs during hourly rounding, and reinforce education on use of call light.  Arthur Lights

## 2021-03-09 NOTE — PROGRESS NOTES
Physical Therapy    Facility/Department: [address]  Initial Assessment    NAME: Suzan Duenas  : 1980  MRN: [id number]    Date of Service: 3/9/2021  History copied and pasted from H+P:  The patient is a 36 y.o. male  who presents with 3d h/o progressively worsening abdominal pain. Pain acutely started 3 days ago. Has been constipated w/ last bm 3 days ago, nauseous w/out emesis. Due to worsening pain pt came to ED for evaluation. No fever, chills. Denies sob, cp. No recent sick contacts. Denies hematochezia/melena.      Has h/o chronic abdominal pain. Had colonoscopy  w/ removal of polyp (benign) and diverticulosis. Thinks mother had IBD, she is . Pt underwent EXPLORATORY LAPAROTOMY, MOBILIZATION OF FALSIFORM LIGAMENT, CATTELL-BRESCH MANEUVER, DINA MANEUVER, CONTROL OF PELVIC HEMORRHAGE, GREENE'S PROCEDURE, KOCHERIZATION OF DUODENUM, CREATION OF END COLOSTOMY for perforated bowel (3/4/21)  Discharge Recommendations:  No further therapy required at discharge. PT Equipment Recommendations  Equipment Needed: No    Assessment    Pt cooperative, eager to get OOB and up to chair; tachycardic (low to mid 120's), denied light headed or dizziness. Body structures, Functions, Activity limitations: Decreased functional mobility ; Decreased safe awareness;Decreased endurance;Decreased balance; Increased pain;Decreased posture  Prognosis: Good  Decision Making: Medium Complexity  PT Education: Goals;PT Role;Plan of Care  REQUIRES PT FOLLOW UP: Yes  Activity Tolerance  Activity Tolerance: Patient limited by pain; Patient limited by endurance       Patient Diagnosis(es): The encounter diagnosis was Acute abdomen. has a past medical history of History of colon polyps and IBS (irritable bowel syndrome). has a past surgical history that includes Colonoscopy (N/A, 10/25/2019); laparotomy (N/A, 2021); Abdomen surgery (2021); Abdomen surgery (2021); and laparotomy (N/A, 3/7/2021). Sensation  Overall Sensation Status: WFL  Bed mobility  Rolling to Left: Minimal assistance  Supine to Sit: Minimal assistance  Scooting: Stand by assistance  Comment: pt needed encouragement to try to move for himself prior to asking for assistance  Transfers  Sit to Stand: Contact guard assistance  Stand to sit: Contact guard assistance  Bed to Chair: Contact guard assistance  Stand Pivot Transfers: Contact guard assistance  Ambulation  Ambulation?: Yes  Ambulation 1  Surface: level tile  Device: No Device  Assistance: Contact guard assistance  Quality of Gait: flexed  posture  Gait Deviations: Slow Hyacinth; Increased SHARON  Distance: ~4 steps bed to chair (limited distance d/t tachycardia)  Stairs/Curb  Stairs?: No     Balance  Posture: Fair  Sitting - Static: Good  Sitting - Dynamic: Good  Standing - Static: Good  Standing - Dynamic: Fair;+  Other exercises  Other exercises 1: AROM x 4  Other exercises 2: trunk extension     Plan   Plan  Times per week: 5-6 visits weekly  Times per day: Daily  Current Treatment Recommendations: Strengthening, ROM, Balance Training, Functional Mobility Training, Transfer Training, Endurance Training, Gait Training, Stair training, Pain Management, Home Exercise Program, Safety Education & Training, Patient/Caregiver Education & Training  Safety Devices  Type of devices: Call light within reach, Chair alarm in place, Gait belt, Patient at risk for falls, Left in chair, Nurse notified(pt left on chair alarm, but no box to connect it to; RN notified)  Restraints  Initially in place: No    AM-PAC Score  AM-PAC Inpatient Mobility Raw Score : 18 (03/09/21 1101)  AM-PAC Inpatient T-Scale Score : 43.63 (03/09/21 1101)  Mobility Inpatient CMS 0-100% Score: 46.58 (03/09/21 1101)  Mobility Inpatient CMS G-Code Modifier : CK (03/09/21 1101)          Goals  Short term goals  Time Frame for Short term goals: 12 visits  Short term goal 1: independent bed mobility without use of bed rails  Short

## 2021-03-09 NOTE — PLAN OF CARE
Problem: Skin Integrity:  Goal: Will show no infection signs and symptoms  Description: Will show no infection signs and symptoms  Outcome: Ongoing     Problem: Skin Integrity:  Goal: Absence of new skin breakdown  Description: Absence of new skin breakdown  Outcome: Ongoing     Problem: Falls - Risk of:  Goal: Will remain free from falls  Description: Will remain free from falls  3/9/2021 1352 by Tremayne Quick RN  Outcome: Ongoing     Problem: Falls - Risk of:  Goal: Absence of physical injury  Description: Absence of physical injury  3/9/2021 1352 by Tremayne Quick RN  Outcome: Ongoing     Problem: OXYGENATION/RESPIRATORY FUNCTION  Goal: Patient will maintain patent airway  Outcome: Ongoing     Problem: Injury - Risk of, Physical Injury:  Goal: Will remain free from falls  Description: Will remain free from falls  3/9/2021 1352 by Tremayne Quick RN  Outcome: Ongoing     Problem: Injury - Risk of, Physical Injury:  Goal: Absence of physical injury  Description: Absence of physical injury  3/9/2021 1352 by Tremayne Quick RN  Outcome: Ongoing

## 2021-03-09 NOTE — PROGRESS NOTES
ICU PROGRESS NOTE        PATIENT NAME: Oni Lobo RECORD NO. [id number]  DATE: 3/9/2021    PRIMARY CARE PHYSICIAN: Sanchez Espinosa, DO    HD: # 9    ASSESSMENT    Patient Active Problem List   Diagnosis    History of colon polyps    Perforated bowel ([facility] [address]    Perforated rectum [facility])       MEDICAL DECISION MAKING AND PLAN  1. Neuro:  1. Hx of cocaine abuse, concern for drug withdrawal   2. Agitation/delirium   1. Valium DCed  2. Haldol PRN for severe agitation   3. Pain management   1. DC Ketamine gtt, precedex gtt,  2. MMPT: lilly, robaxin, tylenol, motrin. (continue uriel to 5q8)  4. Continue with thiamine and folate supplementation   2. CV  1. Mild persistent tachycardia ()   2. MAP   3. Hx of cocaine abuse  1. No acute changes on last EKG  2. Normal trops throughout admission  3. Pulm  1. Extubated, saturations 90-93%  2. Weaned off O2; suplamental O2 PRN, keeps sats >88%   3. CXR: repeat pending  4. encourage IS and up out of bed  5. IS today < 500cc  4. GI/Nutrition  1. Ex lap ovalles's procedure, creation of end colostomy (2/28)   2. Re-opening laparotomy w/ washout and small sigmoid resection (3/7); fascia closed wound vac placed with white and black foam  3. Mild Ileus 3/7-8;  minimal ostomy output overnight; no nausea/vom; will continue to monitor   4. Ok to start advancing diet as tolerated  5. Continue TF at goal  6. Ostomy output 50cc overnight  7. CHANDNI drain 40cc serosanguinous overnight   5. Renal/lytes  1. BMP: 144/3.8/109/24/19/0.65  2. Potassium 3.8, will replace  3. Phos 2.6; will replace   4. Mg - 1.9; will replace  5. Hold maintenance fluids  6. Urine output 1 cc/kg/hr  7. Total since admission (+[id number])  1. Plan for additional 80 lasix this morning; repeat BMP consider additional 80mg in evening  2. Replace narayanan  6. Heme  1. DVT prophylaxis- levonox 40mg daily  2. Hgb 9.6 (10.5)  7.   Endocrine        0. Glucose 117; well controlled  7. Musculoskeletal  1.  PT/OT; encourage up out of bed and ambulating todya   8. Skin  1. No abrasions or lacerations   2. Abdominal incision - some surrounding erythema, no fluctuance noted, some serous drainage coming from incision site   9. ID/Micro   1. WBC 16.6 (13.5)   2. Afebrile  3. Continue zosyn for 4 days from last OR  4. procalcitonin 0.61     10. Family/dispo  1. Consider DC ketamine today  11. Lines  1. NG  2. Negrete            3. CHANDNI  4. Wound vac    CHECKLIST    CAM-ICU RASS: -1/0  RESTRAINTS: not indicated   IVF: hold maintenance fluids  NUTRITION: TF  ANTIBIOTICS: zosyn  GI: not indicated  DVT: lovenox   GLYCEMIC CONTROL: well controlled  HOB >45: ok  MOBILITY: ok for up out of bed    SARINA Ackerman had one episode of agitation this morning. Did not sleep well overnight. Mild tachycardia and hypertension associated with agitation. Agitation did resolve spontaneously. No fevers. 50cc ostomy output. OBJECTIVE  VITALS: Temp: Temp: 98.9 °F (37.2 °C)Temp  Av.4 °F (36.9 °C)  Min: 97.4 °F (36.3 °C)  Max: 99.8 °F (86.4 °C) BP Systolic (59NVX), LK , Min:101 , FPO:405   Diastolic (38XYA), RQJ:05, Min:56, Max:104   Pulse Pulse  Av.9  Min: 95  Max: 123 Resp Resp  Av.4  Min: 13  Max: 26 Pulse ox SpO2  Av.5 %  Min: 90 %  Max: 100 %    CONSTITUTIONAL: alert, no acute distress  HEENT: head atraumatic and normocephalic. PERRL. Conjunctiva normal  LUNGS: equal chest rise bilaterally  CV: tachycardic. Regular rhythm  GI: NG in place. dline incision with wound vac in place, mild diffuse post op tenderness worse along right side of incision, no distention. CHANDNI drain in place.    MUSCULOSKELETAL: moving all extremities  NEUROLOGIC: alert, oriented, following commands  SKIN: warm, dry     Drain/tube output:    Negrete/urine 1.4cc/kg/h  Chandni 40cc overnight (serosaguious)   Wound vac: 200 out overnight    LAB:  CBC:   Recent Labs     21  0554 21  0558 21  0422   WBC 13.5* 16.6* 20.4*   HGB 9.1* 10.5* 9.6*   HCT 29.6* 33.2* 30.9*   MCV 97.0 95.1 97.8    625* 674*     BMP:   Recent Labs     03/07/21  0554 03/08/21  0558 03/09/21  0422    143 144   K 3.8 3.8 3.8    109* 109*   CO2 26 25 24   BUN 19 20 19   CREATININE 0.66* 0.75 0.65*   GLUCOSE 104* 117* 116*         RADIOLOGY:  CXR:   XR CHEST PORTABLE   Final Result   Line placement, as detailed      Improvement in now mild vascular congestion      Stable asymmetric bibasilar opacities related to combined pleural-parenchymal   processes         XR CHEST PORTABLE   Final Result   1. Small bilateral pleural effusions and bibasilar airspace disease, right   greater than left. 2. Mild to moderate pulmonary vascular congestion. 3. Right IJ approach central venous catheter and enteric tube as detailed   above. XR ABDOMEN (KUB) (SINGLE AP VIEW)   Final Result   Findings suggestive of ileus with gaseous distension of the stomach and small   bowel loops. XR CHEST PORTABLE   Final Result   Stable exam since yesterday. XR CHEST PORTABLE   Final Result   Slightly decreased congestion and bilateral pleural effusions. CT CHEST ABDOMEN PELVIS W CONTRAST   Final Result   1. Interval development of bilateral small pleural effusions and sizable   posterior basal lower lobe segmental atelectasis with minimal middle lobe act   subsegmental atelectasis. 2. Interval left lower quadrant colostomy with Marcia's pouch. Some   fluid-filled dilated small bowel loops in the left mid abdomen nonspecific   and probably represent evolving postoperative ileus. Attention on follow-up   to exclude obstruction. 3. Small to moderate volume free fluid in the pelvis and flanks with some gas   pockets. Sterility is indeterminate. Of note the fluid along the flanks is   of lower density alike prior study where there was higher density fluid   reported.          XR CHEST PORTABLE   Final Result   Small bilateral pleural effusions with associated bibasilar airspace disease. No significant interval change. XR CHEST (SINGLE VIEW FRONTAL)   Final Result   1. Support devices appear unchanged in position. 2. Persistent bibasilar opacification slightly worsened on the left. 3. Likely small bilateral pleural effusions. XR CHEST PORTABLE   Final Result   Slightly improved aeration. The endotracheal tube and nasogastric tube   remain in position. The right jugular vein central line ends in the distal   SVC. Persistent bibasilar airspace disease, greater on the right, and small   right pleural effusion. No pneumothorax or significant left pleural effusion. XR ABDOMEN FOR NG/OG/NE TUBE PLACEMENT   Final Result   Nasogastric tube tip is in the stomach. XR CHEST PORTABLE   Final Result   Increasing airspace opacities within the right mid to lower lung. XR CHEST PORTABLE   Final Result   1. Shallow inflation. Perihilar and basilar opacities are noted, which may   represent atelectasis and vascular crowding. Developing edema or   inflammatory process should be considered in the appropriate clinical setting. 2.  Suspect small pleural effusions. XR ABDOMEN FOR NG/OG/NE TUBE PLACEMENT   Final Result   Enteric tube as above. No evidence of bowel obstruction. RECOMMENDATIONS:   Advancing tube 9 cm         CT ABDOMEN PELVIS W IV CONTRAST Additional Contrast? None   Final Result   Free fluid adjacent to the liver and spleen, in the pericolic gutters, and   within the pelvis that is isodense to muscle and may contain proteinaceous   material.  This is of uncertain etiology. Distended gallbladder and correlation with sonography may be helpful in   further characterization. Uncomplicated colonic diverticulosis.                  Jennifer Masters DO  3/9/21, 7:38 AM

## 2021-03-09 NOTE — PROGRESS NOTES
Occupational Therapy   Occupational Therapy Initial Assessment  Date: 3/9/2021   Patient Name: Regina Em  MRN: [id number]     : 1980    Date of Service: 3/9/2021  H and P: The patient is E 84 y.o. male  who presents with 3d h/o progressively worsening abdominal pain. Pain acutely started 3 days ago. Has been constipated w/ last bm 3 days ago, nauseous w/out emesis. Due to worsening pain pt came to ED for evaluation. No fever, chills. Denies sob, cp. No recent sick contacts. Denies hematochezia/melena.      Has h/o chronic abdominal pain. Had colonoscopy 2019 w/ removal of polyp (benign) and diverticulosis. Thinks mother had IBD, she is . Pt underwent EXPLORATORY LAPAROTOMY, MOBILIZATION OF FALSIFORM LIGAMENT, CATTELL-BRESCH MANEUVER, DINA MANEUVER, CONTROL OF PELVIC HEMORRHAGE, GREENE'S PROCEDURE, KOCHERIZATION OF DUODENUM, CREATION OF END COLOSTOMY for perforated bowel (3/4/21)    Discharge Recommendations:  Patient would benefit from continued therapy after discharge  OT Equipment Recommendations  Equipment Needed: Yes  Mobility Devices: Dulcie Pila; ADL Assistive Devices  Walker: Rolling  ADL Assistive Devices: Shower Chair with back; Hand-held Shower;Grab Bars - shower  Other: DME to continue to be assessed as Pt progresses. Assessment   Performance deficits / Impairments: Decreased functional mobility ; Decreased safe awareness;Decreased balance;Decreased coordination;Decreased ADL status; Decreased endurance;Decreased high-level IADLs;Decreased strength;Decreased fine motor control  Assessment: Pt would benefit from continued therapy upon discharge. Pt currently requires CGA for sit to stands. He requires MIN A for grooming tasks, MIN A for UB dressing, and DEP for LB dressing and toileting. Pt has poor safety awareness, decreased BUE strength, and decreased [address]. If discharged home at this time, Pt would require 24 hour care.   Treatment Diagnosis: perforated bowel  Prognosis: Fair  Decision Making: High Complexity  OT Education: OT Role;Plan of Care  Patient Education: Pt educated on OT role and OT POC with fair understanding. Barriers to Learning: decreased cognition  REQUIRES OT FOLLOW UP: Yes  Activity Tolerance  Activity Tolerance: Treatment limited secondary to medical complications (free text)  Activity Tolerance: Tx limited secondary to tachycardia. Pt's + just in standing. Safety Devices  Safety Devices in place: Yes  Type of devices: Gait belt;Left in chair;Chair alarm in place;Call light within reach;Nurse notified  Restraints  Initially in place: No           Patient Diagnosis(es): The encounter diagnosis was Acute abdomen. has a past medical history of History of colon polyps and IBS (irritable bowel syndrome). has a past surgical history that includes Colonoscopy (N/A, 10/25/2019); laparotomy (N/A, 2/28/2021); Abdomen surgery (02/28/2021); Abdomen surgery (03/07/2021); and laparotomy (N/A, 3/7/2021). Treatment Diagnosis: perforated bowel      Restrictions  Restrictions/Precautions  Restrictions/Precautions: General Precautions, Surgical Protocols, Fall Risk, Up as Tolerated  Required Braces or Orthoses?: No  Position Activity Restriction  Other position/activity restrictions: wound vac abdomen, NG tube, narayanan, colostomy    Subjective   General  Chart Reviewed: Yes  Patient assessed for rehabilitation services?: Yes  Family / Caregiver Present: No  General Comment  Comments: RN okayed for Pt to be seen for OT evaluation. Pt required redirection throughout to attend to task. Patient Currently in Pain: Yes  Pain Assessment  Pain Assessment: 0-10  Pain Level: 10  Patient's Stated Pain Goal: No pain  Pain Type: Surgical pain  Pain Location: Abdomen  Pain Orientation: Mid  Pain Descriptors: Stabbing; Sharp  Pain Frequency: Continuous  Pain Onset: On-going  Clinical Progression: Not changed  Functional Pain Assessment: Prevents or interferes some active activities and ADLs Non-Pharmaceutical Pain Intervention(s): Repositioned; Ambulation/Increased Activity  Response to Pain Intervention: Patient Satisfied  Multiple Pain Sites: No  Vital Signs  Temp: 98.2 °F (36.8 °C)  Temp Source: Oral  Pulse: 101  Heart Rate Source: Monitor  Resp: 19  BP: 130/81  BP Location: Right upper arm  MAP (mmHg): 95  Patient Position: Semi fowlers  Level of Consciousness: Alert (0)  MEWS Score: 4  Patient Currently in Pain: Yes  Oxygen Therapy  SpO2: 95 %  Pulse Oximeter Device Mode: Continuous  Pulse Oximeter Device Location: Toe  Social/Functional History  Social/Functional History  Lives With: Significant other, Son, Daughter  Type of Home: House  Home Layout: Two level, Bed/Bath upstairs, 1/2 bath on main level  Home Access: Stairs to enter with rails  Entrance Stairs - Number of Steps: 2  Entrance Stairs - Rails: Left  Bathroom Shower/Tub: Walk-in shower  Bathroom Toilet: Handicap height  Bathroom Equipment: Shower chair, Grab bars around toilet, Grab bars in shower  Home Equipment: (Pt was not using any DME prior to incident)  Receives Help From: Family  ADL Assistance: Independent  Homemaking Assistance: Independent  Homemaking Responsibilities: Yes(Pt shares all IADLs with wife and children)  Meal Prep Responsibility: Primary  Laundry Responsibility: Primary  Cleaning Responsibility: Primary  Bill Paying/Finance Responsibility: Primary  Shopping Responsibility: Primary  Ambulation Assistance: Independent  Transfer Assistance: Independent  Active : Yes  Mode of Transportation: [facility]  Occupation: Unemployed  Leisure & Hobbies: hanging out with children  IADL Comments: Pt shares IADLs with wife and children  Additional Comments: Pt's wife can help at home as needed. She no longer works.        Objective   Vision: Within Functional Limits  Hearing: Exceptions to [facility]  Hearing Exceptions: Hard of hearing/hearing concerns    Orientation  Overall Orientation Status: Impaired  Orientation Level: Oriented to met  Transfers  Sit to stand: Contact guard assistance  Stand to sit: Contact guard assistance  Transfer Comments: Pt able to complete sit to stand and stand to sit with CGA for safety awareness, hand placement, and maintaining upright posture. Once in standing, Pt tachycardic limiting ability to engage in further functional mobility or transfers. Pt's HR stayed above 140 warranting the need to sit down. Once seated, Pt engaged in ADL tasks, with HR staying between 125 and 135. Nursing notified and aware. Cognition  Overall Cognitive Status: Exceptions  Following Commands: Follows multistep commands with increased time; Follows multistep commands consistently  Attention Span: Attends with cues to redirect  Safety Judgement: Decreased awareness of need for assistance;Decreased awareness of need for safety  Insights: Decreased awareness of deficits  Initiation: Requires cues for some  Sequencing: Requires cues for some  Cognition Comment: A and O x4        Sensation  Overall Sensation Status: WFL        LUE AROM (degrees)  LUE AROM : WFL  Left Hand AROM (degrees)  Left Hand AROM: WFL  RUE AROM (degrees)  RUE AROM : WFL  Right Hand AROM (degrees)  Right Hand AROM: WFL  LUE Strength  Gross LUE Strength: Exceptions to [facility]  L Shoulder Flex: 3+/5  L Shoulder Ext: 3+/5  L Shoulder ABduction: 3+/5  L Elbow Flex: 3+/5  L Elbow Ext: 3+/5  L Wrist Flex: 3+/5  L Wrist Ext: 3+/5  L Hand General: 3+/5  RUE Strength  Gross RUE Strength: Exceptions to [facility]  R Shoulder Flex: 3+/5  R Shoulder Ext: 3+/5  R Shoulder ABduction: 3+/5  R Elbow Flex: 3+/5  R Elbow Ext: 3+/5  R Wrist Flex: 3+/5  R Wrist Ext: 3+/5  R Hand General: 3+/5                   Plan   Plan  Times per week: 3-4x/wk  Current Treatment Recommendations: Strengthening, Safety Education & Training, Balance Training, Patient/Caregiver Education & Training, Self-Care / ADL, Functional Mobility Training, Equipment Evaluation, Education, & procurement, Home Management

## 2021-03-10 NOTE — PROGRESS NOTES
Patient become very agitated about pain medications being given \"late. \" He was threatening to pull all lines out (Caden PINEDO, CHANDNI drain) and becoming agitated and verbally aggressive with staff and wife. He was A&Ox4 but very frustrated, stating he \"doesn't feel safe here\". He was getting out of bed trying to walk out of room. I perfectserved Trauma surg on-call resident (Dr. Krista Bahena) to come to bedside to explain the risks again of leaving AMA. Patient adamant on his decision to leave AMA and head to [facility]. Wife and child educated many times on the risks of him leaving. Wife tried to reason with him to stay however. Resident contacted his senior resident to talk to patient about risks of leaving AMA as well. I removed 3 IVs, Caden PINEDO, disconnectted wound vac but patient did not wait for wet to dry dressing, CHANDNI remained. Patient signed AMA papers and walked himself out of the hospital with wife and daughter at his side.

## 2021-03-10 NOTE — PROGRESS NOTES
Left AGAINST MEDICAL ADVICE    Writer consulted by nursing staff that patient is requesting to leave [address] I responded to the room immediately. Patient is alert and oriented x3, aware of person, place, and time. Patient is yelling and screaming at staff. Patient stating that he wants to leave, stating that he does not \"feel safe here\". Conflict resolution was attempted, patient stated there was nothing we could do to make him feel more comfortable or safer. Patient continued answering questions appropriately though agitated. Medications were offered to help with the patient's anxiety which he denied. He was noted to have stable vitals other than tachycardia. Patient states that he wants his NG tube out, wants his Negrete out, does not feel he is being taken care of well. Nursing staff and myself had multiple conversations with patient that if he left he be at risk for severe infection and/or death, patient was able to repeat this back to me. In addition Dr. Neel Pemberton had conversation with the patient recommending against him leaving. Extensive conversation had with spouse who also attempted to reason with patient about the risks, she clearly stated to him that this choice would put him at risk for death in addition to risk to the family. Spouse stated that he is frustrated that he did not receive pain medication for 1 hour this evening, I discussed with patient would be glad to get him pain medication and ensure that this does not happen again which he declined. Patient continued to have no interest in staying. Patient was offered to be transferred to [facility], which patient said this would take too long and wanted to leave now.   Patient was noted to have decision making capacity and continued to present alert and oriented x3, he was provided [address] paperwork, which was reviewed with patient including liability to [address], liability to physicians and staff, additional injury, worsening condition, death, and risk of non insurance payment, patient signed without being under duress. Patient was informed that he is welcome to return to this facility at any time and we would be glad to take care of him, he was encouraged to immediately go to another facility if he wished not to return here. Negrete, NG tube and IVs were removed, wound VAC was disconnected, CHANDNI drain was kept in. Patient was able to stand without any assistance and ambulated to the door. Multiple discussions were had with patient in the hughes in addition to a discussion with attending physician, Dr. Brandi Issa, which patient ignored. Conversation was had with spouse and we recommended her encouragement for him to come back and to be treated or for them to immediately take him to another facility where he can receive continued care as he is not ready for discharge and has immediate wound care needs that puts him at risk for death. Patient was able to operate elevator, walk into elevator without any difficulty and left.

## 2021-03-15 NOTE — DISCHARGE SUMMARY
DISCHARGE SUMMARY:    PATIENT NAME:  Michael Dugan  YOB: 1980  MEDICAL RECORD NO. [id number]  DATE: 03/15/21  PRIMARY CARE PHYSICIAN: Mayi Quiñones DO  ADMIT DATE:  2/28/2021    DISCHARGE DATE:  3/9/2021  DISPOSITION:  Left against medical advice  ADMITTING DIAGNOSIS:   Perforated bowel    DIAGNOSIS:   Patient Active Problem List   Diagnosis    History of colon polyps    Perfor[facility] bowel ([facility]    Perforated rectum ([facility]       CONSULTANTS: None    PROCEDURES: Central line placement, endotracheal intubation, Exlap durham's with creation of End colostomy, Exlap washout and wound vac application. HOSPITAL COURSE:   Michael Dugan is a 36 y.o. male who was admitted on 2/28/2021  Hospital Course: Patient had extensive hospital course, patient was admitted due to abdominal abscess abdominal free fluid that required surgical intervention, patient had exploratory laparotomy with control of pelvic hemorrhage, Durham's procedure, kocherization of duodenum and creation of end colostomy. Patient then was placed onto the floor stepdown unit, at this time he became tachycardic and febrile and diaphoretic, concern for patient to be in withdrawal.  Patient was transferred to ICU for further management where he was intubated. Patient continued being managed in the ICU for withdrawal and concern for infection. Patient went back to the OR for repeat ex lap and washout. Patient was weaned off of Precedex and ketamine for sedation was extubated and transferred out of the unit. At this time once patient arrived to the floor, patient stated that he was not being taken care of well and wanted to leave AMA, extensive discussion with patient the risks of leaving [address] was had with patient, patient was ANO x3 and had decision-making capacity and signed AMA paperwork and ambulated with wife and daughter out of the hospital.      Labs and imaging were followed daily.       On day of discharge Michael Dugan  was tolerating a thin liquids  had adequate analgeia on oral medications  had continued signs of complication secondary to surgery  He was not deemed medically stable for discharged to [facility] but was deemed alert and oriented and had decision-making capacity to leave on his choice. PHYSICAL EXAMINATION:        Discharge Vitals:  height is 5' 9\" (1.753 m) and weight is 182 lb 5.1 oz (82.7 kg). His temporal temperature is 98.2 °F (36.8 °C). His blood pressure is 145/94 (abnormal) and his pulse is 95. His respiration is 19 and oxygen saturation is 93%. Exam on day of discharge:  Savanna Bernardo had one episode of agitation this morning. Did not sleep well overnight. Mild tachycardia and hypertension associated with agitation. Agitation did resolve spontaneously. No fevers. 50cc ostomy output.         OBJECTIVE  VITALS: Temp: Temp: 98.9 °F (37.2 °C)Temp  Av.4 °F (36.9 °C)  Min: 97.4 °F (36.3 °C)  Max: 99.8 °F (65.6 °C) BP Systolic (54FVI), YFQ:629 , Min:101 , GQI:182   Diastolic (88DKS), HVV:98, Min:56, Max:104   Pulse Pulse  Av.9  Min: 95  Max: 123 Resp Resp  Av.4  Min: 13  Max: 26 Pulse ox SpO2  Av.5 %  Min: 90 %  Max: 100 %     CONSTITUTIONAL: alert, no acute distress  HEENT: head atraumatic and normocephalic. PERRL. Conjunctiva normal  LUNGS: equal chest rise bilaterally  CV: tachycardic. Regular rhythm  GI: NG in place. dline incision with wound vac in place, mild diffuse post op tenderness worse along right side of incision, no distention. TORREY drain in place.   MUSCULOSKELETAL: moving all extremities  NEUROLOGIC: alert, oriented, following commands  SKIN: warm, dry     Drain/tube output:    Negrete/urine 1.4cc/kg/h  Torrey 40cc overnight (serosaguious)   Wound vac: 200 out overnight       LABS:   No results for input(s): WBC, HGB, HCT, PLT, NA, K, CL, CO2, BUN, CREATININE in the last 72 hours.     DIAGNOSTIC TESTS:    Ct Abdomen Pelvis W Iv Contrast Additional Contrast? None    Result Date: 2021 EXAMINATION: CT OF THE ABDOMEN AND PELVIS WITH CONTRAST 2/28/2021 8:00 am TECHNIQUE: CT of the abdomen and pelvis was performed with the administration of intravenous contrast. Multiplanar reformatted images are provided for review. Dose modulation, iterative reconstruction, and/or weight based adjustment of the mA/kV was utilized to reduce the radiation dose to as low as reasonably achievable. COMPARISON: CT abdomen and pelvis performed 10/13/2019. HISTORY: ORDERING SYSTEM PROVIDED HISTORY: Severe abdominal pain, tachycardia TECHNOLOGIST PROVIDED HISTORY: Severe abdominal pain, tachycardia Decision Support Exception->Emergency Medical Condition (MA) FINDINGS: Lower Chest: The lung bases are without consolidation or effusion. The visualized cardiac structures are unremarkable. Organs: Liver and spleen are normal size and overall attenuation. Gallbladder is distended. Pancreas and adrenal glands are unremarkable. The kidneys are without obstructive uropathy. The ureters are not dilated. The urinary bladder is unremarkable. GI/Bowel: The stomach is unremarkable. Loops of small bowel are normal in caliber without evidence for obstruction. The colon contains air and fecal residue. There are scattered uncomplicated diverticula. The appendix is normal.  There is free fluid adjacent to the liver and spleen. There is also free fluid in the pericolic gutters extending into the pelvis. This fluid is isodense to muscle and may contain proteinaceous material of uncertain origin. Pelvis: Prostate gland and seminal vesicles are unremarkable. Peritoneum/Retroperitoneum: The psoas muscles are symmetric. The abdominal aorta is normal in caliber. The inferior vena cava is unremarkable. There is no retroperitoneal or mesenteric adenopathy. Bones/Soft Tissues: The extra-abdominal soft tissues are unremarkable. There is no acute osseous abnormality.      Free fluid adjacent to the liver and spleen, in the pericolic gutters, and within the pelvis that is isodense to muscle and may contain proteinaceous material.  This is of uncertain etiology. Distended gallbladder and correlation with sonography may be helpful in further characterization. Uncomplicated colonic diverticulosis. Xr Chest Portable    Result Date: 3/1/2021  EXAMINATION: ONE XRAY VIEW OF THE CHEST 3/1/2021 8:56 am COMPARISON: Chest radiograph 10/13/2019. CT abdomen and pelvis yesterday. HISTORY: ORDERING SYSTEM PROVIDED HISTORY: shortness of breath TECHNOLOGIST PROVIDED HISTORY: shortness of breath Reason for Exam: upright portable FINDINGS: The enteric tube terminates at the level of the body of the stomach. Shallow inflation. Perihilar and basilar linear opacities with blunting of the costophrenic angles. No pneumothorax identified. The cardiac and mediastinal contours appear unchanged. 1.  Shallow inflation. Perihilar and basilar opacities are noted, which may represent atelectasis and vascular crowding. Developing edema or inflammatory process should be considered in the appropriate clinical setting. 2.  Suspect small pleural effusions. Xr Abdomen For Ng/og/ne Tube Placement    Result Date: 2/28/2021  EXAMINATION: ONE SUPINE XRAY VIEW(S) OF THE ABDOMEN 2/28/2021 11:42 am COMPARISON: None. HISTORY: ORDERING SYSTEM PROVIDED HISTORY: Confirmation of course of NG/OG/NE tube and location of tip of tube TECHNOLOGIST PROVIDED HISTORY: Confirmation of course of NG/OG/NE tube and location of tip of tube Portable? ->Yes Reason for Exam: confirmation of course of NG/OG/NE tube and location of tip of tube Acuity: Unknown FINDINGS: There is an enteric tube with its tip projecting over the upper portion of the stomach. Proximal port is at the GE junction. No evidence of bowel obstruction. Enteric tube as above. No evidence of bowel obstruction.  RECOMMENDATIONS: Advancing tube 9 cm       DISCHARGE INSTRUCTIONS     Discharge Medications:        Medication List      STOP taking these medications    ALPRAZolam 0.25 MG tablet  Commonly known as: XANAX     PARoxetine 20 MG tablet  Commonly known as: Paxil        ASK your doctor about these medications    acetaminophen 325 MG tablet  Commonly known as: Tylenol  Take 2 tablets by mouth every 6 hours as needed for Pain     dicyclomine 10 MG capsule  Commonly known as: Bentyl  Take 1 capsule by mouth every 6 hours as needed (cramps)     levothyroxine 75 MCG tablet  Commonly known as: Synthroid  Take 1 tablet by mouth daily Take with water on an empty stomach- wait 30 minutes before eating or taking other meds. ondansetron 4 MG tablet  Commonly known as: Zofran  Take 1 tablet by mouth every 8 hours as needed for Nausea     oxyCODONE-acetaminophen 5-325 MG per tablet  Commonly known as: PERCOCET     pantoprazole 40 MG tablet  Commonly known as: Protonix  Take 1 tablet by mouth daily (with breakfast)     promethazine 25 MG tablet  Commonly known as: PHENERGAN          Diet: No diet orders on file diet as tolerated  Activity: As instructed WEIGHT BEARING STATUS: Weight bearing as tolerated  Wound Care: Patient instructed to go to another facility for continued wound care at the left with a wound VAC in place and required wet-to-dry dressing which he did not want placed here. Patient also informed that he come back here at any moment if he wishes to obtain care.     DISPOSITION: AMA    Follow-up:  [facility]  [address]  [address] [facility] [address] [place] [id number] [phone]  Schedule an appointment as soon as possible for a visit in 2 weeks  follow up [facility]        SIGNED:  Magaly Dye DO   3/15/2021, 6:39 AM  Time Spent for discharge: 60 minutes

## 2021-03-17 NOTE — ED NOTES
Mode of arrival (squad #, walk in, police, etc) : walk in         Chief complaint(s): post op problem         Arrival Note (brief scenario, treatment PTA, etc). : patient states he was admitted at [facility] today was was waiting for discharge when he kept getting the \"run around\". Patient states he left prior to being discharged today because he had plans. Patient states his abdominal wound dressing was changed today and there is no new redness or swelling at incision site. Patient states he received a phone call stating his WBC was elevated and there could be infection. Patient is alert and oriented x3. Patient states his original surgery took place at [facility]. V's.         C= \"Have you ever felt that you should Cut down on your drinking? \"  No  A= \"Have people Annoyed you by criticizing your drinking? \"  No  G= \"Have you ever felt bad or Guilty about your drinking? \"  No  E= \"Have you ever had a drink as an Eye-opener first thing in the morning to steady your nerves or to help a hangover? \"  No      Deferred []      Reason for deferring: N/A    *If yes to two or more: probable alcohol abuse. Jassi Guillen RN  03/17/21 [id number]

## 2021-03-18 NOTE — ED PROVIDER NOTES
EMERGENCY DEPARTMENT ENCOUNTER   ATTENDING ATTESTATION     Pt Name: Myles Madison  MRN: [id number]  [place] 1980  Date of evaluation: 3/17/21       Myles Madison is a 36 y.o. male who presents with Post-op Problem      MDM:   His home care agency [facility] told him to go to the hospital and get checked out because his WBC was high on blood test.    Recent laparotomy at at [place], was on iv vancomycin for infection, has tila drains in place with serosanguinous drainage, abd nontender now, wounds healing CDI. Reviewed labs  We strongly rec transfer back to [facility] where he signed out [place] today from. We attempted to call his surgeon, but he refuses to stay any longer. He refuses, he states he is not staying in any hospital.    The patient has decided to leave against medical advice and is refusing all further workup and testing. The patient has normal mental status and adequate capacity to make medical decisions and has the capacity to make decisions. The patient refuses hospital admission and wants to be discharged. The risks have been explained to the patient, including worsening illness, chronic pain, permanent disability, loss of organs, and death. The benefits of further testing and admission have also been explained, including the availability and proximity of nurses, physicians, monitoring, diagnostic testing, and treatment. The patient was able to understand and state the risks and benefits of hospital admission. This was witnessed by the nurse and me. The patient had the opportunity to ask questions about medical conditions. The patient was treated to the extent that the patient allowed, and knows that may return for care at any time. Follow up has been discussed patient will see pcp tomorrow.     Vitals:   Vitals:    03/17/21 1818   BP: 131/87   Pulse: 114   Resp: 15   Temp: 98.6 °F (37 °C)   TempSrc: Oral   SpO2: 99%   Weight: 160 lb (72.6 kg)   Height: 5' 10\" (1.778 m)         I personally evaluated and examined the patient in conjunction with the resident and agree with the assessment, treatment plan, and disposition of the patient as recorded by the resident. I performed a history and physical examination of the patient and discussed management with the resident. I reviewed the residents note and agree with the documented findings and plan of care. Any areas of disagreement are noted on the chart. I was personally present for the key portions of any procedures. I have documented in the chart those procedures where I was not present during the key portions. I have personally reviewed all images and agree with the resident's interpretation. I have reviewed the emergency nurses triage note. I agree with the chief complaint, past medical history, past surgical history, allergies, medications, social and family history as documented unless otherwise noted.     Ashley Alejo MD  Attending Emergency Physician            Ashley Alejo MD  03/17/21 2100

## 2021-03-18 NOTE — ED PROVIDER NOTES
[address]  Emergency Department Encounter  EmergencyMedicine Resident     Pt Blessing Ledesma  MRN: [id number]  [place] 1980  Date of evaluation: 3/18/21  PCP:  Markel Brock DO    CHIEF COMPLAINT       Chief Complaint   Patient presents with    Post-op Problem       HISTORY OF PRESENT ILLNESS  (Location/Symptom, Timing/Onset, Context/Setting, Quality, Duration, Modifying Factors, Severity.)      Valorie Casanova is a 36 y.o. male who presents with concern for infection. Patient notes that this past month he has had 2 abdominal surgeries for diverticulitis. Patient initially presented on 2/28/2021 to [facility] for abdominal pain. Patient found to have diverticulitis with perforated bowel and rectum. Patient underwent an expiratory laparotomy and had a [facility]'s procedure done. Patient bacterial for washout and, retrospectively, ICU he is extubated. Patient did not feel he was being taken care of well at [facility] and left AMA. Patient then went to to the hospital for abdominal pain evisceration of his wound, 3/10/2021. Patient was discharged and follow-up outpatient with general surgery office. And had surgery with Dr. Jennifer Cabezas on 3/15/1 and currently has a drain in place then. Patient notes that earlier today, he thought that is more important to leave the hospital then received IV antibiotics as he was going by car. Patient is only presenting now because his wife made a comment when a [facility] home health called and said had a significantly elevated white count test done prior to the weekend. Patient denies any Fevers, chills, chest pain, shortness of breath, cough, worsening abdominal pain, nausea/vomiting, anorexia, decreased ostomy output, diaphoresis, numbness itching or weakness. Patient notes that he is only here because his wife was concerned with elevated white count.     PAST MEDICAL / SURGICAL / SOCIAL / FAMILY HISTORY      has a past medical history of History of colon polyps and IBS (irritable bowel syndrome). has a past surgical history that includes Colonoscopy (N/A, 10/25/2019); laparotomy (N/A, 2/28/2021); Abdomen surgery (02/28/2021); Abdomen surgery (03/07/2021); and laparotomy (N/A, 3/7/2021).       Social History     Socioeconomic History    Marital status:      Spouse name: Not on file    Number of children: Not on file    Years of education: Not on file    Highest education level: Not on file   Occupational History    Not on file   Social Needs    Financial resource strain: Not on file    Food insecurity     Worry: Not on file     Inability: Not on file    Transportation needs     Medical: Not on file     Non-medical: Not on file   Tobacco Use    Smoking status: Current Every Day Smoker     Packs/day: 0.50     Years: 15.00     Pack years: 7.50     Types: Cigarettes    Smokeless tobacco: Never Used   Substance and Sexual Activity    Alcohol use: No     Frequency: Never    Drug use: Yes     Types: Marijuana     Comment: last night    Sexual activity: Not on file   Lifestyle    Physical activity     Days per week: Not on file     Minutes per session: Not on file    Stress: Not on file   Relationships    Social connections     Talks on phone: Not on file     Gets together: Not on file     Attends Congregation service: Not on file     Active member of club or organization: Not on file     Attends meetings of clubs or organizations: Not on file     Relationship status: Not on file    Intimate partner violence     Fear of current or ex partner: Not on file     Emotionally abused: Not on file     Physically abused: Not on file     Forced sexual activity: Not on file   Other Topics Concern    Not on file   Social History Narrative    Not on file       Family History   Problem Relation Age of Onset    Emphysema Mother 61        lifelong smoker    Crohn's Disease Mother     Heart Disease Father         had a triple bypass @ 39 yoa  High Blood Pressure Father        Allergies:  Patient has no known allergies. Home Medications:  Prior to Admission medications    Medication Sig Start Date End Date Taking? Authorizing Provider   promethazine (PHENERGAN) 25 MG tablet Take 25 mg by mouth every 6 hours as needed for Nausea    Historical Provider, MD   levothyroxine (SYNTHROID) 75 MCG tablet Take 1 tablet by mouth daily Take with water on an empty stomach- wait 30 minutes before eating or taking other meds. 10/29/19   Faviola Cavazos, DO   oxyCODONE-acetaminophen (PERCOCET) 5-325 MG per tablet Take 1 tablet by mouth every 6 hours as needed for Pain. Historical Provider, MD   ondansetron (ZOFRAN) 4 MG tablet Take 1 tablet by mouth every 8 hours as needed for Nausea 10/13/19   Catherine Maxwell, DO   dicyclomine (BENTYL) 10 MG capsule Take 1 capsule by mouth every 6 hours as needed (cramps) 10/13/19   Catherine Tony Quest, DO   acetaminophen (TYLENOL) 325 MG tablet Take 2 tablets by mouth every 6 hours as needed for Pain 10/13/19   Catherine Maxwell, DO   pantoprazole (PROTONIX) 40 MG tablet Take 1 tablet by mouth daily (with breakfast) 9/25/19   Faviola Cavazos, DO       REVIEW OF SYSTEMS    (2-9 systems for level 4, 10 or more for level 5)      Review of Systems   Constitutional: Negative for chills, fatigue and fever. HENT: Negative for congestion and sore throat. Eyes: Negative for photophobia and visual disturbance. Respiratory: Negative for cough and shortness of breath. Cardiovascular: Negative for chest pain, palpitations and leg swelling. Gastrointestinal: Positive for abdominal pain (baseline from surgery). Negative for nausea and vomiting. Genitourinary: Negative for dysuria and hematuria. Musculoskeletal: Negative for arthralgias and myalgias. Skin: Negative for rash and wound. Neurological: Negative for weakness and numbness.        PHYSICAL EXAM   (up to 7 for level 4, 8 or more for level 5)      INITIAL VITALS:   BP 131/87   Pulse 114   Temp 98.6 °F (37 °C) (Oral)   Resp 15   Ht 5' 10\" (1.778 m)   Wt 160 lb (72.6 kg)   SpO2 99%   BMI 22.96 kg/m²     Physical Exam  Vitals signs and nursing note reviewed. Constitutional:       General: He is not in acute distress. Appearance: Normal appearance. He is well-developed and normal weight. He is not toxic-appearing or diaphoretic. HENT:      Head: Normocephalic and atraumatic. Right Ear: External ear normal.      Left Ear: External ear normal.      Nose: Nose normal.      Mouth/Throat:      Mouth: Mucous membranes are moist.   Eyes:      Conjunctiva/sclera: Conjunctivae normal.   Neck:      Musculoskeletal: Normal range of motion and neck supple. No neck rigidity. Vascular: No JVD. Trachea: No tracheal deviation. Cardiovascular:      Rate and Rhythm: Regular rhythm. Tachycardia present. Pulses: Normal pulses. Heart sounds: Normal heart sounds, S1 normal and S2 normal. No murmur. No friction rub. No gallop. Pulmonary:      Effort: Pulmonary effort is normal. No respiratory distress. Breath sounds: Normal breath sounds. Abdominal:      General: Abdomen is flat. There is no distension. Palpations: Abdomen is soft. Tenderness: There is no abdominal tenderness. There is no guarding or rebound. Comments: Patient has 2 CHANDNI drains in place with serosanguineous output. Left lower quadrant has a colostomy that is well vascularized, and has green stool in the colostomy bag. Midline surgical incisions are intact with no erythema, warmth, tenderness, or discharge, or induration. Musculoskeletal: Normal range of motion. General: No swelling or tenderness (b/l calves are non-tender). Skin:     General: Skin is warm and dry. Capillary Refill: Capillary refill takes less than 2 seconds. Neurological:      Mental Status: He is alert and oriented to person, place, and time. Motor: No abnormal muscle tone. DIFFERENTIAL  DIAGNOSIS     PLAN (LABS / IMAGING / EKG):  Orders Placed This Encounter   Procedures    CBC Auto Differential    Basic Metabolic Panel w/ Reflex to MG    Inpatient consult to General Surgery       MEDICATIONS ORDERED:  No orders of the defined types were placed in this encounter. DDX: Intra-abdominal abscess. Electrolyte abnormality.     DIAGNOSTIC RESULTS / EMERGENCY DEPARTMENT COURSE / MDM   LAB RESULTS:  Results for orders placed or performed during the hospital encounter of 03/17/21   CBC Auto Differential   Result Value Ref Range    WBC 9.5 3.5 - 11.0 k/uL    RBC 2.97 (L) 4.5 - 5.9 m/uL    Hemoglobin 8.8 (L) 13.5 - 17.5 g/dL    Hematocrit 26.9 (L) 41 - 53 %    MCV 90.6 80 - 100 fL    MCH 29.7 26 - 34 pg    MCHC 32.7 31 - 37 g/dL    RDW 14.1 11.5 - 14.9 %    Platelets 996 (H) 408 - 450 k/uL    MPV 6.8 6.0 - 12.0 fL    NRBC Automated NOT REPORTED per 100 WBC    Differential Type NOT REPORTED     Immature Granulocytes NOT REPORTED 0 %    Absolute Immature Granulocyte NOT REPORTED 0.00 - 0.30 k/uL    WBC Morphology NOT REPORTED     RBC Morphology NOT REPORTED     Platelet Estimate NOT REPORTED     Seg Neutrophils 71 (H) 36 - 66 %    Lymphocytes 23 (L) 24 - 44 %    Monocytes 2 1 - 7 %    Eosinophils % 3 0 - 4 %    Basophils 1 0 - 2 %    Segs Absolute 6.73 1.3 - 9.1 k/uL    Absolute Lymph # 2.19 1.0 - 4.8 k/uL    Absolute Mono # 0.19 0.1 - 1.3 k/uL    Absolute Eos # 0.29 0.0 - 0.4 k/uL    Basophils Absolute 0.10 0.0 - 0.2 k/uL    Morphology ANISOCYTOSIS PRESENT     Morphology POLYCHROMASIA     Morphology TEARDROPS    Basic Metabolic Panel w/ Reflex to MG   Result Value Ref Range    Glucose 100 (H) 70 - 99 mg/dL    BUN 10 6 - 20 mg/dL    CREATININE 0.65 (L) 0.70 - 1.20 mg/dL    Bun/Cre Ratio NOT REPORTED 9 - 20    Calcium 8.7 8.6 - 10.4 mg/dL    Sodium 138 135 - 144 mmol/L    Potassium 3.9 3.7 - 5.3 mmol/L    Chloride 104 98 - 107 mmol/L    CO2 27 20 - 31 mmol/L    Anion Gap 7 (L) 9 - 17 mmol/L    GFR Non-African American >60 >60 mL/min    GFR African American >60 >60 mL/min    GFR Comment          GFR Staging NOT REPORTED        IMPRESSION: 41 presents for concern for intra-abdominal infection s/p multiple abdominal surgeries for diverticulitis and perforated bowel and stomach. Patient peers to be no acute distress and nontoxic-appearing. Patient is no signs respiratory distress with clear lung sounds bilaterally. Patient is tachycardic with regular rhythm, but notes that he did just walk here and is quite angry about the situation. Patient does have 2 CHANDNI drains in place with serosanguineous output. Colostomy next well vascularized and has green stool output. Midline surgical incision is well healing and staples are intact with no signs of erythema, warmth fluctuance or purulent discharge. Abdomen is otherwise soft nontender aside from CHANDNI drains no guarding/rigidity/rebound tenderness. Cap refill is less than 2 seconds. WBC [facility] at [facility] was 8.4, the patient is concerned that he might be currently having  an elevated white count, will order CBC and BMP. Work-up is negative, also consult the surgeon to see if he wants him to be admitted or given any outpatient medications as the patient had left AMA earlier today. RADIOLOGY:  none    EKG  none    All EKG's are interpreted by the Emergency Department Physician who either signs or Co-signs this chart in the absence of a cardiologist.    EMERGENCY DEPARTMENT COURSE:  While awaiting decision to call back, patient decided to leave AMA. Attending, Dr. Jyoti Mack, told the patient that he certainly has mycoplasma however will have any antibiotics or any pain meds as we do not know what the surgeon would prefer him to be on. Patient had to be out of the ER without difficulty. PROCEDURES:  none    CONSULTS:  IP CONSULT TO GENERAL SURGERY    CRITICAL CARE:  Please see attending note    FINAL IMPRESSION      1.  Abnormal laboratory test 2. No post-op complications          DISPOSITION / PLAN     DISPOSITION Anderson 03/17/2021 07:48:30 PM      PATIENT REFERRED TO:  No follow-up provider specified.     DISCHARGE MEDICATIONS:  Discharge Medication List as of 3/17/2021  7:49 PM          Pinky Gagnon DO  Emergency Medicine Resident    (Please note that portions of thisnote were completed with a voice recognition program.  Efforts were made to edit the dictations but occasionally words are mis-transcribed.)       Pinky Gagnon DO  Resident  03/18/21 [id number]

## 2021-06-20 PROBLEM — I46.8 TRAUMATIC CARDIAC ARREST (HCC): Status: ACTIVE | Noted: 2021-01-01

## 2021-06-20 PROBLEM — S31.139A GUNSHOT WOUND OF ABDOMEN: Status: ACTIVE | Noted: 2021-01-01
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
## 2021-06-20 NOTE — ANESTHESIA PRE PROCEDURE
Department of Anesthesiology  Preprocedure Note       Name:  Radha Chiang   Age:  80 y.o.  :  1880                                          MRN:  [id number]         Date:  2021      Surgeon: Erik Marinelli):  Kyra Ibrahim MD    Procedure: Procedure(s):  LAPAROTOMY EXPLORATORY    Medications prior to admission:   Prior to Admission medications    Not on File       Current medications:    No current facility-administered medications for this encounter. No current outpatient medications on file. Allergies:  No Known Allergies    Problem List:  There is no problem list on file for this patient. Past Medical History:  No past medical history on file. Past Surgical History:  No past surgical history on file. Social History:    Social History     Tobacco Use    Smoking status: Not on file   Substance Use Topics    Alcohol use: Not on file                                Counseling given: Not Answered      Vital Signs (Current): There were no vitals filed for this visit.                                            BP Readings from Last 3 Encounters:   No data found for BP       NPO Status:                                                                                 BMI:   Wt Readings from Last 3 Encounters:   No data found for Wt     There is no height or weight on file to calculate BMI.    CBC:   Lab Results   Component Value Date    WBC PENDING 2021    RBC PENDING 2021    HGB PENDING 2021    HCT PENDING 2021    MCV PENDING 2021    RDW PENDING 2021    PLT PENDING 2021       CMP:   Lab Results   Component Value Date    NA PENDING 2021    K PENDING 2021    CL PENDING 2021    CO2 PENDING 2021    BUN PENDING 2021    CREATININE PENDING 2021    GFRAA PENDING 2021    LABGLOM PENDING 2021    GLUCOSE PENDING 2021       POC Tests: No results for input(s): POCGLU, POCNA, POCK, POCCL, POCBUN, Jase Garcia in the last 72 hours. Coags:   Lab Results   Component Value Date    PROTIME PENDING 06/20/2021    INR PENDING 06/20/2021    APTT PENDING 06/20/2021       HCG (If Applicable): No results found for: PREGTESTUR, PREGSERUM, HCG, HCGQUANT     ABGs: No results found for: PHART, PO2ART, SEZ3IDJ, MKU0TLK, BEART, N6MEHDGY     Type & Screen (If Applicable):  No results found for: LABABO, LABRH    Drug/Infectious Status (If Applicable):  No results found for: HIV, HEPCAB    COVID-19 Screening (If Applicable): No results found for: COVID19        Anesthesia Evaluation  Patient summary reviewed no history of anesthetic complications:   Airway: Mallampati: Unable to assess / NA        Dental:          Pulmonary:Negative Pulmonary ROS and normal exam                               Cardiovascular:Negative CV ROS            Rhythm: regular  Rate: normal                    Neuro/Psych:   Negative Neuro/Psych ROS              GI/Hepatic/Renal: Neg GI/Hepatic/Renal ROS            Endo/Other: Negative Endo/Other ROS                    Abdominal:           Vascular: negative vascular ROS. Anesthesia Plan      general     ASA 5 - emergent       Induction: intravenous. Plan discussed with CRNA.                   Vipin Edwards MD   6/20/2021
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
## 2021-06-20 NOTE — PROGRESS NOTES
Date: 6/20/2021  Time: 0140  Patient identity confirmed:  Yes  Indications: unresponsive   Preoxygenation: yes    Laryngoscope size and type Glidescope  Airway introducer used: No  Evac: No  ETT size:a 7.5 cuffed  Number of attempts:1   Cords visualized:  [x] Clearly  [] Poorly  Breath sounds present bilaterally: Yes   ETCO2   [x] Positive   ETT secured at  26    ETT secured with titi  Chest x-ray ordered: Yes     Difficult airway:    No       If yes, was red tape placed around ETT:   No    Was this a Code Situation:    Yes                      Procedure performed by: Dr. Ascencion Camara RCP  2:29 AM
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
## 2021-06-21 LAB
ABO/RH: NORMAL
ANTIBODY SCREEN: NEGATIVE
ARM BAND NUMBER: NORMAL
BLD PROD TYP BPU: NORMAL
CROSSMATCH RESULT: NORMAL
DISPENSE STATUS BLOOD BANK: NORMAL
EXPIRATION DATE: NORMAL
TRANSFUSION STATUS: NORMAL
UNIT DIVISION: 0
UNIT NUMBER: NORMAL
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
## (undated) DEVICE — ELECTRODE PT RET AD L9FT HI MOIST COND ADH HYDRGEL CORDED

## (undated) DEVICE — SUTURE VCRL SZ 2-0 L27IN ABSRB UD L26MM SH 1/2 CIR J417H

## (undated) DEVICE — 1000 S-DRAPE TOWEL DRAPE 10/BX: Brand: STERI-DRAPE™

## (undated) DEVICE — Z DUPLICATE USE 2716240 STAPLER INT CUT LN L40MM STPL L51MM GRN CRV HD B FRM

## (undated) DEVICE — RELOAD STPL L75MM OPN H3.8MM CLS 1.5MM WIRE DIA0.2MM REG

## (undated) DEVICE — DRESSING GRMCDL 6 12FR D1N CNTR HOLE 4MM ANTMCRBL PRTCTVE DI

## (undated) DEVICE — COVER LT HNDL BLU PLAS

## (undated) DEVICE — SEALER ENDOSCP NANO COAT OPN DIV CRV L JAW LIGASURE IMPACT

## (undated) DEVICE — GLOVE ORANGE PI 7   MSG9070

## (undated) DEVICE — GOWN,POLY REINFORCED,LG: Brand: MEDLINE

## (undated) DEVICE — TOWEL,OR,DSP,ST,NATURAL,DLX,4/PK,20PK/CS: Brand: MEDLINE

## (undated) DEVICE — CANISTER NEG PRSS 1000ML W/ GEL INFOVAC

## (undated) DEVICE — DRESSING NEG PRESSURE WND VAC

## (undated) DEVICE — BANDAGE,GAUZE,BULKEE II,4.5"X4.1YD,STRL: Brand: MEDLINE

## (undated) DEVICE — PAD,ABDOMINAL,5"X9",ST,LF,25/BX: Brand: MEDLINE INDUSTRIES, INC.

## (undated) DEVICE — SPONGE LAP W18XL18IN WHT COT 4 PLY FLD STRUNG RADPQ DISP ST

## (undated) DEVICE — RESERVOIR,SUCTION,100CC,SILICONE: Brand: MEDLINE

## (undated) DEVICE — DEFENDO AIR WATER SUCTION AND BIOPSY VALVE KIT FOR  OLYMPUS: Brand: DEFENDO AIR/WATER/SUCTION AND BIOPSY VALVE

## (undated) DEVICE — SUTURE PERMAHAND SZ 3-0 L18IN NONABSORBABLE BLK L26MM SH C013D

## (undated) DEVICE — GOWN,SIRUS,NONRNF,SETINSLV,XL,20/CS: Brand: MEDLINE

## (undated) DEVICE — GOWN,SIRUS,POLYRNF,BRTHSLV,LG,30/CS: Brand: MEDLINE

## (undated) DEVICE — RELOAD STPL L100MM OPN H3.8MM CLS H1.5MM WIRE DIA0.2MM BLU

## (undated) DEVICE — KIT DSG ABTHERA SENSATRAC

## (undated) DEVICE — PLUG,CATHETER,DRAINAGE PROTECTOR,TUBE: Brand: MEDLINE

## (undated) DEVICE — DRESSING NEG PRSS L W15XL10CM D1CM POLYVI ALC WHT FOAM VAC

## (undated) DEVICE — DRAIN SURG 19FR 100% SIL RADPQ RND CHN FULL FLUT

## (undated) DEVICE — FORCEPS BX L240CM WRK CHN 2.8MM STD CAP W/ NDL MIC MESH

## (undated) DEVICE — JELLY,LUBE,STERILE,FLIP TOP,TUBE,2-OZ: Brand: MEDLINE

## (undated) DEVICE — SUTURE VCRL SZ 3-0 L27IN ABSRB UD L26MM SH 1/2 CIR J416H

## (undated) DEVICE — DRESSING NEG PRSS M 18X12.5X3.3CM POLYUR FOR WND THER VAC

## (undated) DEVICE — SNARE ENDOSCP L240CM LOOP W13MM DIA2.4MM SHT THROW SM OVL

## (undated) DEVICE — Device: Brand: SENSURA MIO

## (undated) DEVICE — GLOVE SURG SZ 7 L12IN FNGR THK79MIL GRN LTX FREE

## (undated) DEVICE — SUTURE ETHBND EXCEL SZ 1 L30IN NONABSORBABLE GRN L36MM CT-1 X425H

## (undated) DEVICE — AGENT HEMSTAT 3GM OXIDIZED REGENERATED CELOS ABSRB FOR CONT (ORDER MULTIPLES OF 5EA)

## (undated) DEVICE — STAPLER INT L75MM CUT LN L73MM STPL LN L77MM BLU B FRM 8

## (undated) DEVICE — GLOVE SURG SZ 75 L12IN FNGR THK79MIL GRN LTX FREE

## (undated) DEVICE — GLOVE SURG SZ 65 THK91MIL LTX FREE SYN POLYISOPRENE

## (undated) DEVICE — GLOVE ORANGE PI 7 1/2   MSG9075

## (undated) DEVICE — LOOP VES W25MM THK1MM MAXI RED SIL FLD REPELLENT 100 PER

## (undated) DEVICE — TOTAL TRAY, 16FR 10ML SIL FOLEY, URN: Brand: MEDLINE

## (undated) DEVICE — POLYP TRAP: Brand: TRAPEASE®

## (undated) DEVICE — TUBING, SUCTION, 9/32" X 20', STRAIGHT: Brand: MEDLINE INDUSTRIES, INC.

## (undated) DEVICE — GOWN,SIRUS,POLYRNF,BRTHSLV,XL,30/CS: Brand: MEDLINE

## (undated) DEVICE — STAPLER INT L100MM CUT LN L98MM STPL LN L102MM BLU B FRM 8

## (undated) DEVICE — PACK SURG ABD SVMMC

## (undated) DEVICE — Z DISCONTINUED BY MEDLINE USE 2711682 TRAY SKIN PREP DRY W/ PREM GLV

## (undated) DEVICE — APPLIER CLP L L13IN TI MULT RNG HNDL 20 CLP STR LIGACLP

## (undated) DEVICE — SEALANT TISS 10 CC FIBRIN VISTASEAL

## (undated) DEVICE — GARMENT,MEDLINE,DVT,INT,CALF,MED, GEN2: Brand: MEDLINE

## (undated) DEVICE — ACHIEVE NEEDLE BIOP SOFT TISSUE 18GX6CM

## (undated) DEVICE — GLOVE ORANGE PI 8   MSG9080

## (undated) DEVICE — APPLICATOR MEDICATED 26 CC SOLUTION HI LT ORNG CHLORAPREP

## (undated) DEVICE — GAUZE,SPONGE,FLUFF,6"X6.75",STRL,5/TRAY: Brand: MEDLINE

## (undated) DEVICE — COVER OR TBL W40XL90IN ABSRB STD AND GRIPPY BK SAHARA

## (undated) DEVICE — STAPLER INT L60MM REG TISS BLU B FRM 8 FIRING 2 ROW AUTO

## (undated) DEVICE — STAPLER INT STPL LN H1.8-4.8XL60MM THCK TISS 2 ROW 8 FIRING

## (undated) DEVICE — SUTURE PROL SZ 3-0 L30IN NONABSORBABLE BLU L26MM SH 1/2 CIR 8832H

## (undated) DEVICE — DRESSING TRNSPAR W5XL4.5IN FLM SHT SEMIPERMEABLE WIND

## (undated) DEVICE — SUTURE PERMAHAND SZ 2-0 L18IN NONABSORBABLE BLK L26MM SH C012D

## (undated) DEVICE — SOLUTION PREP POVIDONE IOD FOR SKIN MUCOUS MEM PRIOR TO

## (undated) DEVICE — SUTURE ETHLN SZ 3-0 L18IN NONABSORBABLE BLK L24MM PS-1 3/8 1663G

## (undated) DEVICE — SOLUTION SCRB 4OZ 10% POVIDONE IOD ANTIMIC BTL

## (undated) DEVICE — GOWN,AURORA,NONREINFORCED,LARGE: Brand: MEDLINE

## (undated) DEVICE — BLADE CLIPPER GEN PURP NS

## (undated) DEVICE — SUTURE PDS II SZ 0 L60IN ABSRB VLT L65MM TP-1 1/2 CIR Z991G

## (undated) DEVICE — CAP INJ L0.75IN INTMIT

## (undated) DEVICE — SUTURE PERMAHAND SZ 3-0 L30IN NONABSORBABLE BLK SH L26MM K832H

## (undated) DEVICE — YANKAUER,POOLE TIP,STERILE,50/CS: Brand: MEDLINE